# Patient Record
Sex: FEMALE | Race: OTHER | Employment: UNEMPLOYED | URBAN - METROPOLITAN AREA
[De-identification: names, ages, dates, MRNs, and addresses within clinical notes are randomized per-mention and may not be internally consistent; named-entity substitution may affect disease eponyms.]

---

## 2017-01-06 ENCOUNTER — TELEPHONE (OUTPATIENT)
Dept: ENDOCRINOLOGY | Age: 37
End: 2017-01-06

## 2017-01-06 NOTE — TELEPHONE ENCOUNTER
----- Message from Rukhsana Marrero MD sent at 1/6/2017 12:11 PM EST -----  Regarding: RE: sample  You can give them a pen or two.  ----- Message -----     From: Svitlana Mohr     Sent: 1/6/2017  11:23 AM       To: Rukhsana Marrero MD  Subject: sample                                            is calling to say they are having a hard time getting the Victoza. They are wondering if we can give them some samples till they get theirs.     ------------  Per Dr Alondra Mendez, I called Jacob Haas back and told her I had some samples here for her to . I spoke with her  Lou Chairez. He said he would come in on Monday 1-9-17 to pick them up.

## 2017-02-09 ENCOUNTER — HOSPITAL ENCOUNTER (EMERGENCY)
Age: 37
Discharge: HOME | End: 2017-02-09
Attending: EMERGENCY MEDICINE
Payer: MEDICAID

## 2017-02-09 ENCOUNTER — APPOINTMENT (OUTPATIENT)
Dept: GENERAL RADIOLOGY | Age: 37
End: 2017-02-09
Attending: EMERGENCY MEDICINE
Payer: MEDICAID

## 2017-02-09 VITALS
HEIGHT: 61 IN | TEMPERATURE: 97.9 F | OXYGEN SATURATION: 99 % | RESPIRATION RATE: 16 BRPM | DIASTOLIC BLOOD PRESSURE: 67 MMHG | SYSTOLIC BLOOD PRESSURE: 101 MMHG

## 2017-02-09 DIAGNOSIS — M79.672 LEFT FOOT PAIN: Primary | ICD-10-CM

## 2017-02-09 LAB
GLUCOSE BLD STRIP.AUTO-MCNC: 108 MG/DL (ref 65–100)
SERVICE CMNT-IMP: ABNORMAL

## 2017-02-09 PROCEDURE — 74011250637 HC RX REV CODE- 250/637: Performed by: EMERGENCY MEDICINE

## 2017-02-09 PROCEDURE — 73590 X-RAY EXAM OF LOWER LEG: CPT

## 2017-02-09 PROCEDURE — 73630 X-RAY EXAM OF FOOT: CPT

## 2017-02-09 PROCEDURE — 82962 GLUCOSE BLOOD TEST: CPT

## 2017-02-09 PROCEDURE — 99284 EMERGENCY DEPT VISIT MOD MDM: CPT

## 2017-02-09 RX ORDER — ACETAMINOPHEN 325 MG/1
975 TABLET ORAL
Status: COMPLETED | OUTPATIENT
Start: 2017-02-09 | End: 2017-02-09

## 2017-02-09 RX ADMIN — ACETAMINOPHEN 975 MG: 325 TABLET, FILM COATED ORAL at 08:36

## 2017-02-09 NOTE — ED PROVIDER NOTES
HPI Comments: This patient with a history of diabetes and high cholesterol presents with 10 days of left foot pain. It is worse with walking and she can hardly walk with this pain. She denies any fever or chills or recent illnesses. Pain has also progressed to the distal left leg just above the ankle as well. No numbness of the toes. No abdominal or chest pain. No calf pain or tenderness. No leg swelling. No history of DVT. No skin changes. There are no other issues today. Patient is a 31 y.o. female presenting with foot pain. Foot Pain           Past Medical History:   Diagnosis Date    Diabetes mellitus ([facility] [address]      insulin    Hyperlipidemia     Obesity        Past Surgical History:   Procedure Laterality Date    Hx  section  2011     breech    Pr  delivery only                Family History:   Problem Relation Age of Onset    Diabetes Mother     Diabetes Father     Diabetes Sister     Diabetes Brother        Social History     Social History    Marital status:      Spouse name: N/A    Number of children: N/A    Years of education: N/A     Occupational History    Not on file. Social History Main Topics    Smoking status: Never Smoker    Smokeless tobacco: Never Used    Alcohol use No    Drug use: No    Sexual activity: Yes     Partners: Male     Birth control/ protection: None     Other Topics Concern    Not on file     Social History Narrative         ALLERGIES: Review of patient's allergies indicates no known allergies. Review of Systems   All other systems reviewed and are negative. Vitals:    17 0803   BP: 101/67   Resp: 16   Temp: 97.9 °F (36.6 °C)   SpO2: 99%   Height: 5' 1\" (1.549 m)            Physical Exam   Constitutional: She appears well-developed and well-nourished. No distress. HENT:   Head: Normocephalic. Nose: Nose normal.   Mouth/Throat: Oropharynx is clear and moist. No oropharyngeal exudate.    Eyes: Conjunctivae are normal. Pupils are equal, round, and reactive to light. Neck: No tracheal deviation present. Cardiovascular: Normal rate, regular rhythm, normal heart sounds and intact distal pulses. Pulmonary/Chest: Effort normal and breath sounds normal.   Abdominal: Soft. She exhibits no distension. There is no tenderness. There is no rebound. Musculoskeletal:   Tender to distal leg, left ankle, dorsum of left foot. No warmth, skin change, edema noted. Distal nv exam grossly intact. Neurological: She is alert. Skin: Skin is warm and dry. She is not diaphoretic. Psychiatric: She has a normal mood and affect. [facility]  ED Course       Procedures           Reviewed xrays    Labs Reviewed   GLUCOSE, POC - Abnormal; Notable for the following:        Result Value    Glucose (POC) 108 (*)     All other components within normal limits   POC GLUCOSE     Has no PCP  Will refer  Doubt infection  Sent out on crutches.

## 2017-02-09 NOTE — DISCHARGE INSTRUCTIONS
We hope that we have addressed all of your medical concerns. The examination and treatment you received in the Emergency Department were for an emergent problem and were not intended as complete care. It is important that you follow up with your healthcare provider(s) for ongoing care. If your symptoms worsen or do not improve as expected, and you are unable to reach your usual health care provider(s), you should return to the Emergency Department. Today's healthcare is undergoing tremendous change, and patient satisfaction surveys are one of the many tools to assess the quality of medical care. You may receive a survey from the [employer] regarding your experience in the Emergency Department. I hope that your experience has been completely positive, particularly the medical care that I provided. As such, please participate in the survey; anything less than excellent does not meet my expectations or intentions. [facility] and [employer] participate in nationally recognized quality of care measures. If your blood pressure is greater than 120/80, as reported below, we urge that you seek medical care to address the potential of high blood pressure, commonly known as hypertension. Hypertension can be hereditary or can be caused by certain medical conditions, pain, stress, or \"white coat syndrome. \"       Please make an appointment with your health care provider(s) for follow up of your Emergency Department visit. VITALS:   Patient Vitals for the past 8 hrs:   Temp Resp BP SpO2   02/09/17 0803 97.9 °F (36.6 °C) 16 101/67 99 %          Thank you for allowing us to provide you with medical care today. We realize that you have many choices for your emergency care needs. Please choose us in the future for any continued health care needs.       Regards,           Jorge Lester, DO    [employer]   Office: [phone]            Recent Results (from the past 24 hour(s))   GLUCOSE, POC    Collection Time: 02/09/17  8:38 AM   Result Value Ref Range    Glucose (POC) 108 (H) 65 - 100 mg/dL    Performed by Keyanna Trejo        Xr Tib/fib Lt    Result Date: 2/9/2017  EXAM:  XR TIB/FIB LT INDICATION:  pain distal leg for 10 days. COMPARISON: None. FINDINGS: AP and lateral  views of the left tibia and fibula demonstrate no fracture or other acute osseous, articular or soft tissue abnormality. IMPRESSION: No acute abnormality. Xr Foot Lt Min 3 V    Result Date: 2/9/2017  EXAM:  XR FOOT LT MIN 3 V INDICATION:   pain dorsum left foot x 10 days. . COMPARISON:  None. FINDINGS:  Three views of the left foot demonstrate no fracture or other acute osseous or articular abnormality. The soft tissues are within normal limits. IMPRESSION:  No acute abnormality. Foot Pain: Care Instructions  Your Care Instructions  Foot injuries that cause pain and swelling are fairly common. Almost all sports or home repair projects can cause a misstep that ends up as foot pain. Normal wear and tear, especially as you get older, also can cause foot pain. Most minor foot injuries will heal on their own, and home treatment is usually all you need to do. If you have a severe injury, you may need tests and treatment. Follow-up care is a key part of your treatment and safety. Be sure to make and go to all appointments, and call your doctor if you are having problems. Its also a good idea to know your test results and keep a list of the medicines you take. How can you care for yourself at home? · Take pain medicines exactly as directed. ¨ If the doctor gave you a prescription medicine for pain, take it as prescribed. ¨ If you are not taking a prescription pain medicine, ask your doctor if you can take an over-the-counter medicine. · Rest and protect your foot. Take a break from any activity that may cause pain.   · Put ice or a cold pack on your foot for 10 to 20 minutes at a time. Put a thin cloth between the ice and your skin. · Prop up the sore foot on a pillow when you ice it or anytime you sit or lie down during the next 3 days. Try to keep it above the level of your heart. This will help reduce swelling. · Your doctor may recommend that you wrap your foot with an elastic bandage. Keep your foot wrapped for as long as your doctor advises. · If your doctor recommends crutches, use them as directed. · Wear roomy footwear. · As soon as pain and swelling end, begin gentle exercises of your foot. Your doctor can tell you which exercises will help. When should you call for help? Call 911 anytime you think you may need emergency care. For example, call if:  · Your foot turns pale, white, blue, or cold. Call your doctor now or seek immediate medical care if:  · You cannot move or stand on your foot. · Your foot looks twisted or out of its normal position. · Your foot is not stable when you step down. · You have signs of infection, such as:  ¨ Increased pain, swelling, warmth, or redness. ¨ Red streaks leading from the sore area. ¨ Pus draining from a place on your foot. ¨ A fever. · Your foot is numb or tingly. Watch closely for changes in your health, and be sure to contact your doctor if:  · You do not get better as expected. · You have bruises from an injury that last longer than 2 weeks. Where can you learn more? Go to http://tayler-eduardo.info/. Enter I034 in the search box to learn more about \"Foot Pain: Care Instructions. \"  Current as of: May 23, 2016  Content Version: 11.1  © 0640-9975 [employer]. Care instructions adapted under license by [employer] (which disclaims liability or warranty for this information).  If you have questions about a medical condition or this instruction, always ask your healthcare professional. Nancy Ansari any warranty or liability for your use of this information. Learning About RICE (Rest, Ice, Compression, and Elevation)  What is RICE? RICE is a way to care for an injury. RICE helps relieve pain and swelling. It may also help with healing and flexibility. RICE stands for:  · Rest and protect the injured or sore area. · Ice or a cold pack used as soon as possible. · Compression, or wrapping the injured or sore area with an elastic bandage. · Elevation (propping up) the injured or sore area. How do you do RICE? You can use RICE for home treatment when you have general aches and pains or after an injury or surgery. Rest  · Do not put weight on the injury for at least 24 to 48 hours. · Use crutches for a badly sprained knee or ankle. · Support a sprained wrist, elbow, or shoulder with a sling. Ice  · Put ice or a cold pack on the injury right away to reduce pain and swelling. Frozen vegetables will also work as an ice pack. Put a thin cloth between the ice or cold pack and your skin. The cloth protects the injured area from getting too cold. · Use ice for 10 to 15 minutes at a time for the first 48 to 72 hours. Compression  · Use compression for sprains, strains, and surgeries of the arms and legs. · Wrap the injured area with an elastic bandage or compression sleeve to reduce swelling. · Don't wrap it too tightly. If the area below it feels numb, tingles, or feels cool, loosen the wrap. Elevation  · Use elevation for areas of the body that can be propped up, such as arms and legs. · Prop up the injured area on pillows whenever you use ice. Keep it propped up anytime you sit or lie down. · Try to keep the injured area at or above the level of your heart. This will help reduce swelling and bruising. Where can you learn more? Go to http://tayler-eduardo.info/. Enter X210 in the search box to learn more about \"Learning About RICE (Rest, Ice, Compression, and Elevation). \"  Current as of: May 23, 2016  Content Version: 11.1  © 9525-1273 [employer]. Care instructions adapted under license by [employer] (which disclaims liability or warranty for this information). If you have questions about a medical condition or this instruction, always ask your healthcare professional. [address] any warranty or liability for your use of this information.

## 2017-02-13 ENCOUNTER — HOSPITAL ENCOUNTER (OUTPATIENT)
Dept: LAB | Age: 37
Discharge: HOME | End: 2017-02-13

## 2017-02-13 PROCEDURE — 87624 HPV HI-RISK TYP POOLED RSLT: CPT | Performed by: OBSTETRICS & GYNECOLOGY

## 2017-02-13 PROCEDURE — 88175 CYTOPATH C/V AUTO FLUID REDO: CPT | Performed by: OBSTETRICS & GYNECOLOGY

## 2017-02-14 ENCOUNTER — HOSPITAL ENCOUNTER (OUTPATIENT)
Dept: LAB | Age: 37
Discharge: HOME | End: 2017-02-14

## 2017-02-14 LAB — HCG SERPL-ACNC: 281 MIU/ML (ref 0–6)

## 2017-02-14 PROCEDURE — 84702 CHORIONIC GONADOTROPIN TEST: CPT | Performed by: NURSE PRACTITIONER

## 2017-02-16 ENCOUNTER — TELEPHONE (OUTPATIENT)
Dept: ENDOCRINOLOGY | Age: 37
End: 2017-02-16

## 2017-02-16 NOTE — TELEPHONE ENCOUNTER
Angelica stopped her meds because she is pregnant and you had told her to stop when she became pregnant. Is the 28th appointment still ok. Brenda Sharp     ---------------------    2/16/2017 413pm    I called Angelica back and spoke with her  and relayed the message from Dr. Darien Higuera that she should continue with her levemir and we should see her tomorrow at 9:50am. I gave them to the  to make that appointment.   Brenda Sharp

## 2017-02-16 NOTE — TELEPHONE ENCOUNTER
Patient stopped taking all of her medication on 02/12/16, and has an appt for 02/28/17 wants to know if the provider wants to see the patient prior to the already scheduled appt. ?

## 2017-02-17 ENCOUNTER — OFFICE VISIT (OUTPATIENT)
Dept: ENDOCRINOLOGY | Age: 37
End: 2017-02-17

## 2017-02-17 VITALS
WEIGHT: 159.2 LBS | BODY MASS INDEX: 30.06 KG/M2 | HEIGHT: 61 IN | SYSTOLIC BLOOD PRESSURE: 117 MMHG | DIASTOLIC BLOOD PRESSURE: 68 MMHG | HEART RATE: 98 BPM

## 2017-02-17 DIAGNOSIS — O24.911 DIABETES MELLITUS COMPLICATING PREGNANCY, ANTEPARTUM, FIRST TRIMESTER: Primary | ICD-10-CM

## 2017-02-17 DIAGNOSIS — E78.2 MIXED HYPERLIPIDEMIA: ICD-10-CM

## 2017-02-17 DIAGNOSIS — Z79.4 UNCONTROLLED TYPE 2 DIABETES MELLITUS WITH HYPERGLYCEMIA, WITH LONG-TERM CURRENT USE OF INSULIN (HCC): ICD-10-CM

## 2017-02-17 DIAGNOSIS — E11.65 UNCONTROLLED TYPE 2 DIABETES MELLITUS WITH HYPERGLYCEMIA, WITH LONG-TERM CURRENT USE OF INSULIN (HCC): ICD-10-CM

## 2017-02-17 DIAGNOSIS — E78.5 HYPERLIPIDEMIA, UNSPECIFIED HYPERLIPIDEMIA TYPE: ICD-10-CM

## 2017-02-17 DIAGNOSIS — I10 ESSENTIAL HYPERTENSION WITH GOAL BLOOD PRESSURE LESS THAN 130/80: ICD-10-CM

## 2017-02-17 RX ORDER — INSULIN LISPRO 100 [IU]/ML
INJECTION, SOLUTION INTRAVENOUS; SUBCUTANEOUS
Qty: 7 PACKAGE | Refills: 3 | Status: SHIPPED | OUTPATIENT
Start: 2017-02-17 | End: 2017-02-28 | Stop reason: SDUPTHER

## 2017-02-17 NOTE — PATIENT INSTRUCTIONS
Levemir 50 units at bedtime  Humalog 20 units each meal,        Increase humalog by 2 units every 2 days to reach goal blood sugar        Goal blood sugars:  Before meals <120  Fasting AM    <90    Stop all other diabetes, cholesterol, and blood pressure medications  Take only the above,    ----------------------------------------------------------------------------------------------------------------------    Below you will find a glucose log sheet which you can use to record your blood sugars. Without checking and recording what your home glucose levels are, it will be difficult to make any changes to your medication dose, even when significant changes may be needed. Please feel free to use the log below to record your home glucose levels. At the very least, I would like for you to login the entire 2-3 weeks just before your visit so we can make your visit much more productive and beneficial to you. GLUCOSE LOG SHEET:    Date Breakfast Lunch Dinner Bedtime Comments ? GLUCOSE LOG SHEET:    Date Breakfast Lunch Dinner Bedtime Comments ? GLUCOSE LOG SHEET:    Date Breakfast Lunch Dinner Bedtime Comments ?

## 2017-02-17 NOTE — MR AVS SNAPSHOT
Visit Information Date & Time Provider Department Dept. Phone Encounter #  
 2/17/2017  9:50 AM Bobby Zee, [address] [place] [address] Diabetes and Endocrinology [id number]  Follow-up Instructions Return in about 2 weeks (around 3/3/2017). Your Appointments 2/28/2017 12:10 PM  
Follow Up with MD Dougie Pizarro Diabetes and Endocrinology [facility] Appt Note: 2 month f/u    Diabetes [address] [id number] [address] [place] [address] Upcoming Health Maintenance Date Due  
 EYE EXAM RETINAL OR DILATED Q1 12/6/1992 Pneumococcal 19-64 Medium Risk (1 of 1 - PPSV23) 12/6/2001 DTaP/Tdap/Td series (1 - Tdap) 12/6/2003 INFLUENZA AGE 9 TO ADULT 8/1/2016 PAP AKA CERVICAL CYTOLOGY 3/11/2017 HEMOGLOBIN A1C Q6M 5/16/2017 FOOT EXAM Q1 9/13/2017 MICROALBUMIN Q1 11/16/2017 LIPID PANEL Q1 11/16/2017 Allergies as of 2/17/2017  Review Complete On: 2/17/2017 By: Bobby Zee MD  
 No Known Allergies Current Immunizations  Reviewed on 9/5/2014 Name Date Influenza High Dose Vaccine PF 10/1/2013 Not reviewed this visit You Were Diagnosed With   
  
 Codes Comments Diabetes mellitus complicating pregnancy, antepartum, first trimester    -  Primary ICD-10-CM: O24.911 ICD-9-CM: 648.03, 250.00 Essential hypertension with goal blood pressure less than 130/80     ICD-10-CM: I10 
ICD-9-CM: 401.9 Hyperlipidemia, unspecified hyperlipidemia type     ICD-10-CM: E78.5 ICD-9-CM: 272.4 Uncontrolled type 2 diabetes mellitus with hyperglycemia, with long-term current use of insulin (HCC)     ICD-10-CM: E11.65, Z79.4 ICD-9-CM: 250.02, V58.67 Mixed hyperlipidemia     ICD-10-CM: E78.2 ICD-9-CM: 272.2 Vitals BP Pulse Height(growth percentile) Weight(growth percentile) LMP BMI 117/68 (BP 1 Location: Left arm, BP Patient Position: Sitting) 98 5' 1\" (1.549 m) 159 lb 3.2 oz (72.2 kg) 01/10/2017 30.08 kg/m2 OB Status Smoking Status Having regular periods Never Smoker Vitals History BMI and BSA Data Body Mass Index Body Surface Area 30.08 kg/m 2 1.76 m 2 Preferred Pharmacy Pharmacy Name Phone [facility] [address] [place] [address] Your Updated Medication List  
  
   
This list is accurate as of: 2/17/17 10:07 AM.  Always use your most recent med list.  
  
  
  
  
 canagliflozin 300 mg tablet Commonly known as:  Luevenia Nails TAKE ONE TABLET BY MOUTH ONCE DAILY IN THE MORNING  
  
 cyclobenzaprine 5 mg tablet Commonly known as:  FLEXERIL Take 1 Tab by mouth two (2) times a day. diclofenac EC 75 mg EC tablet Commonly known as:  VOLTAREN Take 1 Tab by mouth two (2) times a day. glipiZIDE 5 mg tablet Commonly known as:  GLUCOTROL  
5-10mg with each meal  
  
 glucose blood VI test strips strip Commonly known as:  Wes Romp Use to test blood glucose 5x daily * HYDROcodone-acetaminophen 5-325 mg per tablet Commonly known as:  Mike Songster Take 1 Tab by mouth every four (4) hours as needed for Pain. Max Daily Amount: 6 Tabs. * HYDROcodone-acetaminophen 7.5-325 mg per tablet Commonly known as:  NORCO  
TAKE 1 TABLET BY MOUTH EVERY 4 TO 6 HOURS AS NEEDED FOR PAIN  
  
 ibuprofen 600 mg tablet Commonly known as:  MOTRIN Take 1 Tab by mouth every eight (8) hours as needed for Pain. insulin detemir 100 unit/mL (3 mL) Inpn Commonly known as:  LEVEMIR FLEXTOUCH  
50 units at bedtime  
  
 insulin lispro 100 unit/mL kwikpen Commonly known as:  HUMALOG Up to 40 units with each meal  
  
 Liraglutide 0.6 mg/0.1 mL (18 mg/3 mL) sub-q pen Commonly known as:  VICTOZA 3-DEBRA  
0.3 mL by SubCUTAneous route daily. Titrate up to 1.8mg daily as direcected lisinopril 5 mg tablet Commonly known as:  Una Chester Take 1 Tab by mouth daily. metFORMIN 1,000 mg tablet Commonly known as:  GLUCOPHAGE Take 1 Tab by mouth two (2) times a day. oxyCODONE-acetaminophen 5-325 mg per tablet Commonly known as:  PERCOCET Take 1-2 tablets by mouth every four (4) hours as needed for Pain. penicillin v potassium 500 mg tablet Commonly known as:  VEETID  
TAKE 1 TABLET BY MOUTH 4 TIMES A DAY  
  
 PNV with Ca,No.71-Iron-FA 27-1 mg Tab Take  by mouth. simvastatin 20 mg tablet Commonly known as:  ZOCOR Take 1 Tab by mouth nightly. * Notice: This list has 2 medication(s) that are the same as other medications prescribed for you. Read the directions carefully, and ask your doctor or other care provider to review them with you. Prescriptions Printed Refills  
 insulin lispro (HUMALOG) 100 unit/mL kwikpen 3 Sig: Up to 40 units with each meal  
 Class: Print  
 insulin detemir (LEVEMIR FLEXTOUCH) 100 unit/mL (3 mL) inpn 5 Si units at bedtime Class: Print Follow-up Instructions Return in about 2 weeks (around 3/3/2017). Patient Instructions Levemir 50 units at bedtime Humalog 20 units each meal, Increase humalog by 2 units every 2 days to reach goal blood sugar Goal blood sugars: 
Before meals <120 Fasting AM    <90 Stop all other diabetes, cholesterol, and blood pressure medications Take only the above, 
 
---------------------------------------------------------------------------------------------------------------------- Below you will find a glucose log sheet which you can use to record your blood sugars. Without checking and recording what your home glucose levels are, it will be difficult to make any changes to your medication dose, even when significant changes may be needed.  Please feel free to use the log below to record your home glucose levels. At the very least, I would like for you to login the entire 2-3 weeks just before your visit so we can make your visit much more productive and beneficial to you. GLUCOSE LOG SHEET: 
 
Date Breakfast Lunch Dinner Bedtime Comments ? GLUCOSE LOG SHEET: 
 
Date Breakfast Lunch Dinner Bedtime Comments ? GLUCOSE LOG SHEET: 
 
Date Breakfast Lunch Dinner Bedtime Comments ? Introducing hospitals & HEALTH SERVICES! Tristin Eastman introduces I[employer] patient portal. Now you can access parts of your medical record, email your doctor's office, and request medication refills online. 1. In your internet browser, go to https://[employer]. [employer] 2. Click on the First Time User? Click Here link in the Sign In box. You will see the New Member Sign Up page. 3. Enter your [employer] Access Code exactly as it appears below. You will not need to use this code after youve completed the sign-up process. If you do not sign up before the expiration date, you must request a new code. · [employer] Access Code: [email or web address] Expires: 3/20/2017 12:36 PM 
 
4.  Enter the last four digits of your Social Security Number (xxxx) and Date of Birth (mm/dd/yyyy) as indicated and click Submit. You will be taken to the next sign-up page. 5. Create a [employer] ID. This will be your [employer] login ID and cannot be changed, so think of one that is secure and easy to remember. 6. Create a [employer] password. You can change your password at any time. 7. Enter your Password Reset Question and Answer. This can be used at a later time if you forget your password. 8. Enter your e-mail address. You will receive e-mail notification when new information is available in [address] 9. Click Sign Up. You can now view and download portions of your medical record. 10. Click the Download Summary menu link to download a portable copy of your medical information. If you have questions, please visit the Frequently Asked Questions section of the [employer] website. Remember, [employer] is NOT to be used for urgent needs. For medical emergencies, dial 911. Now available from your iPhone and Android! Please provide this summary of care documentation to your next provider. Your primary care clinician is listed as NONE. If you have any questions after today's visit, please call [phone].

## 2017-02-17 NOTE — PROGRESS NOTES
CHIEF COMPLAINT: f/u uncontrolled type 2 diabetes    HISTORY OF PRESENT ILLNESS:   Daniel Sims is a 31 y.o. female with a PMHx as noted below who presents for f/u of uncontrolled type 2 diabetes. Patient notes that she is now 3-4 weeks pregnant. She had called a few days ago to let us know, and she had stopped all her medications except her levemir as I had instructed her to do in case she ever became pregnant. She increased her levemir to 50 units BID. She is seeing an OB and a high risk OB as well. She is requesting a letter to advise that she needs her mother in law to travel to the [address] [place] [address] [address] to help with her children during this time. She has previously been taking victoza, invokana, metformin, and levemir. Current medications:  Invokana 300mg daily (HELD FOR PREGNANCY)  Metformin 1000mg BID (HELD FOR PREGNANCY)  Levemir 50 U once BID  Victoza (HELD FOR PREGNANCY)  Simvastatin 20mg daily (HELD FOR PREGNANCY)  Lisinopril 5mg daily (HELD FOR PREGNANCY)    Review of home glucose:  No log for review today    Vitamin D deficiency. 22 OH D level of 13.9, prev advised 2000 units of D3 daily        PAST MEDICAL/SURGICAL HISTORY:   Past Medical History   Diagnosis Date    Diabetes mellitus ([address]      insulin    Hyperlipidemia     Obesity      Past Surgical History   Procedure Laterality Date    Hx  section  2011     breech    Pr  delivery only              ALLERGIES:   No Known Allergies    MEDICATIONS ON ADMISSION:     Current Outpatient Prescriptions:     insulin lispro (HUMALOG) 100 unit/mL kwikpen, Up to 40 units with each meal, Disp: 7 Package, Rfl: 3    insulin detemir (LEVEMIR FLEXTOUCH) 100 unit/mL (3 mL) inpn, 50 units at bedtime, Disp: 15 Pen, Rfl: 5    glucose blood VI test strips (ONE TOUCH VERIO) strip, Use to test blood glucose 5x daily, Disp: 100 Strip, Rfl: 11    Liraglutide (VICTOZA 3-DEBRA) 0.6 mg/0.1 mL (18 mg/3 mL) sub-q pen, 0.3 mL by SubCUTAneous route daily.  Titrate up to 1.8mg daily as direcected, Disp: 3 Each, Rfl: 0    simvastatin (ZOCOR) 20 mg tablet, Take 1 Tab by mouth nightly., Disp: 90 Tab, Rfl: 3    metFORMIN (GLUCOPHAGE) 1,000 mg tablet, Take 1 Tab by mouth two (2) times a day., Disp: 180 Tab, Rfl: 3    lisinopril (PRINIVIL, ZESTRIL) 5 mg tablet, Take 1 Tab by mouth daily. , Disp: 90 Tab, Rfl: 3    glipiZIDE (GLUCOTROL) 5 mg tablet, 5-10mg with each meal, Disp: 270 Tab, Rfl: 3    canagliflozin (INVOKANA) 300 mg tablet, TAKE ONE TABLET BY MOUTH ONCE DAILY IN THE MORNING, Disp: 90 Tab, Rfl: 3    HYDROcodone-acetaminophen (NORCO) 7.5-325 mg per tablet, TAKE 1 TABLET BY MOUTH EVERY 4 TO 6 HOURS AS NEEDED FOR PAIN, Disp: , Rfl: 0    penicillin v potassium (VEETID) 500 mg tablet, TAKE 1 TABLET BY MOUTH 4 TIMES A DAY, Disp: , Rfl: 0    HYDROcodone-acetaminophen (NORCO) 5-325 mg per tablet, Take 1 Tab by mouth every four (4) hours as needed for Pain. Max Daily Amount: 6 Tabs., Disp: 20 Tab, Rfl: 0    ibuprofen (MOTRIN) 600 mg tablet, Take 1 Tab by mouth every eight (8) hours as needed for Pain., Disp: 30 Tab, Rfl: 0    diclofenac EC (VOLTAREN) 75 mg EC tablet, Take 1 Tab by mouth two (2) times a day., Disp: 60 Tab, Rfl: 0    cyclobenzaprine (FLEXERIL) 5 mg tablet, Take 1 Tab by mouth two (2) times a day., Disp: 60 Tab, Rfl: 0    oxycodone-acetaminophen (PERCOCET) 5-325 mg per tablet, Take 1-2 tablets by mouth every four (4) hours as needed for Pain., Disp: 28 tablet, Rfl: 0    PNV with Ca,No.71-Iron-FA 27-1 mg tab, Take  by mouth., Disp: , Rfl:     SOCIAL HISTORY:   Social History     Social History    Marital status:      Spouse name: N/A    Number of children: N/A    Years of education: N/A     Occupational History    Not on file.      Social History Main Topics    Smoking status: Never Smoker    Smokeless tobacco: Never Used    Alcohol use No    Drug use: No    Sexual activity: Yes     Partners: Male     Birth control/ protection: None     Other Topics Concern    Not on file     Social History Narrative       FAMILY HISTORY:  Family History   Problem Relation Age of Onset    Diabetes Mother     Diabetes Father     Diabetes Sister     Diabetes Brother        REVIEW OF SYSTEMS: Complete ROS assessed and noted for that which is described above, all else are negative. Eyes: blurry vision  ENT: normal  CVS: normal  Resp: normal  GI: normal  : normal  GYN: normal  Endocrine: normal  Integument: normal  Musculoskeletal: normal  Neuro: parasthesias of feet  Psych: normal      PHYSICAL EXAMINATION:    VITAL SIGNS:  Visit Vitals    /68 (BP 1 Location: Left arm, BP Patient Position: Sitting)    Pulse 98    Ht 5' 1\" (1.549 m)    Wt 159 lb 3.2 oz (72.2 kg)    LMP 01/10/2017    BMI 30.08 kg/m2       GENERAL: NCAT, Sitting comfortably, NAD  EYES: EOMI, non-icteric, no proptosis  Ear/Nose/Throat: NCAT, no inflammation, no masses  LYMPH NODES: No LAD  CARDIOVASCULAR: S1 S2, RRR, No murmur  RESPIRATORY: CTA b/l, no wheeze/rales  GASTROINTESTINAL:  ND  MUSCULOSKELETAL: Normal ROM, no atrophy  SKIN: warm, no edema/rash/ or other skin changes  NEUROLOGIC: 5/5 power all extremities, no tremor, AAOx3  PSYCHIATRIC: Normal affect, Normal insight and judgement      REVIEW OF LABORATORY AND RADIOLOGY DATA:   Labs and documentation have been reviewed as described above. ASSESSMENT AND PLAN:   Fermin Zelaya is a 31 y.o. female with a PMHx as noted above who presents for f/u of uncontrolled type 2 diabetes.      Type 2 diabetes Uncontrolled in Pregnancy * New  Hyperlipidemia  Hypertension  Vitamin D deficiency    We spent time discussing the importance of medication adherence and regular checking of blood sugar for aggressive control of her diabetes during pregnancy, especially during the early stages of organogenesis, etc. We discussed the need for basal and bolus insulin, which she is not excited about but I have informed her that there is little flexibility now that she is pregnant, and we need to make our best effort. Discussed blood sugar goals. Plan: Type 2 Diabetes  Medications:  Decrease Levemir to 50units at bedtime only  Start humalog 20 units with each meal, increase by 2 units every 2 days to reach blood sugar targets  Off invokana, metformin, and victoza while pregnant  Check glucose ACHS  Goal Fasting blood sugar <90  Goal premeal blood sugar <120    HTN: BP stable, HOLD lisinopril 5mg FOR PREGNANCY  HLD:  HOLD Simvastatin 20mg daily FOR PREGNANCY  Vitamin D Deficiency: Continue 2000 units of D3 daily, should be on prenatal vitamins    RTC: 2 week initial f/u, then monthly thereafter until blood sugars are near goal.    [facility] [place].  [address] [facility]

## 2017-02-17 NOTE — LETTER
2017 10:03 AM 
 
Ms. Madrigal [address] [place] [id number] Please note, 
 
Ms. Helms,  84, is a patient of mine who has a very complicated diabetes. We are working together to get her diabetes under better control. She also has several children, one who has autism and requires very special care. Ms. Addie Peters is currently pregnant and she is unable to provide good care for her children, and still take care of her diabetes while she is pregnant. I feel it is very important that she is able to have someone to help her at home during this time, otherwise she is at risk for developing complications with her pregnancy and her health in general. 
 
I strongly recommend that she should have her relatives participate in her care, including her mother in law, even if it requires traveling to the [facility] to help her during this time. Sincerely, Natividad Mckenzie MD

## 2017-02-28 ENCOUNTER — OFFICE VISIT (OUTPATIENT)
Dept: ENDOCRINOLOGY | Age: 37
End: 2017-02-28

## 2017-02-28 VITALS
HEIGHT: 61 IN | BODY MASS INDEX: 31.21 KG/M2 | HEART RATE: 101 BPM | SYSTOLIC BLOOD PRESSURE: 98 MMHG | DIASTOLIC BLOOD PRESSURE: 58 MMHG | WEIGHT: 165.3 LBS

## 2017-02-28 DIAGNOSIS — E78.5 HYPERLIPIDEMIA, UNSPECIFIED HYPERLIPIDEMIA TYPE: ICD-10-CM

## 2017-02-28 DIAGNOSIS — I10 ESSENTIAL HYPERTENSION WITH GOAL BLOOD PRESSURE LESS THAN 130/80: ICD-10-CM

## 2017-02-28 DIAGNOSIS — O24.911 DIABETES MELLITUS COMPLICATING PREGNANCY, ANTEPARTUM, FIRST TRIMESTER: Primary | ICD-10-CM

## 2017-02-28 LAB — HBA1C MFR BLD HPLC: 7.9 %

## 2017-02-28 RX ORDER — INSULIN LISPRO 100 [IU]/ML
INJECTION, SOLUTION INTRAVENOUS; SUBCUTANEOUS
Qty: 8 PACKAGE | Refills: 3 | Status: SHIPPED | OUTPATIENT
Start: 2017-02-28 | End: 2017-06-20 | Stop reason: SDUPTHER

## 2017-02-28 NOTE — PROGRESS NOTES
CHIEF COMPLAINT: f/u uncontrolled type 2 diabetes    HISTORY OF PRESENT ILLNESS:   Carlyn Beckett is a 31 y.o. female with a PMHx as noted below who presents for f/u of uncontrolled type 2 diabetes. Patient notes she is now 6 weeks pregnant, has not yet established care with OBGYN. Discussed the importance of this. She is taking her insulin as directed and logging her blood sugar. Current medications:  Invokana 300mg daily (HELD FOR PREGNANCY)  Metformin 1000mg BID (HELD FOR PREGNANCY)  Levemir 50 U once at bedtime  Humalog, now up to 35 units with each meal  Victoza ([facility])  Simvastatin 20mg daily (HELD FOR PREGNANCY)  Lisinopril 5mg daily (HELD FOR PREGNANCY)    Review of home glucose:  AM: 180-250  Lunch: 200-260  Dinner: 170-300    Still taking the vitamin D3        PAST MEDICAL/SURGICAL HISTORY:   Past Medical History:   Diagnosis Date    Diabetes mellitus ([facility] [address]     insulin    Hyperlipidemia     Obesity      Past Surgical History:   Procedure Laterality Date     DELIVERY ONLY          HX  SECTION      breech       ALLERGIES:   No Known Allergies    MEDICATIONS ON ADMISSION:     Current Outpatient Prescriptions:     insulin lispro (HUMALOG) 100 unit/mL kwikpen, Up to 40 units with each meal, Disp: 7 Package, Rfl: 3    insulin detemir (LEVEMIR FLEXTOUCH) 100 unit/mL (3 mL) inpn, 50 units at bedtime, Disp: 15 Pen, Rfl: 5    ibuprofen (MOTRIN) 600 mg tablet, Take 1 Tab by mouth every eight (8) hours as needed for Pain., Disp: 30 Tab, Rfl: 0    glucose blood VI test strips (ONE TOUCH VERIO) strip, Use to test blood glucose 5x daily, Disp: 100 Strip, Rfl: 11    Liraglutide (VICTOZA 3-DEBRA) 0.6 mg/0.1 mL (18 mg/3 mL) sub-q pen, 0.3 mL by SubCUTAneous route daily.  Titrate up to 1.8mg daily as direcected, Disp: 3 Each, Rfl: 0    simvastatin (ZOCOR) 20 mg tablet, Take 1 Tab by mouth nightly., Disp: 90 Tab, Rfl: 3    metFORMIN (GLUCOPHAGE) 1,000 mg tablet, Take 1 Tab by mouth two (2) times a day., Disp: 180 Tab, Rfl: 3    lisinopril (PRINIVIL, ZESTRIL) 5 mg tablet, Take 1 Tab by mouth daily. , Disp: 90 Tab, Rfl: 3    glipiZIDE (GLUCOTROL) 5 mg tablet, 5-10mg with each meal, Disp: 270 Tab, Rfl: 3    canagliflozin (INVOKANA) 300 mg tablet, TAKE ONE TABLET BY MOUTH ONCE DAILY IN THE MORNING, Disp: 90 Tab, Rfl: 3    HYDROcodone-acetaminophen (NORCO) 7.5-325 mg per tablet, TAKE 1 TABLET BY MOUTH EVERY 4 TO 6 HOURS AS NEEDED FOR PAIN, Disp: , Rfl: 0    penicillin v potassium (VEETID) 500 mg tablet, TAKE 1 TABLET BY MOUTH 4 TIMES A DAY, Disp: , Rfl: 0    HYDROcodone-acetaminophen (NORCO) 5-325 mg per tablet, Take 1 Tab by mouth every four (4) hours as needed for Pain. Max Daily Amount: 6 Tabs., Disp: 20 Tab, Rfl: 0    diclofenac EC (VOLTAREN) 75 mg EC tablet, Take 1 Tab by mouth two (2) times a day., Disp: 60 Tab, Rfl: 0    cyclobenzaprine (FLEXERIL) 5 mg tablet, Take 1 Tab by mouth two (2) times a day., Disp: 60 Tab, Rfl: 0    oxycodone-acetaminophen (PERCOCET) 5-325 mg per tablet, Take 1-2 tablets by mouth every four (4) hours as needed for Pain., Disp: 28 tablet, Rfl: 0    PNV with Ca,No.71-Iron-FA 27-1 mg tab, Take  by mouth., Disp: , Rfl:     SOCIAL HISTORY:   Social History     Social History    Marital status:      Spouse name: N/A    Number of children: N/A    Years of education: N/A     Occupational History    Not on file.      Social History Main Topics    Smoking status: Never Smoker    Smokeless tobacco: Never Used    Alcohol use No    Drug use: No    Sexual activity: Yes     Partners: Male     Birth control/ protection: None     Other Topics Concern    Not on file     Social History Narrative       FAMILY HISTORY:  Family History   Problem Relation Age of Onset    Diabetes Mother     Diabetes Father     Diabetes Sister     Diabetes Brother        REVIEW OF SYSTEMS: Complete ROS assessed and noted for that which is described above, all else are negative. Eyes: blurry vision  ENT: normal  CVS: normal  Resp: normal  GI: normal  : normal  GYN: normal  Endocrine: normal  Integument: normal  Musculoskeletal: normal  Neuro: parasthesias of feet  Psych: normal      PHYSICAL EXAMINATION:    VITAL SIGNS:  Visit Vitals    BP 98/58 (BP 1 Location: Left arm, BP Patient Position: Sitting)    Pulse (!) 101    Ht 5' 1\" (1.549 m)    Wt 165 lb 4.8 oz (75 kg)    LMP 01/10/2017    BMI 31.23 kg/m2       GENERAL: NCAT, Sitting comfortably, NAD  EYES: EOMI, non-icteric, no proptosis  Ear/Nose/Throat: NCAT, no inflammation, no masses  LYMPH NODES: No LAD  CARDIOVASCULAR: S1 S2, RRR, No murmur  RESPIRATORY: CTA b/l, no wheeze/rales  GASTROINTESTINAL:  ND  MUSCULOSKELETAL: Normal ROM, no atrophy  SKIN: warm, no edema/rash/ or other skin changes  NEUROLOGIC: 5/5 power all extremities, no tremor, AAOx3  PSYCHIATRIC: Normal affect, Normal insight and judgement      REVIEW OF LABORATORY AND RADIOLOGY DATA:   Labs and documentation have been reviewed as described above. ASSESSMENT AND PLAN:   Anable Rodriguez is a 31 y.o. female with a PMHx as noted above who presents for f/u of uncontrolled type 2 diabetes. Type 2 diabetes Uncontrolled in Pregnancy  Hyperlipidemia  Hypertension  Vitamin D deficiency    Discussed the importance of establishing care with OBGYN. Having significant post prandial hyperglycemia and increasing doses of humalog. A1c today suggests 7.9% which means the victoza had resulted in improvement ( and having discontinued the soda ). Needs tighter control. Plan: Type 2 Diabetes  Medications:  Levemir 50units at bedtime only  Increase Humalog to 40 units, and again to 45 units in a few days to achieve goals.    Off invokana, metformin, and victoza while pregnant  Check glucose ACHS  Goal Fasting blood sugar <90  Goal premeal blood sugar <120    HTN: BP stable, HOLD lisinopril 5mg FOR PREGNANCY  HLD:  HOLD Simvastatin 20mg daily FOR PREGNANCY  Vitamin D Deficiency: Continue 2000 units of D3 daily, should be on prenatal vitamins    RTC: 2 week f/u visit    Carlos UPTON  [address] [facility]

## 2017-02-28 NOTE — MR AVS SNAPSHOT
Visit Information Date & Time Provider Department Dept. Phone Encounter #  
 2/28/2017 12:10 PM Scott Nelson [facility] Diabetes and Endocrinology [phone] Follow-up Instructions Return in about 2 weeks (around 3/14/2017). Your Appointments 3/7/2017  9:15 AM  
ULTRASOUND with AKROL Painting ([facility]) Appt Note: EOB/US + Dr. Anne Lau 1/5/POS UPT/?  
 [address] [facility] [address]  
[address]  
  
    
 3/7/2017  9:40 AM  
ESTABLISHED PATIENT with MD Mike Bales ([address] [place] [address]) Appt Note: EOB/US + Dr. Anne Lau 1/5/POS UPT/?  
 [address] [facility] [address] [address]  
[address] Upcoming Health Maintenance Date Due  
 EYE EXAM RETINAL OR DILATED Q1 12/6/1992 Pneumococcal 19-64 Medium Risk (1 of 1 - PPSV23) 12/6/2001 DTaP/Tdap/Td series (1 - Tdap) 12/6/2003 INFLUENZA AGE 9 TO ADULT 8/1/2016 HEMOGLOBIN A1C Q6M 5/16/2017 FOOT EXAM Q1 9/13/2017 MICROALBUMIN Q1 11/16/2017 LIPID PANEL Q1 11/16/2017 PAP AKA CERVICAL CYTOLOGY 2/13/2020 Allergies as of 2/28/2017  Review Complete On: 2/28/2017 By: Scott Nelson MD  
 No Known Allergies Current Immunizations  Reviewed on 9/5/2014 Name Date Influenza High Dose Vaccine PF 10/1/2013 Not reviewed this visit You Were Diagnosed With   
  
 Codes Comments Diabetes mellitus complicating pregnancy, antepartum, first trimester    -  Primary ICD-10-CM: O24.911 ICD-9-CM: 648.03, 250.00 Hyperlipidemia, unspecified hyperlipidemia type     ICD-10-CM: E78.5 ICD-9-CM: 272.4 Essential hypertension with goal blood pressure less than 130/80     ICD-10-CM: I10 
ICD-9-CM: 401.9 Vitals  BP  
  
  
  
  
  
 98/58 (BP 1 Location: Left arm, BP Patient Position: Sitting) Vitals History BMI and BSA Data Body Mass Index Body Surface Area  
 31.23 kg/m 2 1.8 m 2 Preferred Pharmacy Pharmacy Name [facility] [address] [place] [address] Your Updated Medication List  
  
   
This list is accurate as of: 2/28/17 12:54 PM.  Always use your most recent med list.  
  
  
  
  
 canagliflozin 300 mg tablet Commonly known as:  Maty Jesus TAKE ONE TABLET BY MOUTH ONCE DAILY IN THE MORNING  
  
 cyclobenzaprine 5 mg tablet Commonly known as:  FLEXERIL Take 1 Tab by mouth two (2) times a day. diclofenac EC 75 mg EC tablet Commonly known as:  VOLTAREN Take 1 Tab by mouth two (2) times a day. glipiZIDE 5 mg tablet Commonly known as:  GLUCOTROL  
5-10mg with each meal  
  
 glucose blood VI test strips strip Commonly known as:  Christine Boydton Use to test blood glucose 5x daily * HYDROcodone-acetaminophen 5-325 mg per tablet Commonly known as:  March Castles Take 1 Tab by mouth every four (4) hours as needed for Pain. Max Daily Amount: 6 Tabs. * HYDROcodone-acetaminophen 7.5-325 mg per tablet Commonly known as:  NORCO  
TAKE 1 TABLET BY MOUTH EVERY 4 TO 6 HOURS AS NEEDED FOR PAIN  
  
 ibuprofen 600 mg tablet Commonly known as:  MOTRIN Take 1 Tab by mouth every eight (8) hours as needed for Pain. insulin detemir 100 unit/mL (3 mL) Inpn Commonly known as:  LEVEMIR FLEXTOUCH  
50 units at bedtime  
  
 insulin lispro 100 unit/mL kwikpen Commonly known as:  HUMALOG Up to 50 units with each meal  
  
 Liraglutide 0.6 mg/0.1 mL (18 mg/3 mL) sub-q pen Commonly known as:  VICTOZA 3-DEBRA  
0.3 mL by SubCUTAneous route daily. Titrate up to 1.8mg daily as direcected  
  
 lisinopril 5 mg tablet Commonly known as:  Adilia Primmer Take 1 Tab by mouth daily. metFORMIN 1,000 mg tablet Commonly known as:  GLUCOPHAGE Take 1 Tab by mouth two (2) times a day. oxyCODONE-acetaminophen 5-325 mg per tablet Commonly known as:  PERCOCET Take 1-2 tablets by mouth every four (4) hours as needed for Pain. penicillin v potassium 500 mg tablet Commonly known as:  VEETID  
TAKE 1 TABLET BY MOUTH 4 TIMES A DAY  
  
 PNV with Ca,No.71-Iron-FA 27-1 mg Tab Take  by mouth. simvastatin 20 mg tablet Commonly known as:  ZOCOR Take 1 Tab by mouth nightly. * Notice: This list has 2 medication(s) that are the same as other medications prescribed for you. Read the directions carefully, and ask your doctor or other care provider to review them with you. Prescriptions Printed Refills  
 insulin lispro (HUMALOG) 100 unit/mL kwikpen 3 Sig: Up to 50 units with each meal  
 Class: Print Follow-up Instructions Return in about 2 weeks (around 3/14/2017). Patient Instructions Increase humalog to 40 units, and then again to 45 units after a few days, Levemir 50units Goal Fasting blood sugar <90 Goal premeal blood sugar <120 
 
 
---------------------------------------------------------------------------------------------------------------------- Below you will find a glucose log sheet which you can use to record your blood sugars. Without checking and recording what your home glucose levels are, it will be difficult to make any changes to your medication dose, even when significant changes may be needed. Please feel free to use the log below to record your home glucose levels. At the very least, I would like for you to login the entire 2-3 weeks just before your visit so we can make your visit much more productive and beneficial to you. GLUCOSE LOG SHEET: 
 
Date Breakfast Lunch Dinner Bedtime Comments ? GLUCOSE LOG SHEET: 
 
Date Breakfast Lunch Dinner Bedtime Comments ? GLUCOSE LOG SHEET: 
 
Date Breakfast Lunch Dinner Bedtime Comments ? Dr. Antonio Olvera Back to basics: 
 
When you have diabetes or pre-diabetes, as you know, your body has difficulty dealing with the sugar it absorbs from your meals. An unrelated but very relevant term you may have heard before, quantitative easing, has a new meaning: By gradually reducing the load of sugars entering the body, you ease the stress on the body and allow it to catch up with the work it must do. This in turn will allow your body to work better. On top of this, remember one point, the more sugar stress your body has, the more you enter a state of glucose toxicity and this is a state where you become even more resistant. Thats right higher sugar = more resistance, not only to your own bodies attempt to fix the problem but also resistance to your medications. This is why medications work best when a proper diet is followed. Tip 1. Dont forget the protein. When you add a portion of meat or other low carb protein food in your meal, it provides healthy calories which contribute to reducing that feeling of hunger that drives you to eat what you dont want. Tip 2. Portions are a real thing.  Before you eat, stop and look at your plate/table. Count how many items have sugars/carbs in them. A sandwich (bread) ? Junior Cap ? Sweet drink ? Potatoe ? Pasta ? Rice ? You would be surprised when you become aware. Aim for a reasonable portion of carbs, and if you feel you absolutely cannot do this, at least start working toward this. 45-60 grams is usually more than enough in one meal. And YES, than includes the desert! Tip 3. Three meals per day, snack-free in between! Your body needs a break. Eating an adequate meal keeps you from getting hungry and reaching for a snack in between meals. Recall that most of the time, diabetic patients are not treating these snacks. Dont eat dinner late at night, but rather allow more overnight fasting time for your body to recover. If you eat dinner at 8 PM, try 6 PM.  Sporadic eating is the opposite of what you need, and adjusting to a regular eating schedule such as this will not only be a great benefit to your body, but your medications will also tend to work better at keeping your diabetes under control. Tip 4. There is no best diet when it comes to weight loss. When comparing diets and outcomes, the main ingredient when searching for weight loss was calories ! Lower calories = better weight loss. Pick a healthy diet thats right for you, i.e. diabetes friendly, and evaluate your daily calorie intake. And of course this would be of no use without exercise. Calories in need calories out. Introducing [facility]! Curt Willson introduces [employer] patient portal. Now you can access parts of your medical record, email your doctor's office, and request medication refills online. 1. In your internet browser, go to [email or web address] 2. Click on the First Time User? Click Here link in the Sign In box. You will see the New Member Sign Up page. 3. Enter your [employer] Access Code exactly as it appears below.  You will not need to use this code after youve completed the sign-up process. If you do not sign up before the expiration date, you must request a new code. · [employer] Access Code: [email or web address] Expires: 3/20/2017 12:36 PM 
 
4. Enter the last four digits of your Social Security Number (xxxx) and Date of Birth (mm/dd/yyyy) as indicated and click Submit. You will be taken to the next sign-up page. 5. Create a [employer] ID. This will be your [employer] login ID and cannot be changed, so think of one that is secure and easy to remember. 6. Create a [employer] password. You can change your password at any time. 7. Enter your Password Reset Question and Answer. This can be used at a later time if you forget your password. 8. Enter your e-mail address. You will receive e-mail notification when new information is available in [address] 9. Click Sign Up. You can now view and download portions of your medical record. 10. Click the Download Summary menu link to download a portable copy of your medical information. If you have questions, please visit the Frequently Asked Questions section of the [employer] website. Remember, [employer] is NOT to be used for urgent needs. For medical emergencies, dial 911. Now available from your iPhone and Android! Please provide this summary of care documentation to your next provider. Your primary care clinician is listed as NONE. If you have any questions after today's visit, please call [phone].

## 2017-02-28 NOTE — PATIENT INSTRUCTIONS
Increase humalog to 40 units, and then again to 45 units after a few days,  Levemir 50units    Goal Fasting blood sugar <90  Goal premeal blood sugar <120      ----------------------------------------------------------------------------------------------------------------------    Below you will find a glucose log sheet which you can use to record your blood sugars. Without checking and recording what your home glucose levels are, it will be difficult to make any changes to your medication dose, even when significant changes may be needed. Please feel free to use the log below to record your home glucose levels. At the very least, I would like for you to login the entire 2-3 weeks just before your visit so we can make your visit much more productive and beneficial to you. GLUCOSE LOG SHEET:    Date Breakfast Lunch Dinner Bedtime Comments ? GLUCOSE LOG SHEET:    Date Breakfast Lunch Dinner Bedtime Comments ? GLUCOSE LOG SHEET:    Date Breakfast Lunch Dinner Bedtime Comments ? Dr. Michael Cerda to basics:    When you have diabetes or pre-diabetes, as you know, your body has difficulty dealing with the sugar it absorbs from your meals.  An unrelated but very relevant term you may have heard before, quantitative easing, has a new meaning: By gradually reducing the load of sugars entering the body, you ease the stress on the body and allow it to catch up with the work it must do. This in turn will allow your body to work better. On top of this, remember one point, the more sugar stress your body has, the more you enter a state of glucose toxicity and this is a state where you become even more resistant. Thats right higher sugar = more resistance, not only to your own bodies attempt to fix the problem but also resistance to your medications. This is why medications work best when a proper diet is followed. Tip 1. Dont forget the protein. When you add a portion of meat or other low carb protein food in your meal, it provides healthy calories which contribute to reducing that feeling of hunger that drives you to eat what you dont want. Tip 2. Portions are a real thing. Before you eat, stop and look at your plate/table. Count how many items have sugars/carbs in them. A sandwich (bread) ? Johana Hoist ? Sweet drink ? Potatoe ? Pasta ? Rice ? You would be surprised when you become aware. Aim for a reasonable portion of carbs, and if you feel you absolutely cannot do this, at least start working toward this. 45-60 grams is usually more than enough in one meal. And YES, than includes the desert! Tip 3. Three meals per day, snack-free in between! Your body needs a break. Eating an adequate meal keeps you from getting hungry and reaching for a snack in between meals. Recall that most of the time, diabetic patients are not treating these snacks. Dont eat dinner late at night, but rather allow more overnight fasting time for your body to recover.  If you eat dinner at 8 PM, try 6 PM.  Sporadic eating is the opposite of what you need, and adjusting to a regular eating schedule such as this will not only be a great benefit to your body, but your medications will also tend to work better at keeping your diabetes under control. Tip 4. There is no best diet when it comes to weight loss. When comparing diets and outcomes, the main ingredient when searching for weight loss was calories ! Lower calories = better weight loss. Pick a healthy diet thats right for you, i.e. diabetes friendly, and evaluate your daily calorie intake. And of course this would be of no use without exercise. Calories in need calories out.

## 2017-03-07 ENCOUNTER — OFFICE VISIT (OUTPATIENT)
Dept: OBGYN CLINIC | Age: 37
End: 2017-03-07

## 2017-03-07 VITALS
SYSTOLIC BLOOD PRESSURE: 98 MMHG | RESPIRATION RATE: 19 BRPM | HEART RATE: 91 BPM | HEIGHT: 61 IN | BODY MASS INDEX: 31.53 KG/M2 | WEIGHT: 167 LBS | DIASTOLIC BLOOD PRESSURE: 64 MMHG

## 2017-03-07 DIAGNOSIS — O24.911 DIABETES MELLITUS AFFECTING PREGNANCY IN FIRST TRIMESTER: ICD-10-CM

## 2017-03-07 DIAGNOSIS — Z34.81 PRENATAL CARE, SUBSEQUENT PREGNANCY, FIRST TRIMESTER: Primary | ICD-10-CM

## 2017-03-07 NOTE — PROGRESS NOTES
Current pregnancy history:    Elder Dominique is a 31 y.o. female who presents for the evaluation of pregnancy. Patient's last menstrual period was 01/10/2017 (exact date). LMP history:  The date of her LMP is fairly certain. Her last menstrual period was normal and lasted for 4 to 5 days. A urine pregnancy test was positive weeks ago. She was not on the pill at conception. Based on her LMP, her EDC is 10/17/2017 and her EGA is 8 weeks,0 days. Her menstrual cycles are regular and occur approximately every 28 days  and range from 3 to 5 days. The last menses lasted the usual number of days. Ultrasound data:  She had an  ultrasound done by the ultrasound tech today which revealed a viable victoria pregnancy with a gestational age of 9 weeks and 5 days giving an [address] of 10/19/2017. Pregnancy symptoms:    Since her LMP she has experienced  urinary frequency, breast tenderness, and nausea. She has not been vomiting over the last few weeks. Associated signs and symptoms which she denies: dysuria, discharge, vaginal bleeding. She states she has gained weight:  Approximately 5 pounds over the last few weeks. Relevant past pregnancy history:   She has the following pregnancy history: She has a history of  x2 , IDDM, uncontrolled, history of abnormal antibody   She has no history of  delivery. Relevant past medical history:(relevant to this pregnancy): noncontributory. Pap/Occupational history:  Last pap smear: 1 month ago Results: Normal      Her occupation is: homemaker. Substance history: negative for alcohol, tobacco and street drugs. Positive for nothing. Exposure history: There is/are no indoor cat/s in the home. The patient was instructed to not change the cat litter. She admits close contact with children on a regular basis. She has not had chicken pox or the vaccine in the past.   Patient denies issues with domestic violence.      Genetic Screening/Teratology Counseling: (Includes patient, baby's father, or anyone in either family with:)  3.  Patient's age >/= 28 at [facility]?-- no  .   2. [facility]semia ([facility] [place] [address], or  background): MCV<80?--no.     3.  Neural tube defect (meningomyelocele, spina bifida, anencephaly)?--no.   4.  Congenital heart defect?--no.  5.  Down syndrome?--no.   6.  Jose Armando-Sachs ([place], [place])?--no.   7.  Canavan's Disease?--no.   8.  Familial Dysautonomia?--no.   9.  Sickle cell disease or trait ()? --no   The patient has not been tested for sickle trait  10. Hemophilia or other blood disorders?--no. 11.  Muscular dystrophy?--no. 12.  Cystic fibrosis?--no. 13.  [place]'s Chorea?--no. 14.  Mental retardation/autism (if yes was person tested for Fragile X)?--no. 15.  Other inherited genetic or chromosomal disorder?--no. 12.  Maternal metabolic disorder (DM, PKU, etc)?--no. 17.  Patient or FOB with a child with a birth defect not listed above?--no.  17a. Patient or FOB with a birth defect themselves?--no. 18.  Recurrent pregnancy loss, or stillbirth?--no. 19.  Any medications since LMP other than prenatal vitamins (include vitamins,  supplements, OTC meds, drugs, alcohol)? --YES insulin  20. Any other genetic/environmental exposure to discuss?--no. Infection History:  1. Lives with someone with TB or TB exposed?--no.   2.  Patient or partner has history of genital herpes?--no.  3.  Rash or viral illness since LMP?--no.    4.  History of STD (GC, CT, HPV, syphilis, HIV)? --no   5. Other: OTHER? Past Medical History:   Diagnosis Date    Diabetes mellitus ([address]     insulin    Hyperlipidemia     Obesity      Past Surgical History:   Procedure Laterality Date     DELIVERY ONLY          HX  SECTION  2011    breech     Social History     Occupational History    Not on file.      Social History Main Topics    Smoking status: Never Smoker    Smokeless tobacco: Never Used    Alcohol use No    Drug use: No    Sexual activity: Yes     Partners: Male     Birth control/ protection: None     Family History   Problem Relation Age of Onset    Diabetes Mother     Diabetes Father     Diabetes Sister     Diabetes Brother        No Known Allergies  Prior to Admission medications    Medication Sig Start Date End Date Taking? Authorizing Provider   insulin lispro (HUMALOG) 100 unit/mL kwikpen Up to 50 units with each meal 2/28/17   Pauly Epstein MD   insulin detemir (LEVEMIR FLEXTOUCH) 100 unit/mL (3 mL) inpn 50 units at bedtime 2/17/17   Pauly Epstein MD   Liraglutide (VICTOZA 3-DEBRA) 0.6 mg/0.1 mL (18 mg/3 mL) sub-q pen 0.3 mL by SubCUTAneous route daily. Titrate up to 1.8mg daily as direcected 1/6/17   Ryder Fu MD   simvastatin (ZOCOR) 20 mg tablet Take 1 Tab by mouth nightly. 12/20/16   Pauly Epstein MD   metFORMIN (GLUCOPHAGE) 1,000 mg tablet Take 1 Tab by mouth two (2) times a day. 12/20/16   Pauly Epstein MD   lisinopril (PRINIVIL, ZESTRIL) 5 mg tablet Take 1 Tab by mouth daily. 12/20/16   Pauly Epstein MD   glipiZIDE (GLUCOTROL) 5 mg tablet 5-10mg with each meal 12/20/16   Pauly Epstein MD   canagliflozin [facility]) 300 mg tablet TAKE ONE TABLET BY MOUTH ONCE DAILY IN THE MORNING 12/20/16   Pauly Epstein MD   HYDROcodone-acetaminophen ([employer]) 7.5-325 mg per tablet TAKE 1 TABLET BY MOUTH EVERY 4 TO 6 HOURS AS NEEDED FOR PAIN 10/11/16   Historical Provider   penicillin v potassium (VEETID) 500 mg tablet TAKE 1 TABLET BY MOUTH 4 TIMES A DAY 10/11/16   Historical Provider   HYDROcodone-acetaminophen (NORCO) 5-325 mg per tablet Take 1 Tab by mouth every four (4) hours as needed for Pain. Max Daily Amount: 6 Tabs. 5/15/16   Piedad Fay MD   ibuprofen (MOTRIN) 600 mg tablet Take 1 Tab by mouth every eight (8) hours as needed for Pain.  5/15/16   Piedad Fay MD   diclofenac EC (VOLTAREN) 75 mg EC tablet Take 1 Tab by mouth two (2) times a day. 11/10/15   Alli Powers MD   cyclobenzaprine (FLEXERIL) 5 mg tablet Take 1 Tab by mouth two (2) times a day. 11/10/15   Alli Powers MD   oxycodone-acetaminophen (PERCOCET) 5-325 mg per tablet Take 1-2 tablets by mouth every four (4) hours as needed for Pain. 9/6/14   Anna Albert MD   glucose blood VI test strips (ONE TOUCH VERIO) strip Use to test blood glucose 5x daily 6/4/14   Pearline Severs, MD   PNV with Ca,No.71-Iron-FA 27-1 mg tab Take  by mouth.     Historical Provider        Review of Systems: History obtained from the patient  Constitutional: negative for weight loss, fever, night sweats  HEENT: negative for hearing loss, earache, congestion, snoring, sorethroat  CV: negative for chest pain, palpitations, edema  Resp: negative for cough, shortness of breath, wheezing  Breast: negative for breast lumps, nipple discharge, galactorrhea  GI: negative for change in bowel habits, abdominal pain, black or bloody stools  : negative for frequency, dysuria, hematuria, vaginal discharge  MSK: negative for back pain, joint pain, muscle pain  Skin: negative for itching, rash, hives  Neuro: negative for dizziness, headache, confusion, weakness  Psych: negative for anxiety, depression, change in mood  Heme/lymph: negative for bleeding, bruising, pallor    Objective:  Visit Vitals    BP 98/64 (BP 1 Location: Left arm, BP Patient Position: Sitting)    Pulse 91    Resp 19    Ht 5' 1\" (1.549 m)    Wt 167 lb (75.8 kg)    LMP 01/10/2017 (Exact Date)    BMI 31.55 kg/m2       Physical Exam:   PHYSICAL EXAMINATION    Constitutional  · Appearance: well-nourished, well developed, alert, in no acute distress    HENT  · Head  · Face: appears normal  · Eyes: appear normal  · Ears: normal  · Mouth: normal  · Lips: no lesions    Neck  · Inspection/Palpation: normal appearance, no masses or tenderness  · Lymph Nodes: no lymphadenopathy present  · Thyroid: gland size normal, nontender, no nodules or masses present on palpation    Chest  · Respiratory Effort: breathing unlabored  · Auscultation: normal breath sounds    Cardiovascular  · Heart:  · Auscultation: regular rate and rhythm without murmur    Breasts  · Inspection of Breasts: breasts symmetrical, no skin changes, no discharge present, nipple appearance normal, no skin retraction present  · Palpation of Breasts and Axillae: no masses present on palpation, no breast tenderness  · Axillary Lymph Nodes: no lymphadenopathy present    Gastrointestinal  · Abdominal Examination: abdomen non-tender to palpation, normal bowel sounds, no masses present  · Liver and spleen: no hepatomegaly present, spleen not palpable  · Hernias: no hernias identified    Genitourinary  · External Genitalia: normal appearance for age, no discharge present, no tenderness present, no inflammatory lesions present, no masses present, no atrophy present  · Vagina: normal vaginal vault without central or paravaginal defects, no discharge present, no inflammatory lesions present, no masses present  · Bladder: non-tender to palpation  · Urethra: appears normal  · Cervix: normal   · Uterus: enlarged, normal shape, soft  · Adnexa: no adnexal tenderness present, no adnexal masses present  · Perineum: perineum within normal limits, no evidence of trauma, no rashes or skin lesions present  · Anus: anus within normal limits, no hemorrhoids present  · Inguinal Lymph Nodes: no lymphadenopathy present    Skin  · General Inspection: no rash, no lesions identified    Neurologic/Psychiatric  · Mental Status:  · Orientation: grossly oriented to person, place and time  · Mood and Affect: mood normal, affect appropriate    Assessment:   Intrauterine pregnancy with the following problems identified: IDDM, h/o C/S x2, h/o abnormal antibody. Plan:     Offered CF testing, CVS, Nuchal Translucency, MSAFP, amnio, and discussed NIPT. She will see M to decide on testing.   Course of pregnancy discussed including visit schedule, routine U/S, glucola testing, etc.  Avoid alcoholic beverages and illicit/recreational drugs use  Take prenatal vitamins or folic acid daily. Hospital and practice style discussed with coverage system. Discussed nutrition, toxoplasmosis precautions, sexual activity, exercise, need for influenza vaccine, environmental and work hazards, travel advice, screen for domestic violence, need for seat belts. Discussed seafood, unpasteurized dairy products, deli meat, artificial sweeteners, and caffeine. Information on prenatal classes/breastfeeding given. Information on circumcision given  Patient encouraged not to smoke. Discussed current prescription drug use. Given medication list.  Discussed the use of over the counter medications and chemicals. Route of delivery discussed, including risks, benefits, and alternatives of  versus repeat LTCS. She will have repeat C/S. Pt understands risk of hemorrhage during pregnancy and post delivery and would accept blood products if necessary in life-threatening emergencies  See MFM for IDDM. Followed by endocrine. Handouts given to pt.

## 2017-03-09 LAB
ABO GROUP BLD: NORMAL
BACTERIA UR CULT: NORMAL
BASOPHILS # BLD AUTO: 0 X10E3/UL (ref 0–0.2)
BASOPHILS NFR BLD AUTO: 0 %
BLD GP AB SCN SERPL QL: NEGATIVE
C TRACH RRNA SPEC QL NAA+PROBE: NEGATIVE
EOSINOPHIL # BLD AUTO: 0.1 X10E3/UL (ref 0–0.4)
EOSINOPHIL NFR BLD AUTO: 1 %
ERYTHROCYTE [DISTWIDTH] IN BLOOD BY AUTOMATED COUNT: 15.1 % (ref 12.3–15.4)
EST. AVERAGE GLUCOSE BLD GHB EST-MCNC: 180 MG/DL
HBA1C MFR BLD: 7.9 % (ref 4.8–5.6)
HBV SURFACE AG SERPL QL IA: NEGATIVE
HCT VFR BLD AUTO: 40.9 % (ref 34–46.6)
HGB A MFR BLD: 98 % (ref 94–98)
HGB A2 MFR BLD COLUMN CHROM: 2 % (ref 0.7–3.1)
HGB BLD-MCNC: 13.4 G/DL (ref 11.1–15.9)
HGB C MFR BLD: 0 %
HGB F MFR BLD: 0 % (ref 0–2)
HGB FRACT BLD-IMP: NORMAL
HGB S BLD QL SOLY: NEGATIVE
HGB S MFR BLD: 0 %
HIV 1+2 AB+HIV1 P24 AG SERPL QL IA: NON REACTIVE
IMM GRANULOCYTES # BLD: 0 X10E3/UL (ref 0–0.1)
IMM GRANULOCYTES NFR BLD: 0 %
LYMPHOCYTES # BLD AUTO: 2.8 X10E3/UL (ref 0.7–3.1)
LYMPHOCYTES NFR BLD AUTO: 27 %
MCH RBC QN AUTO: 25.8 PG (ref 26.6–33)
MCHC RBC AUTO-ENTMCNC: 32.8 G/DL (ref 31.5–35.7)
MCV RBC AUTO: 79 FL (ref 79–97)
MONOCYTES # BLD AUTO: 0.5 X10E3/UL (ref 0.1–0.9)
MONOCYTES NFR BLD AUTO: 5 %
N GONORRHOEA RRNA SPEC QL NAA+PROBE: NEGATIVE
NEUTROPHILS # BLD AUTO: 6.8 X10E3/UL (ref 1.4–7)
NEUTROPHILS NFR BLD AUTO: 67 %
PLATELET # BLD AUTO: 243 X10E3/UL (ref 150–379)
RBC # BLD AUTO: 5.19 X10E6/UL (ref 3.77–5.28)
RH BLD: POSITIVE
RUBV IGG SERPL IA-ACNC: 25.2 INDEX
T PALLIDUM AB SER QL IA: NEGATIVE
WBC # BLD AUTO: 10.2 X10E3/UL (ref 3.4–10.8)

## 2017-03-16 ENCOUNTER — TELEPHONE (OUTPATIENT)
Dept: OBGYN CLINIC | Age: 37
End: 2017-03-16

## 2017-03-16 ENCOUNTER — OFFICE VISIT (OUTPATIENT)
Dept: ENDOCRINOLOGY | Age: 37
End: 2017-03-16

## 2017-03-16 VITALS
DIASTOLIC BLOOD PRESSURE: 57 MMHG | WEIGHT: 170.7 LBS | SYSTOLIC BLOOD PRESSURE: 105 MMHG | HEIGHT: 61 IN | BODY MASS INDEX: 32.23 KG/M2 | HEART RATE: 84 BPM

## 2017-03-16 DIAGNOSIS — O24.911 DIABETES MELLITUS COMPLICATING PREGNANCY, ANTEPARTUM, FIRST TRIMESTER: Primary | ICD-10-CM

## 2017-03-16 DIAGNOSIS — E78.5 HYPERLIPIDEMIA, UNSPECIFIED HYPERLIPIDEMIA TYPE: ICD-10-CM

## 2017-03-16 DIAGNOSIS — I10 ESSENTIAL HYPERTENSION WITH GOAL BLOOD PRESSURE LESS THAN 130/80: ICD-10-CM

## 2017-03-16 NOTE — PATIENT INSTRUCTIONS
Levemir 50units at bedtime only  Adjust Humalog to 50 / 45 / 60  Check glucose ACHS (Need to check bedtime blood sugars also)  Goal Fasting blood sugar <90  Goal premeal blood sugar <120      2 week follow up visit.  ----------------------------------------------------------------------------------------------------------------------    Below you will find a glucose log sheet which you can use to record your blood sugars. Without checking and recording what your home glucose levels are, it will be difficult to make any changes to your medication dose, even when significant changes may be needed. Please feel free to use the log below to record your home glucose levels. At the very least, I would like for you to login the entire 2-3 weeks just before your visit so we can make your visit much more productive and beneficial to you. GLUCOSE LOG SHEET:    Date Breakfast Lunch Dinner Bedtime Comments ? GLUCOSE LOG SHEET:    Date Breakfast Lunch Dinner Bedtime Comments ? GLUCOSE LOG SHEET:    Date Breakfast Lunch Dinner Bedtime Comments ?

## 2017-03-16 NOTE — PROGRESS NOTES
CHIEF COMPLAINT: f/u uncontrolled type 2 diabetes    HISTORY OF PRESENT ILLNESS:   Danelle Roper is a 31 y.o. female with a PMHx as noted below who presents for f/u of uncontrolled type 2 diabetes. Patient notes she is now 10 weeks pregnant. Blood sugars are starting to improve though still not at goal. Still not seen an OB. Current medications:  Invokana 300mg daily (HELD FOR PREGNANCY)  Metformin 1000mg BID (HELD FOR PREGNANCY)  Levemir 50 U once at bedtime  Humalog, now up to 50 units with each meal  Victoza (HELD FOR PREGNANCY)  Simvastatin 20mg daily (HELD FOR PREGNANCY)  Lisinopril 5mg daily (HELD FOR PREGNANCY)    Review of home glucose: (Over past week)  AM:  average  Lunch: 95 - 160  Dinner: 80 - 160  Bedtime: Not checked    Eats yogurt and banana at bedtime which raises blood sugar. Feels symptoms of hypoglycemia when blood sugar is in the  range. Still taking the vitamin D3        PAST MEDICAL/SURGICAL HISTORY:   Past Medical History:   Diagnosis Date    Diabetes mellitus ([facility] [address]     insulin    Hyperlipidemia     Obesity      Past Surgical History:   Procedure Laterality Date     DELIVERY ONLY          HX  SECTION  2011    breech       ALLERGIES:   No Known Allergies    MEDICATIONS ON ADMISSION:     Current Outpatient Prescriptions:     insulin lispro (HUMALOG) 100 unit/mL kwikpen, Up to 50 units with each meal, Disp: 8 Package, Rfl: 3    insulin detemir (LEVEMIR FLEXTOUCH) 100 unit/mL (3 mL) inpn, 50 units at bedtime, Disp: 15 Pen, Rfl: 5    glucose blood VI test strips (ONE TOUCH VERIO) strip, Use to test blood glucose 5x daily, Disp: 100 Strip, Rfl: 11    PNV with Ca,No.71-Iron-FA 27-1 mg tab, Take  by mouth., Disp: , Rfl:     SOCIAL HISTORY:   Social History     Social History    Marital status:      Spouse name: N/A    Number of children: N/A    Years of education: N/A     Occupational History    Not on file.      Social History Main Topics    Smoking status: Never Smoker    Smokeless tobacco: Never Used    Alcohol use No    Drug use: No    Sexual activity: Yes     Partners: Male     Birth control/ protection: None     Other Topics Concern    Not on file     Social History Narrative       FAMILY HISTORY:  Family History   Problem Relation Age of Onset    Diabetes Mother     Diabetes Father     Diabetes Sister     Diabetes Brother        REVIEW OF SYSTEMS: Complete ROS assessed and noted for that which is described above, all else are negative. Eyes: blurry vision  ENT: normal  CVS: normal  Resp: normal  GI: normal  : normal  GYN: normal  Endocrine: normal  Integument: normal  Musculoskeletal: normal  Neuro: parasthesias of feet  Psych: normal      PHYSICAL EXAMINATION:    VITAL SIGNS:  Visit Vitals    /57 (BP 1 Location: Left arm, BP Patient Position: Sitting)    Pulse 84    Ht 5' 1\" (1.549 m)    Wt 170 lb 11.2 oz (77.4 kg)    LMP 01/10/2017 (Exact Date)    BMI 32.25 kg/m2       GENERAL: NCAT, Sitting comfortably, NAD  EYES: EOMI, non-icteric, no proptosis  Ear/Nose/Throat: NCAT, no inflammation, no masses  LYMPH NODES: No LAD  CARDIOVASCULAR: S1 S2, RRR, No murmur  RESPIRATORY: CTA b/l, no wheeze/rales  GASTROINTESTINAL:  ND  MUSCULOSKELETAL: Normal ROM, no atrophy  SKIN: warm, no edema/rash/ or other skin changes  NEUROLOGIC: 5/5 power all extremities, no tremor, AAOx3  PSYCHIATRIC: Normal affect, Normal insight and judgement      REVIEW OF LABORATORY AND RADIOLOGY DATA:   Labs and documentation have been reviewed as described above. ASSESSMENT AND PLAN:   Eugenio Arechiga is a 31 y.o. female with a PMHx as noted above who presents for f/u of uncontrolled type 2 diabetes.      Type 2 diabetes Uncontrolled in Pregnancy  Hyperlipidemia  Hypertension  Vitamin D deficiency    Has not heard back yet from OBGYN, reports they were to call her but didn't, I asked her to be aggressive and call them again, she needs to be evaluated. Fasting hyperglycemia due to stacking at bedtime, and no bedtime blood sugars to review. Advised 4 oz, not the 8 oz of juice she was using when feeling down with levels in the 100's, to avoid rebound hyperglycemia. Plan: Type 2 Diabetes  Medications:  Levemir 50units at bedtime only  Adjust Humalog to 50 / 39 / 60  Off invokana, metformin, and victoza while pregnant  Check glucose ACHS (Need to check bedtime blood sugars also)  Goal Fasting blood sugar <90  Goal premeal blood sugar <120    HTN: BP stable, HOLD lisinopril 5mg FOR PREGNANCY  HLD:  HOLD Simvastatin 20mg daily FOR PREGNANCY  Vitamin D Deficiency: Continue 2000 units of D3 daily, should be on prenatal vitamins    RTC: 2 week f/u visit    Christine UPTON  [address] [facility]

## 2017-03-16 NOTE — MR AVS SNAPSHOT
Visit Information Date & Time Provider Department Dept. Phone Encounter #  
 3/16/2017 12:10 PM Frances Alatorre, [address] [place] [address] Diabetes and Endocrinology [phone] [id number] Upcoming Health Maintenance Date Due  
 EYE EXAM RETINAL OR DILATED Q1 12/6/1992 Pneumococcal 19-64 Medium Risk (1 of 1 - PPSV23) 12/6/2001 DTaP/Tdap/Td series (1 - Tdap) 12/6/2003 INFLUENZA AGE 9 TO ADULT 8/1/2016 HEMOGLOBIN A1C Q6M 9/7/2017 FOOT EXAM Q1 9/13/2017 MICROALBUMIN Q1 11/16/2017 LIPID PANEL Q1 11/16/2017 PAP AKA CERVICAL CYTOLOGY 2/13/2020 Allergies as of 3/16/2017  Review Complete On: 3/16/2017 By: Frances Alatorre MD  
 No Known Allergies Current Immunizations  Reviewed on 9/5/2014 Name Date Influenza High Dose Vaccine PF 10/1/2013 Not reviewed this visit You Were Diagnosed With   
  
 Codes Comments Diabetes mellitus complicating pregnancy, antepartum, first trimester    -  Primary ICD-10-CM: O24.911 ICD-9-CM: 648.03, 250.00 Essential hypertension with goal blood pressure less than 130/80     ICD-10-CM: I10 
ICD-9-CM: 401.9 Hyperlipidemia, unspecified hyperlipidemia type     ICD-10-CM: E78.5 ICD-9-CM: 272.4 Vitals BP Pulse Height(growth percentile) Weight(growth percentile) LMP BMI  
 105/57 (BP 1 Location: Left arm, BP Patient Position: Sitting) 84 5' 1\" (1.549 m) 170 lb 11.2 oz (77.4 kg) 01/10/2017 (Exact Date) 32.25 kg/m2 OB Status Smoking Status Pregnant Never Smoker Vitals History BMI and BSA Data Body Mass Index Body Surface Area  
 32.25 kg/m 2 1.83 m 2 Preferred Pharmacy Pharmacy Name Phone [facility] [address] [place] [address] Your Updated Medication List  
  
   
This list is accurate as of: 3/16/17 12:53 PM.  Always use your most recent med list.  
  
  
  
  
 glucose blood VI test strips strip Commonly known as:  Margarita Moyer  
 Use to test blood glucose 5x daily  
  
 insulin detemir 100 unit/mL (3 mL) Inpn Commonly known as:  LEVEMIR FLEXTOUCH  
50 units at bedtime  
  
 insulin lispro 100 unit/mL kwikpen Commonly known as:  HUMALOG Up to 50 units with each meal  
  
 PNV with Ca,No.71-Iron-FA 27-1 mg Tab Take  by mouth. Patient Instructions Levemir 50units at bedtime only Adjust Humalog to 50 / 45 / 60 Check glucose ACHS (Need to check bedtime blood sugars also) Goal Fasting blood sugar <90 Goal premeal blood sugar <120 
 
 
2 week follow up visit. 
---------------------------------------------------------------------------------------------------------------------- Below you will find a glucose log sheet which you can use to record your blood sugars. Without checking and recording what your home glucose levels are, it will be difficult to make any changes to your medication dose, even when significant changes may be needed. Please feel free to use the log below to record your home glucose levels. At the very least, I would like for you to login the entire 2-3 weeks just before your visit so we can make your visit much more productive and beneficial to you. GLUCOSE LOG SHEET: 
 
Date Breakfast Lunch Dinner Bedtime Comments ? GLUCOSE LOG SHEET: 
 
Date Breakfast Lunch Dinner Bedtime Comments ? GLUCOSE LOG SHEET: 
 
Date Breakfast Lunch Dinner Bedtime Comments ? Introducing [facility]! Pinky Cody introduces [employer] patient portal. Now you can access parts of your medical record, email your doctor's office, and request medication refills online. 1. In your internet browser, go to [email or web address] 2. Click on the First Time User? Click Here link in the Sign In box. You will see the New Member Sign Up page. 3. Enter your [employer] Access Code exactly as it appears below. You will not need to use this code after youve completed the sign-up process. If you do not sign up before the expiration date, you must request a new code. · [employer] Access Code: [email or web address] Expires: 3/20/2017  1:36 PM 
 
4. Enter the last four digits of your Social Security Number (xxxx) and Date of Birth (mm/dd/yyyy) as indicated and click Submit. You will be taken to the next sign-up page. 5. Create a [employer] ID. This will be your [employer] login ID and cannot be changed, so think of one that is secure and easy to remember. 6. Create a [employer] password. You can change your password at any time. 7. Enter your Password Reset Question and Answer. This can be used at a later time if you forget your password. 8. Enter your e-mail address. You will receive e-mail notification when new information is available in [address] 9. Click Sign Up. You can now view and download portions of your medical record. 10. Click the Download Summary menu link to download a portable copy of your medical information. If you have questions, please visit the Frequently Asked Questions section of the [employer] website. Remember, [employer] is NOT to be used for urgent needs. For medical emergencies, dial 911. Now available from your iPhone and Android! Please provide this summary of care documentation to your next provider. Your primary care clinician is listed as NONE. If you have any questions after today's visit, please call [phone].

## 2017-03-16 NOTE — TELEPHONE ENCOUNTER
Called wanting to know was the appt scheduled at [facility]. Was given the # to follow-up.

## 2017-04-07 ENCOUNTER — ROUTINE PRENATAL (OUTPATIENT)
Dept: OBGYN CLINIC | Age: 37
End: 2017-04-07

## 2017-04-07 ENCOUNTER — HOSPITAL ENCOUNTER (OUTPATIENT)
Dept: PERINATAL CARE | Age: 37
Discharge: HOME | End: 2017-04-07
Attending: OBSTETRICS & GYNECOLOGY
Payer: MEDICAID

## 2017-04-07 VITALS
HEART RATE: 92 BPM | BODY MASS INDEX: 33.04 KG/M2 | DIASTOLIC BLOOD PRESSURE: 66 MMHG | RESPIRATION RATE: 19 BRPM | HEIGHT: 61 IN | SYSTOLIC BLOOD PRESSURE: 102 MMHG | WEIGHT: 175 LBS

## 2017-04-07 DIAGNOSIS — O24.911 DIABETES MELLITUS AFFECTING PREGNANCY IN FIRST TRIMESTER: Primary | ICD-10-CM

## 2017-04-07 DIAGNOSIS — Z3A.12 12 WEEKS GESTATION OF PREGNANCY: ICD-10-CM

## 2017-04-07 PROCEDURE — 76813 OB US NUCHAL MEAS 1 GEST: CPT | Performed by: OBSTETRICS & GYNECOLOGY

## 2017-04-07 PROCEDURE — 76801 OB US < 14 WKS SINGLE FETUS: CPT | Performed by: OBSTETRICS & GYNECOLOGY

## 2017-04-07 PROCEDURE — 36416 COLLJ CAPILLARY BLOOD SPEC: CPT | Performed by: OBSTETRICS & GYNECOLOGY

## 2017-04-07 NOTE — PROGRESS NOTES
Seen at  center this morning, report pending; to see MFM in 4 weeks  Now seeing a different endocrinologist ? [place]

## 2017-04-11 ENCOUNTER — TELEPHONE (OUTPATIENT)
Dept: PERINATAL CARE | Age: 37
End: 2017-04-11

## 2017-04-13 ENCOUNTER — OFFICE VISIT (OUTPATIENT)
Dept: ENDOCRINOLOGY | Age: 37
End: 2017-04-13

## 2017-04-13 VITALS
DIASTOLIC BLOOD PRESSURE: 59 MMHG | HEART RATE: 98 BPM | BODY MASS INDEX: 33.14 KG/M2 | WEIGHT: 175.5 LBS | HEIGHT: 61 IN | SYSTOLIC BLOOD PRESSURE: 107 MMHG

## 2017-04-13 DIAGNOSIS — E78.5 HYPERLIPIDEMIA, UNSPECIFIED HYPERLIPIDEMIA TYPE: ICD-10-CM

## 2017-04-13 DIAGNOSIS — O24.911 DIABETES MELLITUS COMPLICATING PREGNANCY, ANTEPARTUM, FIRST TRIMESTER: Primary | ICD-10-CM

## 2017-04-13 DIAGNOSIS — I10 ESSENTIAL HYPERTENSION WITH GOAL BLOOD PRESSURE LESS THAN 130/80: ICD-10-CM

## 2017-04-13 LAB — HBA1C MFR BLD HPLC: 7.2 %

## 2017-04-13 NOTE — MR AVS SNAPSHOT
Visit Information Date & Time Provider Department Dept. Phone Encounter #  
 4/13/2017  9:50 AM Yesenia Cordova, [address] [facility] Diabetes and Endocrinology [phone] Follow-up Instructions Return in about 2 weeks (around 4/27/2017). Your Appointments 5/5/2017 10:00 AM  
OB VISIT with Fransisco Jane MD  
[place] ([address]) Appt Note: 4wk fob  - [facility] @ 8:45  
 [facility] [address] [facility] [phone]  
[facility] 31 [address] [place] Upcoming Health Maintenance Date Due  
 EYE EXAM RETINAL OR DILATED Q1 12/6/1992 Pneumococcal 19-64 Medium Risk (1 of 1 - PPSV23) 12/6/2001 DTaP/Tdap/Td series (1 - Tdap) 12/6/2003 INFLUENZA AGE 9 TO ADULT 8/1/2016 HEMOGLOBIN A1C Q6M 9/7/2017 FOOT EXAM Q1 9/13/2017 MICROALBUMIN Q1 11/16/2017 LIPID PANEL Q1 11/16/2017 PAP AKA CERVICAL CYTOLOGY 2/13/2020 Allergies as of 4/13/2017  Review Complete On: 4/13/2017 By: Yesenia Cordova MD  
 No Known Allergies Current Immunizations  Reviewed on 9/5/2014 Name Date Influenza High Dose Vaccine PF 10/1/2013 Not reviewed this visit You Were Diagnosed With   
  
 Codes Comments Diabetes mellitus complicating pregnancy, antepartum, first trimester    -  Primary ICD-10-CM: O24.911 ICD-9-CM: 648.03, 250.00 Essential hypertension with goal blood pressure less than 130/80     ICD-10-CM: I10 
ICD-9-CM: 401.9 Hyperlipidemia, unspecified hyperlipidemia type     ICD-10-CM: E78.5 ICD-9-CM: 272.4 Vitals BP Pulse Height(growth percentile) Weight(growth percentile) LMP BMI  
 107/59 (BP 1 Location: Left arm, BP Patient Position: Sitting) 98 5' 1\" (1.549 m) 175 lb 8 oz (79.6 kg) 01/10/2017 (Exact Date) 33.16 kg/m2 OB Status Smoking Status Pregnant Never Smoker Vitals History BMI and BSA Data Body Mass Index Body Surface Area  
 33.16 kg/m 2 1.85 m 2 Preferred Pharmacy Pharmacy Name Phone [facility] [address] [place] [address] Your Updated Medication List  
  
   
This list is accurate as of: 4/13/17 10:24 AM.  Always use your most recent med list.  
  
  
  
  
 glucose blood VI test strips strip Commonly known as:  Lisha Chain Use to test blood glucose 5x daily  
  
 insulin detemir 100 unit/mL (3 mL) Inpn Commonly known as:  LEVEMIR FLEXTOUCH  
50 units at bedtime  
  
 insulin lispro 100 unit/mL kwikpen Commonly known as:  HUMALOG Up to 50 units with each meal  
  
 PNV with Ca,No.71-Iron-FA 27-1 mg Tab Take  by mouth. We Performed the Following AMB POC HEMOGLOBIN A1C [08942 CPT(R)] Follow-up Instructions Return in about 2 weeks (around 4/27/2017). Patient Instructions No changes Bring glucose log on next visit, 
 
---------------------------------------------------------------------------------------------------------------------- Below you will find a glucose log sheet which you can use to record your blood sugars. Without checking and recording what your home glucose levels are, it will be difficult to make any changes to your medication dose, even when significant changes may be needed. Please feel free to use the log below to record your home glucose levels. At the very least, I would like for you to login the entire 2-3 weeks just before your visit so we can make your visit much more productive and beneficial to you. GLUCOSE LOG SHEET: 
 
Date Breakfast Lunch Dinner Bedtime Comments ? GLUCOSE LOG SHEET: 
 
 Date Breakfast Lunch Dinner Bedtime Comments ? GLUCOSE LOG SHEET: 
 
Date Breakfast Lunch Dinner Bedtime Comments ? Introducing [facility]! Anoop Lira introduces [employer] patient portal. Now you can access parts of your medical record, email your doctor's office, and request medication refills online. 1. In your internet browser, go to [email or web address] 2. Click on the First Time User? Click Here link in the Sign In box. You will see the New Member Sign Up page. 3. Enter your [employer] Access Code exactly as it appears below. You will not need to use this code after youve completed the sign-up process. If you do not sign up before the expiration date, you must request a new code. · [employer] Access Code: [email or web address] Expires: 6/27/2017  4:09 PM 
 
4. Enter the last four digits of your Social Security Number (xxxx) and Date of Birth (mm/dd/yyyy) as indicated and click Submit. You will be taken to the next sign-up page. 5. Create a [employer] ID. This will be your [employer] login ID and cannot be changed, so think of one that is secure and easy to remember. 6. Create a [employer] password. You can change your password at any time. 7. Enter your Password Reset Question and Answer. This can be used at a later time if you forget your password. 8. Enter your e-mail address. You will receive e-mail notification when new information is available in [address] 9. Click Sign Up. You can now view and download portions of your medical record. 10. Click the Download Summary menu link to download a portable copy of your medical information. If you have questions, please visit the Frequently Asked Questions section of the [employer] website. Remember, [employer] is NOT to be used for urgent needs. For medical emergencies, dial 911. Now available from your iPhone and Android! Please provide this summary of care documentation to your next provider. Your primary care clinician is listed as NONE. If you have any questions after today's visit, please call [phone].

## 2017-04-13 NOTE — PATIENT INSTRUCTIONS
No changes  Bring glucose log on next visit,    ----------------------------------------------------------------------------------------------------------------------    Below you will find a glucose log sheet which you can use to record your blood sugars. Without checking and recording what your home glucose levels are, it will be difficult to make any changes to your medication dose, even when significant changes may be needed. Please feel free to use the log below to record your home glucose levels. At the very least, I would like for you to login the entire 2-3 weeks just before your visit so we can make your visit much more productive and beneficial to you. GLUCOSE LOG SHEET:    Date Breakfast Lunch Dinner Bedtime Comments ? GLUCOSE LOG SHEET:    Date Breakfast Lunch Dinner Bedtime Comments ? GLUCOSE LOG SHEET:    Date Breakfast Lunch Dinner Bedtime Comments ?

## 2017-04-13 NOTE — PROGRESS NOTES
CHIEF COMPLAINT: f/u uncontrolled type 2 diabetes    HISTORY OF PRESENT ILLNESS:   Willie Santamaria is a 31 y.o. female with a PMHx as noted below who presents for f/u of uncontrolled type 2 diabetes. Patient notes she is now 14 weeks pregnant,   She has been cutting back on insulin on her own reporting feeling discomfort with lows and it creating anxiety for her. Her lowest blood sugar is 75, and notes that this makes her feel terrible. She has seen an OB recently and reports things are going ok. Current medications:  Invokana 300mg daily (HELD FOR PREGNANCY)  Metformin 1000mg BID (HELD FOR PREGNANCY)  Levemir 50 U once at bedtime  Humalog, now up to 50 units with each meal, (SHE IS ONLY TAKING 30-40)  Victoza (HELD FOR PREGNANCY)  Simvastatin 20mg daily (HELD FOR PREGNANCY)  Lisinopril 5mg daily (HELD FOR PREGNANCY)    Review of home glucose: (Over past week) NO LOG, FROM MEMORY  AM: 120-140  Lunch: 140-190  Dinner: 80 - 160  Bedtime: Not checked, but occasionally wakes 2-3 with a glucose of 70's    Eats apples at bedtime which raises blood sugar.      Still taking the vitamin D3        PAST MEDICAL/SURGICAL HISTORY:   Past Medical History:   Diagnosis Date    Diabetes mellitus ([facility] [address]     insulin    Hyperlipidemia     Obesity      Past Surgical History:   Procedure Laterality Date     DELIVERY ONLY          HX  SECTION  2011    breech       ALLERGIES:   No Known Allergies    MEDICATIONS ON ADMISSION:     Current Outpatient Prescriptions:     insulin lispro (HUMALOG) 100 unit/mL kwikpen, Up to 50 units with each meal, Disp: 8 Package, Rfl: 3    insulin detemir (LEVEMIR FLEXTOUCH) 100 unit/mL (3 mL) inpn, 50 units at bedtime, Disp: 15 Pen, Rfl: 5    glucose blood VI test strips (ONE TOUCH VERIO) strip, Use to test blood glucose 5x daily, Disp: 100 Strip, Rfl: 11    PNV with Ca,No.71-Iron-FA 27-1 mg tab, Take  by mouth., Disp: , Rfl:     SOCIAL HISTORY:   Social History     Social History    Marital status:      Spouse name: N/A    Number of children: N/A    Years of education: N/A     Occupational History    Not on file. Social History Main Topics    Smoking status: Never Smoker    Smokeless tobacco: Never Used    Alcohol use No    Drug use: No    Sexual activity: Yes     Partners: Male     Birth control/ protection: None     Other Topics Concern    Not on file     Social History Narrative       FAMILY HISTORY:  Family History   Problem Relation Age of Onset    Diabetes Mother     Diabetes Father     Diabetes Sister     Diabetes Brother        REVIEW OF SYSTEMS: Complete ROS assessed and noted for that which is described above, all else are negative. Eyes: blurry vision  ENT: normal  CVS: normal  Resp: normal  GI: normal  : normal  GYN: normal  Endocrine: normal  Integument: normal  Musculoskeletal: normal  Neuro: parasthesias of feet  Psych: normal      PHYSICAL EXAMINATION:    VITAL SIGNS:  Visit Vitals    /59 (BP 1 Location: Left arm, BP Patient Position: Sitting)    Pulse 98    Ht 5' 1\" (1.549 m)    Wt 175 lb 8 oz (79.6 kg)    LMP 01/10/2017 (Exact Date)    BMI 33.16 kg/m2       GENERAL: NCAT, Sitting comfortably, NAD  EYES: EOMI, non-icteric, no proptosis  Ear/Nose/Throat: NCAT, no inflammation, no masses  LYMPH NODES: No LAD  CARDIOVASCULAR: S1 S2, RRR, No murmur  RESPIRATORY: CTA b/l, no wheeze/rales  GASTROINTESTINAL:  ND  MUSCULOSKELETAL: Normal ROM, no atrophy  SKIN: warm, no edema/rash/ or other skin changes  NEUROLOGIC: 5/5 power all extremities, no tremor, AAOx3  PSYCHIATRIC: Normal affect, Normal insight and judgement      REVIEW OF LABORATORY AND RADIOLOGY DATA:   Labs and documentation have been reviewed as described above. ASSESSMENT AND PLAN:   Chris Toscano is a 31 y.o. female with a PMHx as noted above who presents for f/u of uncontrolled type 2 diabetes.      Type 2 diabetes Uncontrolled in Pregnancy  Hyperlipidemia  Hypertension  Vitamin D deficiency    POC A1c today is 7.2%  Difficult to know the best next step in management without her glucose log for record. Her lows are primarily at night and only go as low as 70's, and do not occur regularly. Need glucose log    Plan: Type 2 Diabetes  Medications:  Levemir 50units at bedtime only  Adjust Humalog to 40/40/30 which she is currently doing,  Off invokana, metformin, and victoza while pregnant  Check glucose ACHS (Need to check bedtime blood sugars also)  Goal Fasting blood sugar <90  Goal premeal blood sugar <120    HTN: BP stable, HOLD lisinopril 5mg FOR PREGNANCY  HLD:  HOLD Simvastatin 20mg daily FOR PREGNANCY  Vitamin D Deficiency: Continue 2000 units of D3 daily, should be on prenatal vitamins    RTC: 2 week f/u visit    Marisol UPTON  [address]  Diabetes & Endocrinology

## 2017-04-27 ENCOUNTER — OFFICE VISIT (OUTPATIENT)
Dept: ENDOCRINOLOGY | Age: 37
End: 2017-04-27

## 2017-04-27 VITALS
HEIGHT: 61 IN | HEART RATE: 91 BPM | WEIGHT: 179.4 LBS | SYSTOLIC BLOOD PRESSURE: 107 MMHG | BODY MASS INDEX: 33.87 KG/M2 | DIASTOLIC BLOOD PRESSURE: 70 MMHG

## 2017-04-27 DIAGNOSIS — I10 ESSENTIAL HYPERTENSION WITH GOAL BLOOD PRESSURE LESS THAN 130/80: ICD-10-CM

## 2017-04-27 DIAGNOSIS — E78.5 HYPERLIPIDEMIA, UNSPECIFIED HYPERLIPIDEMIA TYPE: ICD-10-CM

## 2017-04-27 DIAGNOSIS — O24.912 DIABETES MELLITUS COMPLICATING PREGNANCY, SECOND TRIMESTER: Primary | ICD-10-CM

## 2017-04-27 NOTE — PATIENT INSTRUCTIONS
Levemir 50units at bedtime only  Increase Humalog to 45/45/40    ----------------------------------------------------------------------------------------------------------------------    Below you will find a glucose log sheet which you can use to record your blood sugars. Without checking and recording what your home glucose levels are, it will be difficult to make any changes to your medication dose, even when significant changes may be needed. Please feel free to use the log below to record your home glucose levels. At the very least, I would like for you to login the entire 2-3 weeks just before your visit so we can make your visit much more productive and beneficial to you. GLUCOSE LOG SHEET:    Date Breakfast Lunch Dinner Bedtime Comments ? GLUCOSE LOG SHEET:    Date Breakfast Lunch Dinner Bedtime Comments ? GLUCOSE LOG SHEET:    Date Breakfast Lunch Dinner Bedtime Comments ?

## 2017-04-27 NOTE — PROGRESS NOTES
CHIEF COMPLAINT: f/u uncontrolled type 2 diabetes    HISTORY OF PRESENT ILLNESS:   Chad Wilhelm is a 31 y.o. female with a PMHx as noted below who presents for f/u of uncontrolled type 2 diabetes. Patient notes she is now 16 weeks pregnant,   She increased her insulin just a bit on her own,  Busy with the kids at home. Current medications:  Invokana 300mg daily (HELD FOR PREGNANCY)  Metformin 1000mg BID (HELD FOR PREGNANCY)  Levemir 50 U once at bedtime  Humalog, 40/40/40  Victoza (HELD FOR PREGNANCY)  Simvastatin 20mg daily (HELD FOR PREGNANCY)  Lisinopril 5mg daily (HELD FOR PREGNANCY)    Review of home glucose: (Over past week) NO LOG, FROM MEMORY  AM: 101-150  Lunch: 130-170 mostly  Dinner: 120-170 mostly  Bedtime: 108-130    Eats fetear (mediteranean bread) at bedtime sometimes. Still taking the vitamin D3        PAST MEDICAL/SURGICAL HISTORY:   Past Medical History:   Diagnosis Date    Diabetes mellitus ([facility] [address]     insulin    Hyperlipidemia     Obesity      Past Surgical History:   Procedure Laterality Date     DELIVERY ONLY          HX  SECTION  2011    breech       ALLERGIES:   No Known Allergies    MEDICATIONS ON ADMISSION:     Current Outpatient Prescriptions:     insulin lispro (HUMALOG) 100 unit/mL kwikpen, Up to 50 units with each meal, Disp: 8 Package, Rfl: 3    insulin detemir (LEVEMIR FLEXTOUCH) 100 unit/mL (3 mL) inpn, 50 units at bedtime, Disp: 15 Pen, Rfl: 5    glucose blood VI test strips (ONE TOUCH VERIO) strip, Use to test blood glucose 5x daily, Disp: 100 Strip, Rfl: 11    PNV with Ca,No.71-Iron-FA 27-1 mg tab, Take  by mouth., Disp: , Rfl:     SOCIAL HISTORY:   Social History     Social History    Marital status:      Spouse name: N/A    Number of children: N/A    Years of education: N/A     Occupational History    Not on file.      Social History Main Topics    Smoking status: Never Smoker    Smokeless tobacco: Never Used    Alcohol use No  Drug use: No    Sexual activity: Yes     Partners: Male     Birth control/ protection: None     Other Topics Concern    Not on file     Social History Narrative       FAMILY HISTORY:  Family History   Problem Relation Age of Onset    Diabetes Mother     Diabetes Father     Diabetes Sister     Diabetes Brother        REVIEW OF SYSTEMS: Complete ROS assessed and noted for that which is described above, all else are negative. Eyes: normal  ENT: normal  CVS: normal  Resp: normal  GI: normal  : normal  GYN: normal  Endocrine: normal  Integument: normal  Musculoskeletal: normal  Neuro: parasthesias of feet  Psych: normal      PHYSICAL EXAMINATION:    VITAL SIGNS:  Visit Vitals    /70 (BP 1 Location: Left arm, BP Patient Position: Sitting)    Pulse 91    Ht 5' 1\" (1.549 m)    Wt 179 lb 6.4 oz (81.4 kg)    LMP 01/10/2017 (Exact Date)    BMI 33.9 kg/m2       GENERAL: NCAT, Sitting comfortably, NAD  EYES: EOMI, non-icteric, no proptosis  Ear/Nose/Throat: NCAT, no inflammation, no masses  LYMPH NODES: No LAD  CARDIOVASCULAR: S1 S2, RRR, No murmur  RESPIRATORY: CTA b/l, no wheeze/rales  GASTROINTESTINAL:  ND  MUSCULOSKELETAL: Normal ROM, no atrophy  SKIN: warm, no edema/rash/ or other skin changes  NEUROLOGIC: 5/5 power all extremities, no tremor, AAOx3  PSYCHIATRIC: Normal affect, Normal insight and judgement      REVIEW OF LABORATORY AND RADIOLOGY DATA:   Labs and documentation have been reviewed as described above. ASSESSMENT AND PLAN:   Clemente Conley is a 31 y.o. female with a PMHx as noted above who presents for f/u of uncontrolled type 2 diabetes. Type 2 diabetes Uncontrolled in Pregnancy  Hyperlipidemia  Hypertension  Vitamin D deficiency    Post prandial hyperglycemia following breakfast and following lunch,   Need to adjust humalog,  Again we discussed the goals during pregnancy and why they are different compared with non-pregnant patients.    Discussed choice of snacks at bedtime to avoid excessive caloric intake. She is continuing off soda which is good. Plan: Type 2 Diabetes  Medications:  Levemir 50units at bedtime only  Increase Humalog to 45/45/40   Off invokana, metformin, and victoza while pregnant  Check glucose ACHS (Need to check bedtime blood sugars also)  Goal Fasting blood sugar <90  Goal premeal blood sugar <120    HTN: BP stable, HOLD lisinopril 5mg FOR PREGNANCY  HLD:  HOLD Simvastatin 20mg daily FOR PREGNANCY  Vitamin D Deficiency: Continue 2000 units of D3 daily, should be on prenatal vitamins    RTC: 2 week f/u visit    Merlin Savant T.  [address] [facility] Diabetes & Endocrinology

## 2017-04-27 NOTE — MR AVS SNAPSHOT
Visit Information Date & Time Provider Department Dept. Phone Encounter #  
 4/27/2017 10:30 AM Vasiliy South, [address] [place] [address] Diabetes and Endocrinology [phone] [id number] Your Appointments 5/11/2017 10:10 AM  
Follow Up with MD Dougie Reid [facility] End[facility]inology [address] [place] [address]) Appt Note: f/u dm  ok per dr. Marcie Berry [employer] [address] [phone] [address] [place] [address]  
  
    
 5/25/2017 10:10 AM  
Follow Up with MD Dougie Reid Diabetes and Endocrinology [address] [place] [address]) Appt Note: f/u  dm     ok per Dr. Marcie Berry [employer] [address] [phone] Upcoming Health Maintenance Date Due  
 EYE EXAM RETINAL OR DILATED Q1 12/6/1992 Pneumococcal 19-64 Medium Risk (1 of 1 - PPSV23) 12/6/2001 DTaP/Tdap/Td series (1 - Tdap) 12/6/2003 INFLUENZA AGE 9 TO ADULT 8/1/2016 FOOT EXAM Q1 9/13/2017 HEMOGLOBIN A1C Q6M 10/13/2017 MICROALBUMIN Q1 11/16/2017 LIPID PANEL Q1 11/16/2017 PAP AKA CERVICAL CYTOLOGY 2/13/2020 Allergies as of 4/27/2017  Review Complete On: 4/27/2017 By: Vasiliy South MD  
 No Known Allergies Current Immunizations  Reviewed on 9/5/2014 Name Date Influenza High Dose Vaccine PF 10/1/2013 Not reviewed this visit You Were Diagnosed With   
  
 Codes Comments Diabetes mellitus complicating pregnancy, second trimester    -  Primary ICD-10-CM: O24.912 ICD-9-CM: 648.03, 250.00 Essential hypertension with goal blood pressure less than 130/80     ICD-10-CM: I10 
ICD-9-CM: 401.9 Hyperlipidemia, unspecified hyperlipidemia type     ICD-10-CM: E78.5 ICD-9-CM: 272.4 Vitals  BP Pulse Height(growth percentile) Weight(growth percentile) LMP BMI  
 107/70 (BP 1 Location: Left arm, BP Patient Position: Sitting) 91 5' 1\" (1.549 m) 179 lb 6.4 oz (81.4 kg) 01/10/2017 (Exact Date) 33.9 kg/m2 OB Status Smoking Status Pregnant Never Smoker BMI and BSA Data Body Mass Index Body Surface Area 33.9 kg/m 2 1.87 m 2 Preferred Pharmacy Pharmacy Name Phone [facility] [address] [address] Your Updated Medication List  
  
   
This list is accurate as of: 4/27/17 11:04 AM.  Always use your most recent med list.  
  
  
  
  
 glucose blood VI test strips strip Commonly known as:  Ariane Conroy Use to test blood glucose 5x daily  
  
 insulin detemir 100 unit/mL (3 mL) Inpn Commonly known as:  LEVEMIR FLEXTOUCH  
50 units at bedtime  
  
 insulin lispro 100 unit/mL kwikpen Commonly known as:  HUMALOG Up to 50 units with each meal  
  
 PNV with Ca,No.71-Iron-FA 27-1 mg Tab Take  by mouth. Patient Instructions Levemir 50units at bedtime only Increase Humalog to 45/45/40 
 
---------------------------------------------------------------------------------------------------------------------- Below you will find a glucose log sheet which you can use to record your blood sugars. Without checking and recording what your home glucose levels are, it will be difficult to make any changes to your medication dose, even when significant changes may be needed. Please feel free to use the log below to record your home glucose levels. At the very least, I would like for you to login the entire 2-3 weeks just before your visit so we can make your visit much more productive and beneficial to you. GLUCOSE LOG SHEET: 
 
Date Breakfast Lunch Dinner Bedtime Comments ? GLUCOSE LOG SHEET: 
 
 Date Breakfast Lunch Dinner Bedtime Comments ? GLUCOSE LOG SHEET: 
 
Date Breakfast Lunch Dinner Bedtime Comments ? Introducing [facility]! Janet Lester introduces [employer] patient portal. Now you can access parts of your medical record, email your doctor's office, and request medication refills online. 1. In your internet browser, go to [email or web address] 2. Click on the First Time User? Click Here link in the Sign In box. You will see the New Member Sign Up page. 3. Enter your [employer] Access Code exactly as it appears below. You will not need to use this code after youve completed the sign-up process. If you do not sign up before the expiration date, you must request a new code. · [employer] Access Code: [email or web address] Expires: 6/27/2017  4:09 PM 
 
4. Enter the last four digits of your Social Security Number (xxxx) and Date of Birth (mm/dd/yyyy) as indicated and click Submit. You will be taken to the next sign-up page. 5. Create a Inspirato ID. This will be your [employer] login ID and cannot be changed, so think of one that is secure and easy to remember. 6. Create a In[employer] password. You can change your password at any time. 7. Enter your Password Reset Question and Answer. This can be used at a later time if you forget your password. 8. Enter your e-mail address. You will receive e-mail notification when new information is available in [address] 9. Click Sign Up. You can now view and download portions of your medical record. 10. Click the Download Summary menu link to download a portable copy of your medical information. If you have questions, please visit the Frequently Asked Questions section of the [employer] website. Remember, [employer] is NOT to be used for urgent needs. For medical emergencies, dial 911. Now available from your iPhone and Android! Please provide this summary of care documentation to your next provider. Your primary care clinician is listed as NONE. If you have any questions after today's visit, please call [phone].

## 2017-04-28 ENCOUNTER — HOSPITAL ENCOUNTER (EMERGENCY)
Age: 37
Discharge: HOME | End: 2017-04-28
Attending: EMERGENCY MEDICINE | Admitting: EMERGENCY MEDICINE
Payer: MEDICAID

## 2017-04-28 ENCOUNTER — APPOINTMENT (OUTPATIENT)
Dept: ULTRASOUND IMAGING | Age: 37
End: 2017-04-28
Attending: PHYSICIAN ASSISTANT
Payer: MEDICAID

## 2017-04-28 VITALS
SYSTOLIC BLOOD PRESSURE: 114 MMHG | HEART RATE: 87 BPM | BODY MASS INDEX: 36.33 KG/M2 | WEIGHT: 180.2 LBS | OXYGEN SATURATION: 99 % | HEIGHT: 59 IN | RESPIRATION RATE: 16 BRPM | TEMPERATURE: 98.7 F | DIASTOLIC BLOOD PRESSURE: 72 MMHG

## 2017-04-28 DIAGNOSIS — S60.212A CONTUSION OF LEFT WRIST, INITIAL ENCOUNTER: ICD-10-CM

## 2017-04-28 DIAGNOSIS — S39.92XA BACK INJURY, INITIAL ENCOUNTER: ICD-10-CM
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
## 2017-04-28 LAB
AMORPH CRY URNS QL MICRO: ABNORMAL
APPEARANCE UR: CLEAR
BACTERIA URNS QL MICRO: ABNORMAL /HPF
BILIRUB UR QL: NEGATIVE
COLOR UR: ABNORMAL
EPITH CASTS URNS QL MICRO: ABNORMAL /LPF
GLUCOSE UR STRIP.AUTO-MCNC: >1000 MG/DL
HGB UR QL STRIP: NEGATIVE
KETONES UR QL STRIP.AUTO: NEGATIVE MG/DL
LEUKOCYTE ESTERASE UR QL STRIP.AUTO: NEGATIVE
MUCOUS THREADS URNS QL MICRO: ABNORMAL /LPF
NITRITE UR QL STRIP.AUTO: NEGATIVE
PH UR STRIP: 7 [PH] (ref 5–8)
PROT UR STRIP-MCNC: NEGATIVE MG/DL
RBC #/AREA URNS HPF: ABNORMAL /HPF (ref 0–5)
SP GR UR REFRACTOMETRY: 1.03 (ref 1–1.03)
UROBILINOGEN UR QL STRIP.AUTO: 1 EU/DL (ref 0.2–1)
WBC URNS QL MICRO: ABNORMAL /HPF (ref 0–4)

## 2017-04-28 PROCEDURE — 99282 EMERGENCY DEPT VISIT SF MDM: CPT

## 2017-04-28 PROCEDURE — 76815 OB US LIMITED FETUS(S): CPT

## 2017-04-28 PROCEDURE — 81003 URINALYSIS AUTO W/O SCOPE: CPT | Performed by: PHYSICIAN ASSISTANT

## 2017-04-28 PROCEDURE — 74011250637 HC RX REV CODE- 250/637: Performed by: PHYSICIAN ASSISTANT

## 2017-04-28 RX ORDER — ACETAMINOPHEN 325 MG/1
650 TABLET ORAL
Status: COMPLETED | OUTPATIENT
Start: 2017-04-28 | End: 2017-04-28

## 2017-04-28 RX ADMIN — ACETAMINOPHEN 650 MG: 325 TABLET, FILM COATED ORAL at 20:01

## 2017-04-28 NOTE — ED TRIAGE NOTES
Pt. slipped and fell while chasing her child landing on her back. Pt. complains of dizziness currently and back pain. Pt. is about 5 months pregnant and is worried about her pregnancy since she has had 1 miscarriage.

## 2017-04-28 NOTE — ED PROVIDER NOTES
HPI Comments: 32 yo female , female, 15 weeks, followed by Dr. Marcia Mata presenting ambulatory to the ED with complaint of 4/10, lower back pain with associated left wrist pain and dizziness following a mechanical fall just PTA. Pt reports concern for pregnancy. Hx of previous miscarriage. She was running behind son and slipped and fell landing on back without LOC. Pain to the left wrist with movement. No associated numbness, tingling, neck pain, nausea, vomiting, vaginal bleeding, or vaginal discharge. Patient is a 31 y.o. female presenting with fall and back pain. The history is provided by the patient. Fall   The accident occurred less than 1 hour ago. The fall occurred while running. She fell from a height of ground level. She landed on grass. Point of impact: back and left wrist. Pain location: lower back and left wrist. The pain is at a severity of 4/10. The pain is mild. She was ambulatory at the scene. There was no entrapment after the fall. There was no drug use involved in the accident. There was no alcohol use involved in the accident. Pertinent negatives include no visual change, no fever, no numbness, no abdominal pain, no nausea, no vomiting, no headaches, no hearing loss, no loss of consciousness, no tingling and no laceration. The symptoms are aggravated by pressure on injury. She has tried nothing for the symptoms. The treatment provided no relief. Back Pain    Pertinent negatives include no chest pain, no fever, no numbness, no headaches, no abdominal pain and no tingling.         Past Medical History:   Diagnosis Date    Diabetes mellitus ([address]     insulin    Hyperlipidemia     Obesity        Past Surgical History:   Procedure Laterality Date     DELIVERY ONLY          HX  SECTION  2011    breech         Family History:   Problem Relation Age of Onset    Diabetes Mother     Diabetes Father     Diabetes Sister     Diabetes Brother        Social History Social History    Marital status:      Spouse name: N/A    Number of children: N/A    Years of education: N/A     Occupational History    Not on file. Social History Main Topics    Smoking status: Never Smoker    Smokeless tobacco: Never Used    Alcohol use No    Drug use: No    Sexual activity: Yes     Partners: Male     Birth control/ protection: None     Other Topics Concern    Not on file     Social History Narrative         ALLERGIES: Review of patient's allergies indicates no known allergies. Review of Systems   Constitutional: Negative. Negative for chills, fatigue and fever. HENT: Negative. Negative for congestion, ear pain, facial swelling, rhinorrhea, sneezing and sore throat. Eyes: Negative for pain, discharge and itching. Respiratory: Negative for cough, chest tightness and shortness of breath. Cardiovascular: Negative for chest pain. Gastrointestinal: Negative. Negative for abdominal distention, abdominal pain, constipation, diarrhea, nausea and vomiting. Genitourinary: Negative for difficulty urinating, frequency and urgency. Musculoskeletal: Positive for arthralgias (left wrist pain) and back pain. Negative for joint swelling, neck pain and neck stiffness. Skin: Negative for color change and rash. Neurological: Negative for dizziness, tingling, loss of consciousness, numbness and headaches. Psychiatric/Behavioral: Negative for confusion and decreased concentration. All other systems reviewed and are negative. Vitals:    04/28/17 1854   BP: 116/76   Pulse: 98   Resp: 20   Temp: 98.6 °F (37 °C)   SpO2: 98%   Weight: 81.7 kg (180 lb 3.2 oz)   Height: 4' 11\" (1.499 m)            Physical Exam   Constitutional: She is oriented to person, place, and time. She appears well-developed and well-nourished. No distress. Well appearing female in NAD   HENT:   Head: Normocephalic and atraumatic.    Right Ear: External ear normal.   Left Ear: External ear normal.   Nose: Nose normal.   Mouth/Throat: Oropharynx is clear and moist. No oropharyngeal exudate. Eyes: Conjunctivae and EOM are normal. Pupils are equal, round, and reactive to light. Right eye exhibits no discharge. Left eye exhibits no discharge. No scleral icterus. Neck: Normal range of motion. Neck supple. No spinous process tenderness and no muscular tenderness present. No rigidity. No edema, no erythema and normal range of motion present. Cardiovascular: Normal rate and regular rhythm. Exam reveals no gallop and no friction rub. No murmur heard. Pulmonary/Chest: Effort normal and breath sounds normal. She has no wheezes. She has no rales. Abdominal: Soft. Bowel sounds are normal. She exhibits no distension. There is no tenderness. There is no rebound and no guarding. Gravid     Musculoskeletal:        Left wrist: She exhibits tenderness (generalized). She exhibits normal range of motion, no bony tenderness, no swelling, no effusion and no deformity. Lumbar back: She exhibits tenderness (diffuse lower) and pain. She exhibits normal range of motion, no bony tenderness, no swelling, no edema, no spasm and normal pulse. Neurological: She is alert and oriented to person, place, and time. No cranial nerve deficit. Coordination normal.   Skin: Skin is warm and dry. No laceration noted. She is not diaphoretic. Psychiatric: She has a normal mood and affect. Her behavior is normal.   Nursing note and vitals reviewed. MDM  Number of Diagnoses or Management Options  Diagnosis management comments: 32 yo female , 15 weeks pregnant with back pain following mechanical fall. Suspect contusions. Unable to find [employer]. Will check US and reassess.  Leticia DE DIOS [place], [place]         Amount and/or Complexity of Data Reviewed  Tests in the radiology section of CPT®: ordered and reviewed  Independent visualization of images, tracings, or specimens: yes      ED Course       Procedures     Progress note    Imaging reviewed. Greg Cantu    Patient's results have been reviewed with them. Patient and/or family have verbally conveyed their understanding and agreement of the patient's signs, symptoms, diagnosis, treatment and prognosis and additionally agree to follow up as recommended or return to the Emergency Room should their condition change prior to follow-up. Discharge instructions have also been provided to the patient with some educational information regarding their diagnosis as well a list of reasons why they would want to return to the ER prior to their follow-up appointment should their condition change. Greg Cantu    A/P  Fall/back injury: Apply ice or moist heat in 20 minute intervals. Tylenol 1 gram every 8 hrs as needed for pain. Return for any new or worsening.  Greg Kmi

## 2017-04-29 NOTE — DISCHARGE INSTRUCTIONS
We hope that we have addressed all of your medical concerns. The examination and treatment you received in the Emergency Department were for an emergent problem and were not intended as complete care. It is important that you follow up with your healthcare provider(s) for ongoing care. If your symptoms worsen or do not improve as expected, and you are unable to reach your usual health care provider(s), you should return to the Emergency Department. Today's healthcare is undergoing tremendous change, and patient satisfaction surveys are one of the many tools to assess the quality of medical care. You may receive a survey from the [employer] regarding your experience in the Emergency Department. I hope that your experience has been completely positive, particularly the medical care that I provided. As such, please participate in the survey; anything less than excellent does not meet my expectations or intentions. [facility] and [address] [place] [address] participate in nationally recognized quality of care measures. If your blood pressure is greater than 120/80, as reported below, we urge that you seek medical care to address the potential of high blood pressure, commonly known as hypertension. Hypertension can be hereditary or can be caused by certain medical conditions, pain, stress, or \"white coat syndrome. \"       Please make an appointment with your health care provider(s) for follow up of your Emergency Department visit. VITALS:   Patient Vitals for the past 8 hrs:   Temp Pulse Resp BP SpO2   04/28/17 1854 98.6 °F (37 °C) 98 20 116/76 98 %          Thank you for allowing us to provide you with medical care today. We realize that you have many choices for your emergency care needs. Please choose us in the future for any continued health care needs. Regards,           April LANRE.  Christina, [address] Office: [phone]            Recent Results (from the past 24 hour(s))   URINALYSIS W/ RFLX MICROSCOPIC    Collection Time: 04/28/17  8:02 PM   Result Value Ref Range    Color YELLOW/STRAW      Appearance CLEAR CLEAR      Specific gravity 1.029 1.003 - 1.030      pH (UA) 7.0 5.0 - 8.0      Protein NEGATIVE  NEG mg/dL    Glucose >1000 (A) NEG mg/dL    Ketone NEGATIVE  NEG mg/dL    Bilirubin NEGATIVE  NEG      Blood NEGATIVE  NEG      Urobilinogen 1.0 0.2 - 1.0 EU/dL    Nitrites NEGATIVE  NEG      Leukocyte Esterase NEGATIVE  NEG         [employer]    Result Date: 4/28/2017  INDICATION:  Pregnancy with vaginal bleeding pain, ground-level fall COMPARISON:  None FINDINGS:  Realtime sonographic imaging of the pelvis was performed transabdominally. Within the uterus, there is a single intrauterine gestation. Constellation of measurements is consistent with 15 weeks gestation. The placenta and amniotic fluid volume cannot be well assessed by this technique due to early dates and small size. The gestational sac is normal in morphology. The ovaries are not visualized due to the gravid uterus. Fetal cardiac activity is present, 165 bpm. Cervix measures 3.2 cm. Placenta is posterior fundal in location. IMPRESSION: Single viable intrauterine pregnancy with estimated gestational age of 14 weeks. Preventing Falls: Care Instructions  Your Care Instructions  Getting around your home safely can be a challenge if you have injuries or health problems that make it easy for you to fall. Loose rugs and furniture in walkways are among the dangers for many older people who have problems walking or who have poor eyesight. People who have conditions such as arthritis, osteoporosis, or dementia also have to be careful not to fall. You can make your home safer with a few simple measures. Follow-up care is a key part of your treatment and safety.  Be sure to make and go to all appointments, and call your doctor if you are having problems. It's also a good idea to know your test results and keep a list of the medicines you take. How can you care for yourself at home? Taking care of yourself  · You may get dizzy if you do not drink enough water. To prevent dehydration, drink plenty of fluids, enough so that your urine is light yellow or clear like water. Choose water and other caffeine-free clear liquids. If you have kidney, heart, or liver disease and have to limit fluids, talk with your doctor before you increase the amount of fluids you drink. · Exercise regularly to improve your strength, muscle tone, and balance. Walk if you can. Swimming may be a good choice if you cannot walk easily. · Have your vision and hearing checked each year or any time you notice a change. If you have trouble seeing and hearing, you might not be able to avoid objects and could lose your balance. · Know the side effects of the medicines you take. Ask your doctor or pharmacist whether the medicines you take can affect your balance. Sleeping pills or sedatives can affect your balance. · Limit the amount of alcohol you drink. Alcohol can impair your balance and other senses. · Ask your doctor whether calluses or corns on your feet need to be removed. If you wear loose-fitting shoes because of calluses or corns, you can lose your balance and fall. · Talk to your doctor if you have numbness in your feet. Preventing falls at home  · Remove raised doorway thresholds, throw rugs, and clutter. Repair loose carpet or raised areas in the floor. · Move furniture and electrical cords to keep them out of walking paths. · Use nonskid floor wax, and wipe up spills right away, especially on ceramic tile floors. · If you use a walker or cane, put rubber tips on it. If you use crutches, clean the bottoms of them regularly with an abrasive pad, such as steel wool. · Keep your house well lit, especially [place], and outside walkways.  Use night-lights in areas such as hallways and bathrooms. Add extra light switches or use remote switches (such as switches that go on or off when you clap your hands) to make it easier to turn lights on if you have to get up during the night. · Install sturdy handrails on stairways. · Move items in your cabinets so that the things you use a lot are on the lower shelves (about waist level). · Keep a cordless phone and a flashlight with new batteries by your bed. If possible, put a phone in each of the main rooms of your house, or carry a cell phone in case you fall and cannot reach a phone. Or, you can wear a device around your neck or wrist. You push a button that sends a signal for help. · Wear low-heeled shoes that fit well and give your feet good support. Use footwear with nonskid soles. Check the heels and soles of your shoes for wear. Repair or replace worn heels or soles. · Do not wear socks without shoes on wood floors. · Walk on the grass when the sidewalks are slippery. If you live in an area that gets snow and ice in the winter, sprinkle salt on slippery steps and sidewalks. Preventing falls in the bath  · Install grab bars and nonskid mats inside and outside your shower or tub and near the toilet and sinks. · Use shower chairs and bath benches. · Use a hand-held shower head that will allow you to sit while showering. · Get into a tub or shower by putting the weaker leg in first. Get out of a tub or shower with your strong side first.  · Repair loose toilet seats and consider installing a raised toilet seat to make getting on and off the toilet easier. · Keep your bathroom door unlocked while you are in the shower. Where can you learn more? Go to http://tayler-eduardo.info/. Enter [phone] in the search box to learn more about \"Preventing Falls: Care Instructions. \"  Current as of: August 4, 2016  Content Version: 11.2  © 8014-3452 [employer].  Care instructions adapted under license by [address] (which disclaims liability or warranty for this information). If you have questions about a medical condition or this instruction, always ask your healthcare professional. [address] any warranty or liability for your use of this information.

## 2017-05-05 ENCOUNTER — HOSPITAL ENCOUNTER (OUTPATIENT)
Dept: PERINATAL CARE | Age: 37
Discharge: HOME | End: 2017-05-05
Attending: OBSTETRICS & GYNECOLOGY
Payer: MEDICAID

## 2017-05-05 PROCEDURE — 76815 OB US LIMITED FETUS(S): CPT | Performed by: OBSTETRICS & GYNECOLOGY

## 2017-05-25 ENCOUNTER — OFFICE VISIT (OUTPATIENT)
Dept: ENDOCRINOLOGY | Age: 37
End: 2017-05-25

## 2017-05-25 VITALS
HEART RATE: 98 BPM | HEIGHT: 59 IN | BODY MASS INDEX: 36.61 KG/M2 | SYSTOLIC BLOOD PRESSURE: 107 MMHG | WEIGHT: 181.6 LBS | DIASTOLIC BLOOD PRESSURE: 63 MMHG

## 2017-05-25 DIAGNOSIS — E78.5 HYPERLIPIDEMIA, UNSPECIFIED HYPERLIPIDEMIA TYPE: ICD-10-CM

## 2017-05-25 DIAGNOSIS — I10 ESSENTIAL HYPERTENSION WITH GOAL BLOOD PRESSURE LESS THAN 130/80: ICD-10-CM

## 2017-05-25 DIAGNOSIS — O24.912 DIABETES MELLITUS COMPLICATING PREGNANCY, SECOND TRIMESTER: Primary | ICD-10-CM

## 2017-05-25 LAB — HBA1C MFR BLD HPLC: 7.4 %

## 2017-05-25 NOTE — PROGRESS NOTES
CHIEF COMPLAINT: f/u uncontrolled type 2 diabetes    HISTORY OF PRESENT ILLNESS:   Unknown Ishmael is a 31 y.o. female with a PMHx as noted below who presents for f/u of uncontrolled type 2 diabetes. Patient is now 20 weeks pregnant, she requested an A1c today, POC is 7.4%. Forgetting to take her levemir 2-3 times / week. Current medications:  Invokana 300mg daily (HELD FOR PREGNANCY)  Metformin 1000mg BID (HELD FOR PREGNANCY)  Levemir 50 U once at bedtime  Humalog 50/50/50   Victoza (HELD FOR PREGNANCY)  Simvastatin 20mg daily (HELD FOR PREGNANCY)  Lisinopril 5mg daily (HELD FOR PREGNANCY)    Review of home glucose: (Over past week) NO LOG, FROM [facility]  AM: 107-160  Lunch: 160-170   Dinner: 120-160  Bedtime: was not checked    Not hungry, only eating beans and fries. Still taking the vitamin D3        PAST MEDICAL/SURGICAL HISTORY:   Past Medical History:   Diagnosis Date    Diabetes mellitus ([facility] [address]     insulin    Hyperlipidemia     Obesity      Past Surgical History:   Procedure Laterality Date     DELIVERY ONLY          HX  SECTION  2011    breech       ALLERGIES:   No Known Allergies    MEDICATIONS ON ADMISSION:     Current Outpatient Prescriptions:     insulin lispro (HUMALOG) 100 unit/mL kwikpen, Up to 50 units with each meal, Disp: 8 Package, Rfl: 3    insulin detemir (LEVEMIR FLEXTOUCH) 100 unit/mL (3 mL) inpn, 50 units at bedtime, Disp: 15 Pen, Rfl: 5    glucose blood VI test strips (ONE TOUCH VERIO) strip, Use to test blood glucose 5x daily, Disp: 100 Strip, Rfl: 11    PNV with Ca,No.71-Iron-FA 27-1 mg tab, Take  by mouth., Disp: , Rfl:     SOCIAL HISTORY:   Social History     Social History    Marital status:      Spouse name: N/A    Number of children: N/A    Years of education: N/A     Occupational History    Not on file. Social History Main Topics    Smoking status: Never Smoker    Smokeless tobacco: Never Used    Alcohol use No    Drug use:  No  Sexual activity: Yes     Partners: Male     Birth control/ protection: None     Other Topics Concern    Not on file     Social History Narrative       FAMILY HISTORY:  Family History   Problem Relation Age of Onset    Diabetes Mother     Diabetes Father     Diabetes Sister     Diabetes Brother        REVIEW OF SYSTEMS: Complete ROS assessed and noted for that which is described above, all else are negative. Eyes: normal  ENT: normal  CVS: normal  Resp: normal  GI: normal  : normal  GYN: normal  Endocrine: normal  Integument: normal  Musculoskeletal: normal  Neuro: parasthesias of feet  Psych: normal      PHYSICAL EXAMINATION:    VITAL SIGNS:  Visit Vitals    /63 (BP 1 Location: Left arm, BP Patient Position: Sitting)    Pulse 98    Ht 4' 11\" (1.499 m)    Wt 181 lb 9.6 oz (82.4 kg)    LMP 01/10/2017 (Exact Date)    BMI 36.68 kg/m2       GENERAL: NCAT, Sitting comfortably, NAD  EYES: EOMI, non-icteric, no proptosis  Ear/Nose/Throat: NCAT, no inflammation, no masses  LYMPH NODES: No LAD  CARDIOVASCULAR: S1 S2, RRR, No murmur  RESPIRATORY: CTA b/l, no wheeze/rales  GASTROINTESTINAL:  ND  MUSCULOSKELETAL: Normal ROM, no atrophy  SKIN: warm, no edema/rash/ or other skin changes  NEUROLOGIC: 5/5 power all extremities, no tremor, AAOx3  PSYCHIATRIC: Normal affect, Normal insight and judgement      REVIEW OF LABORATORY AND RADIOLOGY DATA:   Labs and documentation have been reviewed as described above. ASSESSMENT AND PLAN:   Nurys Osorio is a 31 y.o. female with a PMHx as noted above who presents for f/u of uncontrolled type 2 diabetes. Type 2 diabetes Uncontrolled in Pregnancy  Hyperlipidemia  Hypertension  Vitamin D deficiency    Post prandial hyperglycemia after breakfast resulting in lunchtime highs, I believe adjusting this dose will help smooth out the rest of her day since going from lunch to dinner blood sugars are stable.  This will also help numbers \"down stream\" in terms of \"following morning\" fasting levels. It is true that missing her basal insulin occaisonally is a big contributor but we have clear evidence of post prandial hyperlgycemia as a big issue for her. Missing long acting insulin several times per week  Faye Pippins as a main component of meals is also a contributor    Plan: Type 2 Diabetes  Medications:  Levemir 50units at bedtime only  Increase Humalog to 57/50/50  Off invokana, metformin, and victoza while pregnant  Check glucose ACHS (Need to check bedtime blood sugars also)  Goal Fasting blood sugar <90  Goal premeal blood sugar <120    HTN: BP stable, HOLD lisinopril 5mg FOR PREGNANCY  HLD:  HOLD Simvastatin 20mg daily FOR PREGNANCY  Vitamin D Deficiency: Continue 2000 units of D3 daily, should be on prenatal vitamins    RTC: 2 week f/u visit    Kj UPTON  [address] [facility] End[facility]

## 2017-05-25 NOTE — MR AVS SNAPSHOT
Visit Information Date & Time Provider Department Dept. Phone Encounter #  
 5/25/2017 10:10 AM Tianna Tran, [place] [facility] Diabetes and Endocrinology [phone] Your Appointments 6/8/2017  9:50 AM  
Follow Up with MD Dougie Armenta Diabetes and Endocrinology [facility] [place]) [facility] [address] [phone] [address] [place] [address] Road  
  
    
 6/22/2017  9:50 AM  
Follow Up with MD Hayley Armentaton Diabetes and Endocrinology [facility] [place]) [address] [phone] [phone] Upcoming Health Maintenance Date Due  
 EYE EXAM RETINAL OR DILATED Q1 12/6/1992 Pneumococcal 19-64 Medium Risk (1 of 1 - PPSV23) 12/6/2001 DTaP/Tdap/Td series (1 - Tdap) 12/6/2003 INFLUENZA AGE 9 TO ADULT 8/1/2017 FOOT EXAM Q1 9/13/2017 HEMOGLOBIN A1C Q6M 10/13/2017 MICROALBUMIN Q1 11/16/2017 LIPID PANEL Q1 11/16/2017 PAP AKA CERVICAL CYTOLOGY 2/13/2020 Allergies as of 5/25/2017  Review Complete On: 5/25/2017 By: Tianna Tran MD  
 No Known Allergies Current Immunizations  Reviewed on 9/5/2014 Name Date Influenza High Dose Vaccine PF 10/1/2013 Not reviewed this visit You Were Diagnosed With   
  
 Codes Comments Diabetes mellitus complicating pregnancy, second trimester    -  Primary ICD-10-CM: O24.912 ICD-9-CM: 648.03, 250.00 Hyperlipidemia, unspecified hyperlipidemia type     ICD-10-CM: E78.5 ICD-9-CM: 272.4 Essential hypertension with goal blood pressure less than 130/80     ICD-10-CM: I10 
ICD-9-CM: 401.9 Vitals BP Pulse Height(growth percentile) Weight(growth percentile) LMP BMI  
 107/63 (BP 1 Location: Left arm, BP Patient Position: Sitting) 98 4' 11\" (1.499 m) 181 lb 9.6 oz (82.4 kg) 01/10/2017 (Exact Date) 36.68 kg/m2 OB Status Smoking Status Pregnant Never Smoker BMI and BSA Data Body Mass Index Body Surface Area  
 36.68 kg/m 2 1.85 m 2 Preferred Pharmacy Pharmacy Name Phone [facility] [address] [address] Your Updated Medication List  
  
   
This list is accurate as of: 5/25/17 10:31 AM.  Always use your most recent med list.  
  
  
  
  
 glucose blood VI test strips strip Commonly known as:  Shan Reeve Use to test blood glucose 5x daily  
  
 insulin detemir 100 unit/mL (3 mL) Inpn Commonly known as:  LEVEMIR FLEXTOUCH  
50 units at bedtime  
  
 insulin lispro 100 unit/mL kwikpen Commonly known as:  HUMALOG Up to 50 units with each meal  
  
 PNV with Ca,No.71-Iron-FA 27-1 mg Tab Take  by mouth. Patient Instructions Levemir 50units at bedtime only Increase Humalog to 57/50/50 
 
---------------------------------------------------------------------------------------------------------------------- Below you will find a glucose log sheet which you can use to record your blood sugars. Without checking and recording what your home glucose levels are, it will be difficult to make any changes to your medication dose, even when significant changes may be needed. Please feel free to use the log below to record your home glucose levels. At the very least, I would like for you to login the entire 2-3 weeks just before your visit so we can make your visit much more productive and beneficial to you. GLUCOSE LOG SHEET: 
 
Date Breakfast Lunch Dinner Bedtime Comments ? GLUCOSE LOG SHEET: 
 
Date Breakfast Lunch Dinner Bedtime Comments ? GLUCOSE LOG SHEET: 
 
Date Breakfast Lunch Dinner Bedtime Comments ? Introducing [facility]! Iker Hardybob introduces [employer] patient portal. Now you can access parts of your medical record, email your doctor's office, and request medication refills online. 1. In your internet browser, go to [email or web address] 2. Click on the First Time User? Click Here link in the Sign In box. You will see the New Member Sign Up page. 3. Enter your [employer] Access Code exactly as it appears below. You will not need to use this code after youve completed the sign-up process. If you do not sign up before the expiration date, you must request a new code. · [employer] Access Code: [email or web address] Expires: 6/27/2017  4:09 PM 
 
4. Enter the last four digits of your Social Security Number (xxxx) and Date of Birth (mm/dd/yyyy) as indicated and click Submit. You will be taken to the next sign-up page. 5. Create a [employer] ID. This will be your [employer] login ID and cannot be changed, so think of one that is secure and easy to remember. 6. Create a [employer] password. You can change your password at any time. 7. Enter your Password Reset Question and Answer. This can be used at a later time if you forget your password. 8. Enter your e-mail address. You will receive e-mail notification when new information is available in [address] 9. Click Sign Up. You can now view and download portions of your medical record. 10. Click the Download Summary menu link to download a portable copy of your medical information. If you have questions, please visit the Frequently Asked Questions section of the [employer] website. Remember, [employer] is NOT to be used for urgent needs. For medical emergencies, dial 911. Now available from your iPhone and Android! Please provide this summary of care documentation to your next provider. Your primary care clinician is listed as Mis Lindquist. If you have any questions after today's visit, please call [phone].

## 2017-05-25 NOTE — PATIENT INSTRUCTIONS
Levemir 50units at bedtime only  Increase Humalog to 57/50/50    ----------------------------------------------------------------------------------------------------------------------    Below you will find a glucose log sheet which you can use to record your blood sugars. Without checking and recording what your home glucose levels are, it will be difficult to make any changes to your medication dose, even when significant changes may be needed. Please feel free to use the log below to record your home glucose levels. At the very least, I would like for you to login the entire 2-3 weeks just before your visit so we can make your visit much more productive and beneficial to you. GLUCOSE LOG SHEET:    Date Breakfast Lunch Dinner Bedtime Comments ? GLUCOSE LOG SHEET:    Date Breakfast Lunch Dinner Bedtime Comments ? GLUCOSE LOG SHEET:    Date Breakfast Lunch Dinner Bedtime Comments ?

## 2017-06-01 ENCOUNTER — HOSPITAL ENCOUNTER (OUTPATIENT)
Dept: PERINATAL CARE | Age: 37
Discharge: HOME | End: 2017-06-01
Attending: OBSTETRICS & GYNECOLOGY
Payer: MEDICAID

## 2017-06-01 PROCEDURE — 76811 OB US DETAILED SNGL FETUS: CPT | Performed by: OBSTETRICS & GYNECOLOGY

## 2017-06-20 ENCOUNTER — OFFICE VISIT (OUTPATIENT)
Dept: ENDOCRINOLOGY | Age: 37
End: 2017-06-20

## 2017-06-20 VITALS
BODY MASS INDEX: 37.58 KG/M2 | DIASTOLIC BLOOD PRESSURE: 68 MMHG | SYSTOLIC BLOOD PRESSURE: 116 MMHG | WEIGHT: 186.4 LBS | HEART RATE: 102 BPM | HEIGHT: 59 IN

## 2017-06-20 DIAGNOSIS — E78.2 MIXED HYPERLIPIDEMIA: ICD-10-CM

## 2017-06-20 DIAGNOSIS — E78.5 HYPERLIPIDEMIA, UNSPECIFIED HYPERLIPIDEMIA TYPE: ICD-10-CM

## 2017-06-20 DIAGNOSIS — O24.912 DIABETES MELLITUS COMPLICATING PREGNANCY, SECOND TRIMESTER: Primary | ICD-10-CM

## 2017-06-20 LAB — HBA1C MFR BLD HPLC: 7.5 %

## 2017-06-20 RX ORDER — INSULIN LISPRO 100 [IU]/ML
INJECTION, SOLUTION INTRAVENOUS; SUBCUTANEOUS
Qty: 10 PACKAGE | Refills: 3 | Status: SHIPPED | OUTPATIENT
Start: 2017-06-20 | End: 2017-07-04 | Stop reason: SDUPTHER

## 2017-06-20 NOTE — PROGRESS NOTES
CHIEF COMPLAINT: f/u uncontrolled type 2 diabetes    HISTORY OF PRESENT ILLNESS:   Adeline Fernando is a 31 y.o. female with a PMHx as noted below who presents for f/u of uncontrolled type 2 diabetes. Patient is now 23 weeks pregnant, she requested an A1c today, POC is 7.5%. Now taking the levemir and the humalog more regularly    Current medications:  Invokana 300mg daily (HELD FOR PREGNANCY)  Metformin 1000mg BID (HELD FOR PREGNANCY)  Levemir 50units at bedtime only  Humalog 56-60 units each meal.   Victoza (HELD FOR PREGNANCY)  Simvastatin 20mg daily (HELD FOR PREGNANCY)  Lisinopril 5mg daily (HELD FOR PREGNANCY)    Review of home glucose: (Over past week)  AM: 170-190  Lunch: 250-280  Dinner: 200-260  Bedtime: was not checked        PAST MEDICAL/SURGICAL HISTORY:   Past Medical History:   Diagnosis Date    Diabetes mellitus (HCC)     insulin    Hyperlipidemia     Obesity      Past Surgical History:   Procedure Laterality Date     DELIVERY ONLY          HX  SECTION  2011    breech       ALLERGIES:   No Known Allergies    MEDICATIONS ON ADMISSION:     Current Outpatient Prescriptions:     insulin lispro (HUMALOG) 100 unit/mL kwikpen, Up to 50 units with each meal, Disp: 8 Package, Rfl: 3    insulin detemir (LEVEMIR FLEXTOUCH) 100 unit/mL (3 mL) inpn, 50 units at bedtime, Disp: 15 Pen, Rfl: 5    glucose blood VI test strips (ONE TOUCH VERIO) strip, Use to test blood glucose 5x daily, Disp: 100 Strip, Rfl: 11    PNV with Ca,No.71-Iron-FA 27-1 mg tab, Take  by mouth., Disp: , Rfl:     SOCIAL HISTORY:   Social History     Social History    Marital status:      Spouse name: N/A    Number of children: N/A    Years of education: N/A     Occupational History    Not on file.      Social History Main Topics    Smoking status: Never Smoker    Smokeless tobacco: Never Used    Alcohol use No    Drug use: No    Sexual activity: Yes     Partners: Male     Birth control/ protection: None     Other Topics Concern    Not on file     Social History Narrative       FAMILY HISTORY:  Family History   Problem Relation Age of Onset    Diabetes Mother     Diabetes Father     Diabetes Sister     Diabetes Brother        REVIEW OF SYSTEMS: Complete ROS assessed and noted for that which is described above, all else are negative. Eyes: normal  ENT: normal  CVS: normal  Resp: normal  GI: normal  : normal  GYN: normal  Endocrine: normal  Integument: normal  Musculoskeletal: normal  Neuro: parasthesias of feet  Psych: normal      PHYSICAL EXAMINATION:    VITAL SIGNS:  Visit Vitals    /68 (BP 1 Location: Left arm, BP Patient Position: Sitting)    Pulse (!) 102    Ht 4' 11\" (1.499 m)    Wt 186 lb 6.4 oz (84.6 kg)    LMP 01/10/2017 (Exact Date)    BMI 37.65 kg/m2       GENERAL: NCAT, Sitting comfortably, NAD  EYES: EOMI, non-icteric, no proptosis  Ear/Nose/Throat: NCAT, no inflammation, no masses  LYMPH NODES: No LAD  CARDIOVASCULAR: S1 S2, RRR, No murmur  RESPIRATORY: CTA b/l, no wheeze/rales  GASTROINTESTINAL:  ND  MUSCULOSKELETAL: Normal ROM, no atrophy  SKIN: warm, no edema/rash/ or other skin changes  NEUROLOGIC: 5/5 power all extremities, no tremor, AAOx3  PSYCHIATRIC: Normal affect, Normal insight and judgement      REVIEW OF LABORATORY AND RADIOLOGY DATA:   Labs and documentation have been reviewed as described above. ASSESSMENT AND PLAN:   Loki Maxwell is a 31 y.o. female with a PMHx as noted above who presents for f/u of uncontrolled type 2 diabetes. Type 2 diabetes Uncontrolled in Pregnancy  Hyperlipidemia  Hypertension  Vitamin D deficiency    Patient with significant hyperglycemia. Reporting she is not comfortable with the increasing doses of insulin. I advised her that she should expect that we can even make it up to 100 units with each meal by the 3rd trimester. We discussed the importance to avoid hyperglycemia in pregnancy.      Plan: Type 2 Diabetes  Medications:  Levemir 50units at bedtime only  Increase Humalog to 70 units each meal (35U x 2 injections, each meal)  Start a 1:15 correction scale  Off invokana, metformin, and victoza while pregnant  Check glucose ACHS (Need to check bedtime blood sugars also)  Goal Fasting blood sugar <90  Goal premeal blood sugar <120    HTN: BP stable, HOLD lisinopril 5mg FOR PREGNANCY  HLD:  HOLD Simvastatin 20mg daily FOR PREGNANCY  Vitamin D Deficiency: Continue 2000 units of D3 daily, should be on prenatal vitamins    RTC: 2 week f/u visit    aSmira UPTON  [address] [facility] Diabetes & Endocrinology

## 2017-06-20 NOTE — PATIENT INSTRUCTIONS
Continue levemir at 50 units at bedtime    Increase Humalog to 70 units with each meal : You can take it as 2 injections of 35 units with each meal.     Start a correction scale of 1 unit for every 15 points of glucose above 150:  Correction Scale  1:15>150    Currently you are taking insulin with your meals. As we have discussed it is important to always check your glucose level just before taking your mealtime insulin dose. Sometimes before eating your meal, your glucose level is already much higher than the goal and so you may require a few units of extra insulin. This is in addition to the scheduled dose that you plan to take for the meal. Using the scale below, add the amount of extra insulin you need to the dose you already plan to take and inject together as one injection. As always continue to monitor your glucose afterward to assure recovery of your glucose levels. IF GLUCOSE IS:                 THEN TAKE:      0   Extra Unit  150-165   1   Extra Unit  165-180   2   Extra Units  180-195   3   Extra Units  195-210   4   Extra Units  210-225   5   Extra Units  225-240   6   Extra Units  240-255   7   Extra Units  255-270   8   Extra Units  270-285   9   Extra Units  285-300   10 Extra Units    Example: My planned insulin dose:    ____ Units    +    ____ Extra Correction Units  =  ____ Units  The result is the total dose to take for the meal.      ----------------------------------------------------------------------------------------------------------------------    Below you will find a glucose log sheet which you can use to record your blood sugars. Without checking and recording what your home glucose levels are, it will be difficult to make any changes to your medication dose, even when significant changes may be needed. Please feel free to use the log below to record your home glucose levels.  At the very least, I would like for you to login the entire 2-3 weeks just before your visit so we can make your visit much more productive and beneficial to you. GLUCOSE LOG SHEET:    Date Breakfast Lunch Dinner Bedtime Comments ? GLUCOSE LOG SHEET:    Date Breakfast Lunch Dinner Bedtime Comments ? GLUCOSE LOG SHEET:    Date Breakfast Lunch Dinner Bedtime Comments ? Dr. Perico Dietz to basics:    When you have diabetes or pre-diabetes, as you know, your body has difficulty dealing with the sugar it absorbs from your meals. An unrelated but very relevant term you may have heard before, quantitative easing, has a new meaning: By gradually reducing the load of sugars entering the body, you ease the stress on the body and allow it to catch up with the work it must do. This in turn will allow your body to work better. On top of this, remember one point, the more sugar stress your body has, the more you enter a state of glucose toxicity and this is a state where you become even more resistant. Thats right higher sugar = more resistance, not only to your own bodies attempt to fix the problem but also resistance to your medications. This is why medications work best when a proper diet is followed. Tip 1. Dont forget the protein.  When you add a portion of meat or other low carb protein food in your meal, it provides healthy calories which contribute to reducing that feeling of hunger that drives you to eat what you dont want. Tip 2. Portions are a real thing. Before you eat, stop and look at your plate/table. Count how many items have sugars/carbs in them. A sandwich (bread) ? Stephanie Otter ? Sweet drink ? Potatoe ? Pasta ? Rice ? You would be surprised when you become aware. Aim for a reasonable portion of carbs, and if you feel you absolutely cannot do this, at least start working toward this. 45-60 grams is usually more than enough in one meal. And YES, than includes the desert! Tip 3. Three meals per day, snack-free in between! Your body needs a break. Eating an adequate meal keeps you from getting hungry and reaching for a snack in between meals. Recall that most of the time, diabetic patients are not treating these snacks. Dont eat dinner late at night, but rather allow more overnight fasting time for your body to recover. If you eat dinner at 8 PM, try 6 PM.  Sporadic eating is the opposite of what you need, and adjusting to a regular eating schedule such as this will not only be a great benefit to your body, but your medications will also tend to work better at keeping your diabetes under control. Tip 4. There is no best diet when it comes to weight loss. When comparing diets and outcomes, the main ingredient when searching for weight loss was calories ! Lower calories = better weight loss. Pick a healthy diet thats right for you, i.e. diabetes friendly, and evaluate your daily calorie intake. And of course this would be of no use without exercise. Calories in need calories out.

## 2017-06-20 NOTE — MR AVS SNAPSHOT
Visit Information Date & Time Provider Department Dept. Phone Encounter #  
 6/20/2017  9:50 AM [place]an [place] [address] [facility] [id number] Your Appointments 7/6/2017  9:50 AM  
Follow Up with Ani Tran MD  
[place] [facility] [address] [facility]) Appt Note: 2 week f/u Diabetes (OK per Dr. Valerie Bowles) [phone] [address] [place] [address] Upcoming Health Maintenance Date Due  
 EYE EXAM RETINAL OR DILATED Q1 12/6/1992 Pneumococcal 19-64 Medium Risk (1 of 1 - PPSV23) 12/6/2001 DTaP/Tdap/Td series (1 - Tdap) 12/6/2003 INFLUENZA AGE 9 TO ADULT 8/1/2017 FOOT EXAM Q1 9/13/2017 MICROALBUMIN Q1 11/16/2017 LIPID PANEL Q1 11/16/2017 HEMOGLOBIN A1C Q6M 11/25/2017 PAP AKA CERVICAL CYTOLOGY 2/13/2020 Allergies as of 6/20/2017  Review Complete On: 6/20/2017 By: Ani Tran MD  
 No Known Allergies Current Immunizations  Reviewed on 9/5/2014 Name Date Influenza High Dose Vaccine PF 10/1/2013 Not reviewed this visit You Were Diagnosed With   
  
 Codes Comments Diabetes mellitus complicating pregnancy, second trimester    -  Primary ICD-10-CM: O24.912 ICD-9-CM: 648.03, 250.00 Hyperlipidemia, unspecified hyperlipidemia type     ICD-10-CM: E78.5 ICD-9-CM: 272.4 Mixed hyperlipidemia     ICD-10-CM: E78.2 ICD-9-CM: 272.2 Vitals BP Pulse Height(growth percentile) Weight(growth percentile) LMP BMI  
 116/68 (BP 1 Location: Left arm, BP Patient Position: Sitting) (!) 102 4' 11\" (1.499 m) 186 lb 6.4 oz (84.6 kg) 01/10/2017 (Exact Date) 37.65 kg/m2 OB Status Smoking Status Pregnant Never Smoker BMI and BSA Data Body Mass Index Body Surface Area  
 37.65 kg/m 2 1.88 m 2 Preferred Pharmacy Pharmacy Name Phone [facility] [address] [facility] 78 Your Updated Medication List  
  
   
This list is accurate as of: 6/20/17 10:04 AM.  Always use your most recent med list.  
  
  
  
  
 glucose blood VI test strips strip Commonly known as:  Zulay Beech Use to test blood glucose 5x daily  
  
 insulin detemir 100 unit/mL (3 mL) Inpn Commonly known as:  LEVEMIR FLEXTOUCH  
50 units at bedtime  
  
 insulin lispro 100 unit/mL kwikpen Commonly known as:  HUMALOG Up to 50 units with each meal  
  
 PNV with Ca,No.71-Iron-FA 27-1 mg Tab Take  by mouth. Patient Instructions Continue levemir at 50 units at bedtime Increase Humalog to 70 units with each meal : You can take it as 2 injections of 35 units with each meal.  
 
Start a correction scale of 1 unit for every 15 points of glucose above 150: 
Correction Scale  1:15>150 Currently you are taking insulin with your meals. As we have discussed it is important to always check your glucose level just before taking your mealtime insulin dose. Sometimes before eating your meal, your glucose level is already much higher than the goal and so you may require a few units of extra insulin. This is in addition to the scheduled dose that you plan to take for the meal. Using the scale below, add the amount of extra insulin you need to the dose you already plan to take and inject together as one injection. As always continue to monitor your glucose afterward to assure recovery of your glucose levels. IF GLUCOSE IS:                 THEN TAKE: 
    0   Extra Unit 
150-165   1   Extra Unit 
165-180   2   Extra Units 180-195   3   Extra Units 195-210   4   Extra Units 210-225   5   Extra Units 225-240   6   Extra Units 240-255   7   Extra Units 255-270   8   Extra Units 164-540   9   Extra Units 285-300   10 Extra Units Example: My planned insulin dose:    ____ Units    +    ____ Extra Correction Units  =  ____ Units The result is the total dose to take for the meal. 
 
 
---------------------------------------------------------------------------------------------------------------------- Below you will find a glucose log sheet which you can use to record your blood sugars. Without checking and recording what your home glucose levels are, it will be difficult to make any changes to your medication dose, even when significant changes may be needed. Please feel free to use the log below to record your home glucose levels. At the very least, I would like for you to login the entire 2-3 weeks just before your visit so we can make your visit much more productive and beneficial to you. GLUCOSE LOG SHEET: 
 
Date Breakfast Lunch Dinner Bedtime Comments ? GLUCOSE LOG SHEET: 
 
Date Breakfast Lunch Dinner Bedtime Comments ? GLUCOSE LOG SHEET: 
 
Date Breakfast Lunch Dinner Bedtime Comments ? Dr. Brando Arrieta Back to basics: 
 
When you have diabetes or pre-diabetes, as you know, your body has difficulty dealing with the sugar it absorbs from your meals.  An unrelated but very relevant term you may have heard before, quantitative easing, has a new meaning: By gradually reducing the load of sugars entering the body, you ease the stress on the body and allow it to catch up with the work it must do. This in turn will allow your body to work better. On top of this, remember one point, the more sugar stress your body has, the more you enter a state of glucose toxicity and this is a state where you become even more resistant. Thats right higher sugar = more resistance, not only to your own bodies attempt to fix the problem but also resistance to your medications. This is why medications work best when a proper diet is followed. Tip 1. Dont forget the protein. When you add a portion of meat or other low carb protein food in your meal, it provides healthy calories which contribute to reducing that feeling of hunger that drives you to eat what you dont want. Tip 2. Portions are a real thing. Before you eat, stop and look at your plate/table. Count how many items have sugars/carbs in them. A sandwich (bread) ? Elmon Reining ? Sweet drink ? Potatoe ? Pasta ? Rice ? You would be surprised when you become aware. Aim for a reasonable portion of carbs, and if you feel you absolutely cannot do this, at least start working toward this. 45-60 grams is usually more than enough in one meal. And YES, than includes the desert! Tip 3. Three meals per day, snack-free in between! Your body needs a break. Eating an adequate meal keeps you from getting hungry and reaching for a snack in between meals. Recall that most of the time, diabetic patients are not treating these snacks. Dont eat dinner late at night, but rather allow more overnight fasting time for your body to recover.  If you eat dinner at 8 PM, try 6 PM.  Sporadic eating is the opposite of what you need, and adjusting to a regular eating schedule such as this will not only be a great benefit to your body, but your medications will also tend to work better at keeping your diabetes under control. Tip 4. There is no best diet when it comes to weight loss. When comparing diets and outcomes, the main ingredient when searching for weight loss was calories ! Lower calories = better weight loss. Pick a healthy diet thats right for you, i.e. diabetes friendly, and evaluate your daily calorie intake. And of course this would be of no use without exercise. Calories in need calories out. Introducing [facility]! [employer] introduces Blog[employer] patient portal. Now you can access parts of your medical record, email your doctor's office, and request medication refills online. 1. In your internet browser, go to [email or web address] 2. Click on the First Time User? Click Here link in the Sign In box. You will see the New Member Sign Up page. 3. Enter your BlogGlue Access Code exactly as it appears below. You will not need to use this code after youve completed the sign-up process. If you do not sign up before the expiration date, you must request a new code. · BlogGlue Access Code: [email or web address] Expires: 6/27/2017  4:09 PM 
 
4. Enter the last four digits of your Social Security Number (xxxx) and Date of Birth (mm/dd/yyyy) as indicated and click Submit. You will be taken to the next sign-up page. 5. Create a BlogGlue ID. This will be your BlogGlue login ID and cannot be changed, so think of one that is secure and easy to remember. 6. Create a BlogGlue password. You can change your password at any time. 7. Enter your Password Reset Question and Answer. This can be used at a later time if you forget your password. 8. Enter your e-mail address. You will receive e-mail notification when new information is available in [address] 9. Click Sign Up. You can now view and download portions of your medical record. 10. Click the Download Summary menu link to download a portable copy of your medical information. If you have questions, please visit the Frequently Asked Questions section of the [employer] website. Remember, [employer] is NOT to be used for urgent needs. For medical emergencies, dial 911. Now available from your iPhone and Android! Please provide this summary of care documentation to your next provider. Your primary care clinician is listed as Gilberto Shipley. If you have any questions after today's visit, please call [phone].

## 2017-06-22 ENCOUNTER — TELEPHONE (OUTPATIENT)
Dept: ENDOCRINOLOGY | Age: 37
End: 2017-06-22

## 2017-06-22 DIAGNOSIS — E11.65 UNCONTROLLED TYPE 2 DIABETES MELLITUS WITH HYPERGLYCEMIA, WITH LONG-TERM CURRENT USE OF INSULIN (HCC): ICD-10-CM

## 2017-06-22 DIAGNOSIS — Z79.4 UNCONTROLLED TYPE 2 DIABETES MELLITUS WITH HYPERGLYCEMIA, WITH LONG-TERM CURRENT USE OF INSULIN (HCC): ICD-10-CM

## 2017-06-22 DIAGNOSIS — E78.2 MIXED HYPERLIPIDEMIA: ICD-10-CM

## 2017-06-27 ENCOUNTER — HOSPITAL ENCOUNTER (OUTPATIENT)
Dept: PERINATAL CARE | Age: 37
Discharge: HOME | End: 2017-06-27
Attending: OBSTETRICS & GYNECOLOGY
Payer: MEDICAID

## 2017-06-27 PROCEDURE — 76816 OB US FOLLOW-UP PER FETUS: CPT | Performed by: OBSTETRICS & GYNECOLOGY

## 2017-07-04 RX ORDER — INSULIN LISPRO 100 [IU]/ML
INJECTION, SOLUTION INTRAVENOUS; SUBCUTANEOUS
Qty: 6 PACKAGE | Refills: 3 | Status: ON HOLD | OUTPATIENT
Start: 2017-07-04 | End: 2017-07-24

## 2017-07-07 ENCOUNTER — TELEPHONE (OUTPATIENT)
Dept: ENDOCRINOLOGY | Age: 37
End: 2017-07-07

## 2017-07-07 RX ORDER — INSULIN GLARGINE 100 [IU]/ML
INJECTION, SOLUTION SUBCUTANEOUS
Qty: 15 PEN | Refills: 3 | Status: SHIPPED | OUTPATIENT
Start: 2017-07-07 | End: 2017-07-11 | Stop reason: SDUPTHER

## 2017-07-07 NOTE — TELEPHONE ENCOUNTER
----- Message from Anjelica Ram sent at 7/6/2017  5:43 PM EDT -----  Regarding: Dr. José Liao / Telephone  Pt is requesting a new prescription other than Levomir as the insurance will not cover it.  Please send a new Rx to [address] [place] [address] [address] and pt best contact number is [phone]

## 2017-07-07 NOTE — TELEPHONE ENCOUNTER
I have ordered lantus in place of levemir, no change in dose, 3 month supply,    Thanks,    Jeramy UPTON  [address] [facility]  [address] [place] [address]

## 2017-07-07 NOTE — TELEPHONE ENCOUNTER
I called Angelica back and she said that her insurance will not cover the Levemir. She can not get it from the free clinic anymore because she has insurance now. I call the insurance company and they will cover Lantus.   Marek Constantino

## 2017-07-11 ENCOUNTER — OFFICE VISIT (OUTPATIENT)
Dept: ENDOCRINOLOGY | Age: 37
End: 2017-07-11

## 2017-07-11 VITALS
SYSTOLIC BLOOD PRESSURE: 137 MMHG | HEART RATE: 120 BPM | BODY MASS INDEX: 37.9 KG/M2 | HEIGHT: 59 IN | WEIGHT: 188 LBS | DIASTOLIC BLOOD PRESSURE: 84 MMHG

## 2017-07-11 DIAGNOSIS — O24.912 DIABETES MELLITUS COMPLICATING PREGNANCY, SECOND TRIMESTER: Primary | ICD-10-CM

## 2017-07-11 DIAGNOSIS — I10 ESSENTIAL HYPERTENSION WITH GOAL BLOOD PRESSURE LESS THAN 130/80: ICD-10-CM

## 2017-07-11 DIAGNOSIS — E78.5 HYPERLIPIDEMIA, UNSPECIFIED HYPERLIPIDEMIA TYPE: ICD-10-CM

## 2017-07-11 RX ORDER — INSULIN GLARGINE 100 [IU]/ML
INJECTION, SOLUTION SUBCUTANEOUS
Qty: 15 PEN | Refills: 3 | Status: ON HOLD | OUTPATIENT
Start: 2017-07-11 | End: 2017-07-24

## 2017-07-11 NOTE — PATIENT INSTRUCTIONS
Levemir 50units at bedtime only  Increase Humalog to 90 units each meal     No More Soda, No alcohol at all. Correction Scale  1:15>150    Currently you are taking insulin with your meals. As we have discussed it is important to always check your glucose level just before taking your mealtime insulin dose. Sometimes before eating your meal, your glucose level is already much higher than the goal and so you may require a few units of extra insulin. This is in addition to the scheduled dose that you plan to take for the meal. Using the scale below, add the amount of extra insulin you need to the dose you already plan to take and inject together as one injection. As always continue to monitor your glucose afterward to assure recovery of your glucose levels. IF GLUCOSE IS:                 THEN TAKE:      0   Extra Unit  150-165   1   Extra Unit  165-180   2   Extra Units  180-195   3   Extra Units  195-210   4   Extra Units  210-225   5   Extra Units  225-240   6   Extra Units  240-255   7   Extra Units  255-270   8   Extra Units  270-285   9   Extra Units  285-300   10 Extra Units    ----------------------------------------------------------------------------------------------------------------------    Below you will find a glucose log sheet which you can use to record your blood sugars. Without checking and recording what your home glucose levels are, it will be difficult to make any changes to your medication dose, even when significant changes may be needed. Please feel free to use the log below to record your home glucose levels. At the very least, I would like for you to login the entire 2-3 weeks just before your visit so we can make your visit much more productive and beneficial to you. GLUCOSE LOG SHEET:    Date Breakfast Lunch Dinner Bedtime Comments ? GLUCOSE LOG SHEET:    Date Breakfast Lunch Dinner Bedtime Comments ? GLUCOSE LOG SHEET:    Date Breakfast Lunch Dinner Bedtime Comments ? Dr. Anam Hdz to basics:    When you have diabetes or pre-diabetes, as you know, your body has difficulty dealing with the sugar it absorbs from your meals. An unrelated but very relevant term you may have heard before, quantitative easing, has a new meaning: By gradually reducing the load of sugars entering the body, you ease the stress on the body and allow it to catch up with the work it must do. This in turn will allow your body to work better. On top of this, remember one point, the more sugar stress your body has, the more you enter a state of glucose toxicity and this is a state where you become even more resistant. Thats right higher sugar = more resistance, not only to your own bodies attempt to fix the problem but also resistance to your medications. This is why medications work best when a proper diet is followed. Tip 1. Dont forget the protein. When you add a portion of meat or other low carb protein food in your meal, it provides healthy calories which contribute to reducing that feeling of hunger that drives you to eat what you dont want. Tip 2. Portions are a real thing. Before you eat, stop and look at your plate/table. Count how many items have sugars/carbs in them.   A sandwich (bread) ? Harrie Seton ? Sweet drink ? Potatoe ? Pasta ? Rice ? You would be surprised when you become aware. Aim for a reasonable portion of carbs, and if you feel you absolutely cannot do this, at least start working toward this. 45-60 grams is usually more than enough in one meal. And YES, than includes the desert! Tip 3. Three meals per day, snack-free in between! Your body needs a break. Eating an adequate meal keeps you from getting hungry and reaching for a snack in between meals. Recall that most of the time, diabetic patients are not treating these snacks. Dont eat dinner late at night, but rather allow more overnight fasting time for your body to recover. If you eat dinner at 8 PM, try 6 PM.  Sporadic eating is the opposite of what you need, and adjusting to a regular eating schedule such as this will not only be a great benefit to your body, but your medications will also tend to work better at keeping your diabetes under control. Tip 4. There is no best diet when it comes to weight loss. When comparing diets and outcomes, the main ingredient when searching for weight loss was calories ! Lower calories = better weight loss. Pick a healthy diet thats right for you, i.e. diabetes friendly, and evaluate your daily calorie intake. And of course this would be of no use without exercise. Calories in need calories out.

## 2017-07-11 NOTE — PROGRESS NOTES
CHIEF COMPLAINT: f/u uncontrolled type 2 diabetes    HISTORY OF PRESENT ILLNESS:   Juan M Ang is a 31 y.o. female with a PMHx as noted below who presents for f/u of uncontrolled type 2 diabetes. Patient is now 26 weeks pregnant, feeling the fatigue of pregnancy. Current medications:  Invokana 300mg daily (HELD FOR PREGNANCY)  Metformin 1000mg BID (HELD FOR PREGNANCY)  Lantus 50 units at bedtime only  Humalog 75-80 units each meal.   Victoza (HELD FOR PREGNANCY)  Simvastatin 20mg daily (HELD FOR PREGNANCY)  Lisinopril 5mg daily (HELD FOR PREGNANCY)    Review of home glucose: (Over past week)  AM: 512-616  Lunch: 202-250  Dinner: 113-200  Bedtime: was not checked    Prefers chick yu a, fried chicken sandwhich, dinking coca cola 2-3 times per day. Notes that she has been drinking what she believes may be a fruity alcohol drink up to 8 times per day. PAST MEDICAL/SURGICAL HISTORY:   Past Medical History:   Diagnosis Date    Diabetes mellitus ([address]     insulin    Hyperlipidemia     Obesity      Past Surgical History:   Procedure Laterality Date     DELIVERY ONLY          HX  SECTION  2011    breech       ALLERGIES:   No Known Allergies    MEDICATIONS ON ADMISSION:     Current Outpatient Prescriptions:     insulin glargine (LANTUS,BASAGLAR) 100 unit/mL (3 mL) inpn, 50 units subcutaneously at each bedtime, Disp: 15 Pen, Rfl: 3    insulin lispro (HUMALOG) 100 unit/mL kwikpen, Up to 90 units with each meal  Indications: Diabetes in pregnancy, uncontrolled, Disp: 6 Package, Rfl: 3    glucose blood VI test strips (ONE TOUCH VERIO) strip, Use to test blood glucose 5x daily, Disp: 100 Strip, Rfl: 11    PNV with Ca,No.71-Iron-FA 27-1 mg tab, Take  by mouth., Disp: , Rfl:     SOCIAL HISTORY:   Social History     Social History    Marital status:      Spouse name: N/A    Number of children: N/A    Years of education: N/A     Occupational History    Not on file.      Social History Main Topics    Smoking status: Never Smoker    Smokeless tobacco: Never Used    Alcohol use No    Drug use: No    Sexual activity: Yes     Partners: Male     Birth control/ protection: None     Other Topics Concern    Not on file     Social History Narrative       FAMILY HISTORY:  Family History   Problem Relation Age of Onset    Diabetes Mother     Diabetes Father     Diabetes Sister     Diabetes Brother        REVIEW OF SYSTEMS: Complete ROS assessed and noted for that which is described above, all else are negative. Eyes: normal  ENT: normal  CVS: normal  Resp: normal  GI: normal  : normal  GYN: normal  Endocrine: normal  Integument: normal  Musculoskeletal: normal  Neuro: parasthesias of feet  Psych: normal      PHYSICAL EXAMINATION:    VITAL SIGNS:  Visit Vitals    /84 (BP 1 Location: Left arm, BP Patient Position: Sitting)    Pulse (!) 120    Ht 4' 11\" (1.499 m)    Wt 188 lb (85.3 kg)    LMP 01/10/2017 (Exact Date)    BMI 37.97 kg/m2       GENERAL: NCAT, Sitting comfortably, NAD  EYES: EOMI, non-icteric, no proptosis  Ear/Nose/Throat: NCAT, no inflammation, no masses  LYMPH NODES: No LAD  CARDIOVASCULAR: S1 S2, RRR, No murmur  RESPIRATORY: CTA b/l, no wheeze/rales  GASTROINTESTINAL:  ND  MUSCULOSKELETAL: Normal ROM, no atrophy  SKIN: warm, no edema/rash/ or other skin changes  NEUROLOGIC: 5/5 power all extremities, no tremor, AAOx3  PSYCHIATRIC: Normal affect, Normal insight and judgement      REVIEW OF LABORATORY AND RADIOLOGY DATA:   Labs and documentation have been reviewed as described above. ASSESSMENT AND PLAN:   Chris Sethi is a 31 y.o. female with a PMHx as noted above who presents for f/u of uncontrolled type 2 diabetes. Type 2 diabetes Uncontrolled in Pregnancy  Hyperlipidemia  Hypertension  Vitamin D deficiency    Patient has returned to drinking regular soda about 3 times per day.  Also she reported drinking what her  advised her was alcoholic beverage (peach flavor) almost around 8 times per day. I spent time counseling her on this poor decision and how it affects the child. She reports that it may not be alcoholic, but I am suspicious considering that her  had advised her it was alcohol, and that she was going to purchase it from the store herself as well. She notes that she is going to double check when she gets home. She describes that she could not help her craving. I again advised her that the lifestyle she is living and beverages she is consuming is adversely affecting her baby and is not fair for the baby, and that she absolutely must discontinue it without and not drink it again under any circumstances. I have advised my nurse Janet Enriquez to call and check up on her tomorrow to make sure she has avoided it. Also to find out if her beverages were actually alcohol. Plan: Type 2 Diabetes  Medications:  Levemir 50units at bedtime only  Increase Humalog to 90 units each meal   Start a 1:15 correction scale  Off invokana, metformin, and victoza while pregnant  Check glucose ACHS (Need to check bedtime blood sugars also)  Goal Fasting blood sugar <90  Goal premeal blood sugar <120    HTN: BP stable, HOLD lisinopril 5mg FOR PREGNANCY  HLD:  HOLD Simvastatin 20mg daily FOR PREGNANCY  Vitamin D Deficiency: Continue 2000 units of D3 daily, should be on prenatal vitamins    RTC: 2 week f/u visit    Delfina UPTON  [address] [facility]

## 2017-07-11 NOTE — MR AVS SNAPSHOT
Visit Information Date & Time Provider Department Dept. Phone Encounter #  
 7/11/2017  9:50 AM Chai Hyman, [facility] Diabetes [facility] End[facility]ology [phone] [id number] Follow-up Instructions Return in about 6 weeks (around 8/22/2017). Your Appointments 8/1/2017  9:50 AM  
Follow Up with MD Dougie Bess Diabetes and Endocrinology [facility] [phone] [address] [place] [address] Upcoming Health Maintenance Date Due  
 EYE EXAM RETINAL OR DILATED Q1 12/6/1992 Pneumococcal 19-64 Medium Risk (1 of 1 - PPSV23) 12/6/2001 DTaP/Tdap/Td series (1 - Tdap) 12/6/2003 INFLUENZA AGE 9 TO ADULT 8/1/2017 FOOT EXAM Q1 9/13/2017 MICROALBUMIN Q1 11/16/2017 LIPID PANEL Q1 11/16/2017 HEMOGLOBIN A1C Q6M 12/20/2017 PAP AKA CERVICAL CYTOLOGY 2/13/2020 Allergies as of 7/11/2017  Review Complete On: 7/11/2017 By: Chai Hyman MD  
 No Known Allergies Current Immunizations  Reviewed on 9/5/2014 Name Date Influenza High Dose Vaccine PF 10/1/2013 Not reviewed this visit You Were Diagnosed With   
  
 Codes Comments Diabetes mellitus complicating pregnancy, second trimester    -  Primary ICD-10-CM: O24.912 ICD-9-CM: 648.03, 250.00 Hyperlipidemia, unspecified hyperlipidemia type     ICD-10-CM: E78.5 ICD-9-CM: 272.4 Essential hypertension with goal blood pressure less than 130/80     ICD-10-CM: I10 
ICD-9-CM: 401.9 Vitals BP Pulse Height(growth percentile) Weight(growth percentile) LMP BMI  
 137/84 (BP 1 Location: Left arm, BP Patient Position: Sitting) (!) 120 4' 11\" (1.499 m) 188 lb (85.3 kg) 01/10/2017 (Exact Date) 37.97 kg/m2 OB Status Smoking Status Pregnant Never Smoker BMI and BSA Data  Body Mass Index Body Surface Area  
 37.97 kg/m 2 1.88 m 2  
 Preferred Pharmacy Pharmacy Name Phone [facility] [address] [facility] [place] [address] Your Updated Medication List  
  
   
This list is accurate as of: 7/11/17 10:18 AM.  Always use your most recent med list.  
  
  
  
  
 glucose blood VI test strips strip Commonly known as:  Gilmar Trotter Use to test blood glucose 5x daily  
  
 insulin glargine 100 unit/mL (3 mL) Inpn Commonly known as:  LANTUS,BASAGLAR  
50 units subcutaneously at each bedtime  
  
 insulin lispro 100 unit/mL kwikpen Commonly known as:  HUMALOG Up to 90 units with each meal  Indications: Diabetes in pregnancy, uncontrolled PNV with Ca,No.71-Iron-FA 27-1 mg Tab Take  by mouth. Follow-up Instructions Return in about 6 weeks (around 8/22/2017). Patient Instructions Levemir 50units at bedtime only Increase Humalog to 90 units each meal  
 
No More Soda, No alcohol at all. Correction Scale  1:15>150 Currently you are taking insulin with your meals. As we have discussed it is important to always check your glucose level just before taking your mealtime insulin dose. Sometimes before eating your meal, your glucose level is already much higher than the goal and so you may require a few units of extra insulin. This is in addition to the scheduled dose that you plan to take for the meal. Using the scale below, add the amount of extra insulin you need to the dose you already plan to take and inject together as one injection. As always continue to monitor your glucose afterward to assure recovery of your glucose levels. IF GLUCOSE IS:                 THEN TAKE: 
    0   Extra Unit 
150-165   1   Extra Unit 
165-180   2   Extra Units 180-195   3   Extra Units 195-210   4   Extra Units 210-225   5   Extra Units 225-240   6   Extra Units 240-255   7   Extra Units 255-270   8   Extra Units 185-134   9   Extra Units 285-300   10 Extra Units ---------------------------------------------------------------------------------------------------------------------- Below you will find a glucose log sheet which you can use to record your blood sugars. Without checking and recording what your home glucose levels are, it will be difficult to make any changes to your medication dose, even when significant changes may be needed. Please feel free to use the log below to record your home glucose levels. At the very least, I would like for you to login the entire 2-3 weeks just before your visit so we can make your visit much more productive and beneficial to you. GLUCOSE LOG SHEET: 
 
Date Breakfast Lunch Dinner Bedtime Comments ? GLUCOSE LOG SHEET: 
 
Date Breakfast Lunch Dinner Bedtime Comments ? GLUCOSE LOG SHEET: 
 
Date Breakfast Lunch Dinner Bedtime Comments ? Dr. Isaak Murillo Back to basics: 
 
When you have diabetes or pre-diabetes, as you know, your body has difficulty dealing with the sugar it absorbs from your meals.  An unrelated but very relevant term you may have heard before, quantitative musaing, has a new meaning: By gradually reducing the load of sugars entering the body, you ease the stress on the body and allow it to catch up with the work it must do. This in turn will allow your body to work better. On top of this, remember one point, the more sugar stress your body has, the more you enter a state of glucose toxicity and this is a state where you become even more resistant. Thats right higher sugar = more resistance, not only to your own bodies attempt to fix the problem but also resistance to your medications. This is why medications work best when a proper diet is followed. Tip 1. Dont forget the protein. When you add a portion of meat or other low carb protein food in your meal, it provides healthy calories which contribute to reducing that feeling of hunger that drives you to eat what you dont want. Tip 2. Portions are a real thing. Before you eat, stop and look at your plate/table. Count how many items have sugars/carbs in them. A sandwich (bread) ? Sharlynn Landaverde ? Sweet drink ? Potatoe ? Pasta ? Rice ? You would be surprised when you become aware. Aim for a reasonable portion of carbs, and if you feel you absolutely cannot do this, at least start working toward this. 45-60 grams is usually more than enough in one meal. And YES, than includes the desert! Tip 3. Three meals per day, snack-free in between! Your body needs a break. Eating an adequate meal keeps you from getting hungry and reaching for a snack in between meals. Recall that most of the time, diabetic patients are not treating these snacks. Dont eat dinner late at night, but rather allow more overnight fasting time for your body to recover. If you eat dinner at 8 PM, try 6 PM.  Sporadic eating is the opposite of what you need, and adjusting to a regular eating schedule such as this will not only be a great benefit to your body, but your medications will also tend to work better at keeping your diabetes under control. Tip 4. There is no best diet when it comes to weight loss.  When comparing diets and outcomes, the main ingredient when searching for weight loss was calories ! Lower calories = better weight loss. Pick a healthy diet thats right for you, i.e. diabetes friendly, and evaluate your daily calorie intake. And of course this would be of no use without exercise. Calories in need calories out. Introducing [facility]! Mary Cody introduces [employer] patient portal. Now you can access parts of your medical record, email your doctor's office, and request medication refills online. 1. In your internet browser, go to [email or web address] 2. Click on the First Time User? Click Here link in the Sign In box. You will see the New Member Sign Up page. 3. Enter your [employer] Access Code exactly as it appears below. You will not need to use this code after youve completed the sign-up process. If you do not sign up before the expiration date, you must request a new code. · [employer] Access Code: [email or web address] Expires: 10/9/2017 10:18 AM 
 
4. Enter the last four digits of your Social Security Number (xxxx) and Date of Birth (mm/dd/yyyy) as indicated and click Submit. You will be taken to the next sign-up page. 5. Create a [employer] ID. This will be your [employer] login ID and cannot be changed, so think of one that is secure and easy to remember. 6. Create a [employer] password. You can change your password at any time. 7. Enter your Password Reset Question and Answer. This can be used at a later time if you forget your password. 8. Enter your e-mail address. You will receive e-mail notification when new information is available in [address] 9. Click Sign Up. You can now view and download portions of your medical record. 10. Click the Download Summary menu link to download a portable copy of your medical information.  
 
If you have questions, please visit the Frequently Asked Questions section of the [employer]. Remember, [employer] is NOT to be used for urgent needs. For medical emergencies, dial 911. Now available from your iPhone and Android! Please provide this summary of care documentation to your next provider. Your primary care clinician is listed as Alvin Earl. If you have any questions after today's visit, please call [phone].

## 2017-07-12 ENCOUNTER — TELEPHONE (OUTPATIENT)
Dept: ENDOCRINOLOGY | Age: 37
End: 2017-07-12

## 2017-07-12 NOTE — TELEPHONE ENCOUNTER
I called patient to check in and make sure she was not drinking any more alcoholic beverages and avoiding sodas. She reports she is trying to avoid them. Also she notes that her blood sugars were 300's at her OBGYN clinic. She is to increase her insulin as we discussed, avoid soda and alcohol beverages, and she has increased her lantus to 60 units which is acceptable. Juan F Arita.  [address] [facility] Endocrinology  [address] [place] [address]

## 2017-07-18 ENCOUNTER — HOSPITAL ENCOUNTER (INPATIENT)
Age: 37
LOS: 6 days | Discharge: HOME | DRG: 566 | End: 2017-07-24
Attending: OBSTETRICS & GYNECOLOGY | Admitting: OBSTETRICS & GYNECOLOGY
Payer: MEDICAID

## 2017-07-18 ENCOUNTER — HOSPITAL ENCOUNTER (OUTPATIENT)
Dept: PERINATAL CARE | Age: 37
Discharge: HOME | End: 2017-07-18
Attending: OBSTETRICS & GYNECOLOGY
Payer: MEDICAID

## 2017-07-18 LAB
ALBUMIN SERPL BCP-MCNC: 2.5 G/DL (ref 3.5–5)
ALBUMIN/GLOB SERPL: 0.6 {RATIO} (ref 1.1–2.2)
ALP SERPL-CCNC: 52 U/L (ref 45–117)
ALT SERPL-CCNC: 8 U/L (ref 12–78)
ANION GAP BLD CALC-SCNC: 5 MMOL/L (ref 5–15)
AST SERPL W P-5'-P-CCNC: 12 U/L (ref 15–37)
BILIRUB SERPL-MCNC: 0.3 MG/DL (ref 0.2–1)
BUN SERPL-MCNC: 6 MG/DL (ref 6–20)
BUN/CREAT SERPL: 16 (ref 12–20)
CALCIUM SERPL-MCNC: 8.4 MG/DL (ref 8.5–10.1)
CHLORIDE SERPL-SCNC: 102 MMOL/L (ref 97–108)
CO2 SERPL-SCNC: 28 MMOL/L (ref 21–32)
CREAT SERPL-MCNC: 0.37 MG/DL (ref 0.55–1.02)
ERYTHROCYTE [DISTWIDTH] IN BLOOD BY AUTOMATED COUNT: 14.9 % (ref 11.5–14.5)
GLOBULIN SER CALC-MCNC: 4.4 G/DL (ref 2–4)
GLUCOSE BLD STRIP.AUTO-MCNC: 133 MG/DL (ref 65–100)
GLUCOSE BLD STRIP.AUTO-MCNC: 193 MG/DL (ref 65–100)
GLUCOSE BLD STRIP.AUTO-MCNC: 215 MG/DL (ref 65–100)
GLUCOSE BLD STRIP.AUTO-MCNC: 90 MG/DL (ref 65–100)
GLUCOSE SERPL-MCNC: 124 MG/DL (ref 65–100)
HCT VFR BLD AUTO: 37.2 % (ref 35–47)
HGB BLD-MCNC: 12 G/DL (ref 11.5–16)
MCH RBC QN AUTO: 26.4 PG (ref 26–34)
MCHC RBC AUTO-ENTMCNC: 32.3 G/DL (ref 30–36.5)
MCV RBC AUTO: 81.9 FL (ref 80–99)
PLATELET # BLD AUTO: 159 K/UL (ref 150–400)
POTASSIUM SERPL-SCNC: 4 MMOL/L (ref 3.5–5.1)
PROT SERPL-MCNC: 6.9 G/DL (ref 6.4–8.2)
RBC # BLD AUTO: 4.54 M/UL (ref 3.8–5.2)
SERVICE CMNT-IMP: ABNORMAL
SERVICE CMNT-IMP: NORMAL
SODIUM SERPL-SCNC: 135 MMOL/L (ref 136–145)
WBC # BLD AUTO: 10.3 K/UL (ref 3.6–11)

## 2017-07-18 PROCEDURE — 85027 COMPLETE CBC AUTOMATED: CPT | Performed by: OBSTETRICS & GYNECOLOGY

## 2017-07-18 PROCEDURE — 76816 OB US FOLLOW-UP PER FETUS: CPT | Performed by: OBSTETRICS & GYNECOLOGY

## 2017-07-18 PROCEDURE — 74011636637 HC RX REV CODE- 636/637: Performed by: HOSPITALIST

## 2017-07-18 PROCEDURE — 65410000002 HC RM PRIVATE OB

## 2017-07-18 PROCEDURE — 80053 COMPREHEN METABOLIC PANEL: CPT | Performed by: OBSTETRICS & GYNECOLOGY

## 2017-07-18 PROCEDURE — 82962 GLUCOSE BLOOD TEST: CPT

## 2017-07-18 PROCEDURE — 74011250637 HC RX REV CODE- 250/637: Performed by: OBSTETRICS & GYNECOLOGY

## 2017-07-18 PROCEDURE — 36415 COLL VENOUS BLD VENIPUNCTURE: CPT | Performed by: OBSTETRICS & GYNECOLOGY

## 2017-07-18 RX ORDER — DIPHENHYDRAMINE HCL 25 MG
25 CAPSULE ORAL
Status: DISCONTINUED | OUTPATIENT
Start: 2017-07-18 | End: 2017-07-24 | Stop reason: HOSPADM

## 2017-07-18 RX ORDER — FAMOTIDINE 20 MG/1
20 TABLET, FILM COATED ORAL
Status: DISCONTINUED | OUTPATIENT
Start: 2017-07-18 | End: 2017-07-24 | Stop reason: HOSPADM

## 2017-07-18 RX ORDER — INSULIN LISPRO 100 [IU]/ML
90 INJECTION, SOLUTION INTRAVENOUS; SUBCUTANEOUS ONCE
Status: DISCONTINUED | OUTPATIENT
Start: 2017-07-18 | End: 2017-07-18

## 2017-07-18 RX ORDER — SWAB
1 SWAB, NON-MEDICATED MISCELLANEOUS DAILY
Status: DISCONTINUED | OUTPATIENT
Start: 2017-07-19 | End: 2017-07-24 | Stop reason: HOSPADM

## 2017-07-18 RX ORDER — GUAIFENESIN 100 MG/5ML
81 LIQUID (ML) ORAL DAILY
COMMUNITY
End: 2017-07-24

## 2017-07-18 RX ORDER — ACETAMINOPHEN 325 MG/1
650 TABLET ORAL
COMMUNITY
End: 2018-12-30

## 2017-07-18 RX ORDER — INSULIN GLARGINE 100 [IU]/ML
60 INJECTION, SOLUTION SUBCUTANEOUS
Status: DISCONTINUED | OUTPATIENT
Start: 2017-07-18 | End: 2017-07-18 | Stop reason: SDUPTHER

## 2017-07-18 RX ORDER — INSULIN LISPRO 100 [IU]/ML
INJECTION, SOLUTION INTRAVENOUS; SUBCUTANEOUS
Status: DISCONTINUED | OUTPATIENT
Start: 2017-07-18 | End: 2017-07-20

## 2017-07-18 RX ORDER — SODIUM CHLORIDE 0.9 % (FLUSH) 0.9 %
5-10 SYRINGE (ML) INJECTION AS NEEDED
Status: DISCONTINUED | OUTPATIENT
Start: 2017-07-18 | End: 2017-07-24 | Stop reason: HOSPADM

## 2017-07-18 RX ORDER — MAGNESIUM SULFATE 100 %
4 CRYSTALS MISCELLANEOUS AS NEEDED
Status: DISCONTINUED | OUTPATIENT
Start: 2017-07-18 | End: 2017-07-24 | Stop reason: HOSPADM

## 2017-07-18 RX ORDER — INSULIN GLARGINE 100 [IU]/ML
30 INJECTION, SOLUTION SUBCUTANEOUS
Status: DISCONTINUED | OUTPATIENT
Start: 2017-07-18 | End: 2017-07-21

## 2017-07-18 RX ORDER — ACETAMINOPHEN 325 MG/1
650 TABLET ORAL
Status: DISCONTINUED | OUTPATIENT
Start: 2017-07-18 | End: 2017-07-24 | Stop reason: HOSPADM

## 2017-07-18 RX ORDER — DEXTROSE 50 % IN WATER (D50W) INTRAVENOUS SYRINGE
12.5-25 AS NEEDED
Status: DISCONTINUED | OUTPATIENT
Start: 2017-07-18 | End: 2017-07-18 | Stop reason: SDUPTHER

## 2017-07-18 RX ORDER — INSULIN LISPRO 100 [IU]/ML
50 INJECTION, SOLUTION INTRAVENOUS; SUBCUTANEOUS
Status: DISCONTINUED | OUTPATIENT
Start: 2017-07-19 | End: 2017-07-19

## 2017-07-18 RX ORDER — ZOLPIDEM TARTRATE 5 MG/1
5 TABLET ORAL
Status: DISCONTINUED | OUTPATIENT
Start: 2017-07-18 | End: 2017-07-24 | Stop reason: HOSPADM

## 2017-07-18 RX ORDER — SODIUM CHLORIDE 0.9 % (FLUSH) 0.9 %
5-10 SYRINGE (ML) INJECTION EVERY 8 HOURS
Status: DISCONTINUED | OUTPATIENT
Start: 2017-07-18 | End: 2017-07-23

## 2017-07-18 RX ADMIN — Medication 10 ML: at 22:26

## 2017-07-18 RX ADMIN — ACETAMINOPHEN 650 MG: 325 TABLET, FILM COATED ORAL at 15:49

## 2017-07-18 RX ADMIN — INSULIN GLARGINE 30 UNITS: 100 INJECTION, SOLUTION SUBCUTANEOUS at 22:23

## 2017-07-18 RX ADMIN — ACETAMINOPHEN 650 MG: 325 TABLET, FILM COATED ORAL at 20:48

## 2017-07-18 RX ADMIN — INSULIN LISPRO 2 UNITS: 100 INJECTION, SOLUTION INTRAVENOUS; SUBCUTANEOUS at 22:23

## 2017-07-18 NOTE — CONSULTS
BERT - Please see the Ultrasound report / consult note to be entered into this patient's record as a scanned document from my office. A text copy of this note is also provided below for convenience. Ella Martinez M.D., Ph.D.  [facility]    ---------------------------------------------------    Indication: Class B diabetes, poorly controlled. ____________________________________________________________________________  History: Age: 31 years. : 4 Para: 2. Previous pregnancies: Children born at term: 1. Children born : 1. Abortions: 1. Living children: 2. Current Pregnancy: Blood group: A Rhesus D-positive. Pre- pregnancy data: Weight 167 lbs. Height 5 ft 1 ins. BMI 31.6. Obstetric History: Mode of last delivery:  Section x 2. Details on previous pregnancies: Living children <37W: 1. Living children >=37W: 1. Intrauterine deaths < 14W: 1.  ____________________________________________________________________________  Dating:  LMP: 01/10/17 EDC: 10/17/17 GA by LMP: 27w0d  Best Overall Assessment: 17 EDC: 10/17/17 Assessed GA: 27w0d  The Best Overall Assessment is based on the LMP.  ____________________________________________________________________________  General Evaluation:  Fetal heart activity: present. Fetal heart rate: 142 bpm.   Presentation: transverse, Extremities directed toward cervix. Fetal movement: visible. Amniotic Fluid: normal. ZAYRA  19.5 cm. Maximal vertical pocket 5.9 cm. Placenta: posterior. ____________________________________________________________________________  Anatomy Scan:  Sandoval gestation.   Biometry:  BPD 65.3 mm 19th% 26w3d (25w4d to 27w1d)  .3 mm 35th% 27w3d (25w2d to 29w3d)  .1 mm 80th% 28w2d (27w4d to 29w0d)  FL 51.8 mm 55th% 27w5d (25w4d to 29w5d)  OFD 88.3 mm 41st% 26w5d  EFW (lbs/oz) 2 lbs 8 ozs  EFW (g) 1138 g  65th%       Fetal Anatomy:  Visualized with normal appearance: head, gastrointestinal tract, kidneys. Summary of Ultrasound Findings:  Transabdominal US. U/S machine: iAmplify E8 Expert. U/S view: limited by adiposity. Impression: The evaluated fetal anatomy appears normal.    ____________________________________________________________________________  Fetal Wellbeing Assessment:  Amniotic fluid: normal. ZAYRA: 19.5 cm. MVP: 5.9 cm. Q1: 5.0 cm. Q2: 3.4 cm. Q3: 5.2 cm. Q4: 5.9 cm.     ____________________________________________________________________________  Consultation:  Type of Consultation: Time-based patient evaluation:  45 minutes  Consultant: Dr. Loretta Escudero spent more than half of this 45-minute visit at your request providing direct face-to-face counseling for this patient regarding her poorly controlled diabetes. Ms. Rueben Ormond is familiar to our practice. Her has poorly controlled diabetes and is followed by Dr. Veronica Wilson. After Ms. Mccann leaves my office I discussed with Dr. Veronica Wilson. Dr. Veronica Wilson indicates that the patient shared that she drinks sugary beverages and some fruit-based alcoholic beverages, also. I did not know this when I met with Ms. Rueben Ormond and her family today, so my discussion did not include her beverage discussion. She provides a hand-written set of glucose values (glucose log) from the previous 6 days:   -- Fastin, 180, 183, 212, 201, 190 mg/dL   -- Pre-lunch:  220, 109, 162, 190, 197, 189 mg/dL   -- Post-lunch(2-hr):  150, 130, 201, 215, 134, 178 mg/dL   -- Before sleep  176, 121, 200 mg/dL (n=3)     She is currently taking Lantus (Levemir?) 60 units qhs plus Humalog 80 units with meals; if premeal > 200 mg/dL then taking 100 units. She indictes that she feels sweaty and with headache after taking her insulin. She sometimes rechecks her glycemia and it is always elevated. Target glycemic ranges to her:  Fasting < 90-95 mg/dL; 1-hr post-prandial < 130 mg/dL; 2-hr post-prandial < 120 mg/dL.     I reviewed that the severity of her hyperglycemia places her and the fetus at risk. Fetal risks include stillbirth; there is no evidence of fetal overgrowth or polyhydramnios currently. Maternal risks of severe hyperglycemia include ketoacidosis, seizure, heart attack, and even risk of death and/or neurologic injury. She is aware that there are other risks that we did not discuss today. I dicussed possibilities of a broken meter, fouled test strips, and denatured insulin. She assures that all are working properly and that her insulin is alway room temperature or colder. Assessment:  1. IUP 27w0d  2. Poorly controlled Class B diabetes  3. Hx  delivery x 2    Recommendations/Plan:  1. Admit to [facility] (antepartum) for glucose stabilizer (Gluocommander, etc.) protocol to establish insulin needs. Medicine hospitalist service may helpful for this. When insulin needs are understood, convert to reasonable outpatient dosing (combination of long and short-acting insulin) and provide an brief inpatient trial of home insulin course. 2.  Diabetes education information while inpatient. We will need to address Dr. Rosa Beal' report of her consuming sugary beverages at home. 3.  Ms. Wang Nelson will bring her in her meter and strips. 4.  Ms. Wang Nelson agrees to be hospitalized at least through 17.  5.  MFM fetal follow-up evaluation in 4 weeks. Start  surveillance at 32 weeks until delivery. Thank you, very much, for this opportunity to provide consultation for your patient. If you have any questions or concerns, please do not hesitate to contact our office at your convenience.  ____________________________________________________________________________  Maternal Structures:  Cervix: normal. Cervical length: 50 mm.  ____________________________________________________________________________  Report Summary:  Impression: A follow-up study was performed for fetal growth. Ms. Wang Nelson has poorly controlled Class B diabetes.   She is followed by Dr. José Liao [facility], [facility] endocrinology). Please see the note above. She reports good fetal activity and has no complaints at this time. Single viable IUP with composite biometry consistent with dates is observed. EFW is appropriate for gestational age. Fetal anatomy was not reviewed in detail, but appears normal as indicated above. Normal fetal movements and amniotic fluid measurements are noted. Recommendations: 1. Recommend fetal growth assessment in 4 weeks. 2.  Recommend weekly BPPs starting at 32 weeks until delivery. 3.  Please see the above consultation note for additional comments and recommendations. ____________________________________________________________________________  Fetal Growth Overview:  Date GA BPD [mm] HC [mm] AC [mm] FL [mm] HUM [mm] EFW GP  04/07/17 12 + 3 ... ... ... ... ... ... . ..  05/05/17 16 + 3 30.8 15th 119.9 15th 97.9 31st 18.2 11th . .. 131g , 0 lbs 5 ozs n/a%  06/01/17 20 + 2 44.7 19th 168.3 11th 154.7 56th 33.3 47th 27.3 12th 352g , 0 lbs 12 ozs n/a%  06/27/17 24 + 0 54.0 <5th 211.7 10th 198.5 58th 43.1 41st ... 663g , 1 lbs 7 ozs 48th%%  07/18/17 27 + 0 65.3 19th 252.3 35th 240.1 80th 51.8 55th . .. 1138g , 2 lbs 8 ozs 65th%%    Rizwan Sam M.D., Ph.D.  Drea Nunez

## 2017-07-18 NOTE — IP AVS SNAPSHOT
2700 [facility] [address] 
[phone] Patient: Vance Palomino MRN: [id number] :1980 You are allergic to the following No active allergies Immunizations Administered for This Admission Name Date Tdap 2017 Recent Documentation Height Weight Breastfeeding? BMI OB Status Smoking Status 1.549 m 86.3 kg No 35.95 kg/m2 Pregnant Never Smoker Unresulted Labs Order Current Status SAMPLE TO BLOOD BANK In process Emergency Contacts Name Discharge Info Relation Home Work Mobile Edil Jacobson DISCHARGE CAREGIVER [3] Spouse [3] [phone] About your hospitalization You were admitted on:  2017 You last received care in the:  [address] [place] [address] Road ANTEPARTUM/MI You were discharged on:  2017 Unit phone number:  [phone] Why you were hospitalized Your primary diagnosis was:  Uncontrolled Diabetes Mellitus (Hcc) Your diagnoses also included:  Diabetes In Pregnancy Providers Seen During Your Hospitalizations Provider Role Specialty Primary office phone Anne White DO Attending Provider Obstetrics & Gynecology [phone] Your Primary Care Physician (PCP) Primary Care Physician Office Phone Office Fax Clara Stafford [phone] Follow-up Information Follow up With Details Comments Contact Info Adelaida Ortiz7 [facility] [address] 1400 [address] [place] [address] 
[phone] [facility] [employer] in 1 day to provide prenatal support post discharge. Please call if you have not heard from [facility] by .  [phone] Department of   Call Please call to inquire about additional assistance in the home for your child. [phone]  [address] Call Please call to inquire about additional support in home for your child. [address] [facility] [place] [address] 
[phone] Parminder Mathew,  Call Please call your child's pediatrician to identify additional support/resources for home needs. [address] [facility] [address] [employer] [address] [facility] [address] 
[phone] Your Appointments 2017  9:50 AM EDT Follow Up with MD Merari Smith Diabetes and Endocrinology [facility]) [facility] [address] [phone] 2017 10:15 AM EDT FOLLOW-UP OB ULTRASOUND with ULTRASOUND 1 [facility] ([address]) [address] [place] [address]  
[phone] Tuesday August 15, 2017  9:50 AM EDT Follow Up with MD Merari Smith Diabetes and Endocrinology [facility]) [facility] [address] [address] [phone] 2017 10:10 AM EDT Follow Up with MD Merari Smith Diabetes and Endocrinology [facility]) [employer] [facility] [employer] [address] [address] [phone] Current Discharge Medication List  
  
START taking these medications Dose & Instructions Dispensing Information Comments Morning Noon Evening Bedtime  
 famotidine 20 mg tablet Commonly known as:  PEPCID Your last dose was: Your next dose is:    
   
   
 Dose:  20 mg Take 1 Tab by mouth every twelve (12) hours as needed (Indigestion). Quantity:  20 Tab Refills:  0 CONTINUE these medications which have CHANGED Dose & Instructions Dispensing Information Comments Morning Noon Evening Bedtime  
 insulin glargine 100 unit/mL (3 mL) Inpn Commonly known as:  Job Beck What changed:   
- how much to take - how to take this - when to take this 
- additional instructions Your last dose was: Your next dose is:    
   
   
 Up to 65 units subcutaneously at each bedtime Quantity:  15 Pen Refills:  3  
     
   
   
   
  
 insulin lispro 100 unit/mL kwikpen Commonly known as:  HUMALOG What changed:   
- how much to take 
- how to take this - when to take this 
- additional instructions Your last dose was: Your next dose is:    
   
   
 Dose:  60 Units 60 Units by SubCUTAneous route Before breakfast, lunch, and dinner. Indications: Diabetes in pregnancy, uncontrolled Quantity:  6 Package Refills:  3 Using 70-90 units per meal in pregnancy. Please assure minimum of a month supply. CONTINUE these medications which have NOT CHANGED Dose & Instructions Dispensing Information Comments Morning Noon Evening Bedtime  
 glucose blood VI test strips strip Commonly known as:  Remy Agustin Your last dose was: Your next dose is:    
   
   
 Use to test blood glucose 5x daily Quantity:  100 Strip Refills:  11  
     
   
   
   
  
 PNV with Ca,No.71-Iron-FA 27-1 mg Tab Your last dose was: Your next dose is: Take  by mouth. Refills:  0  
     
   
   
   
  
 TYLENOL 325 mg tablet Generic drug:  acetaminophen Your last dose was: Your next dose is:    
   
   
 Dose:  650 mg Take 650 mg by mouth every four (4) hours as needed for Pain. Refills:  0 STOP taking these medications   
 aspirin 81 mg chewable tablet Where to Get Your Medications Information on where to get these meds will be given to you by the nurse or doctor. ! Ask your nurse or doctor about these medications  
  famotidine 20 mg tablet  
 insulin glargine 100 unit/mL (3 mL) Inpn  
 insulin lispro 100 unit/mL kwikpen Discharge Instructions Discharge Instructions PATIENT ID: Ryder Kitchen MRN: [id number] YOB: 1980 DATE OF ADMISSION: 7/18/2017 12:55 PM   
DATE OF DISCHARGE: 7/24/2017 PRIMARY CARE PROVIDER: Yony Martinez DO  
 
ATTENDING PHYSICIAN: Yony Martinez DO 
DISCHARGING PROVIDER: Lux Singleton MD   
To contact this individual call [phone] and ask the  to page. If unavailable ask to be transferred the Adult Hospitalist Department. DISCHARGE DIAGNOSES  
DM-II in the setting of IUP 27 weeks 6 days CONSULTATIONS: IP CONSULT TO HOSPITALIST 
 
PROCEDURES/SURGERIES: * No surgery found * PENDING TEST RESULTS:  
At the time of discharge the following test results are still pending: none FOLLOW UP APPOINTMENTS:  
1.Penny Ingram DO, Obstetrics & Gynecology in one week 2. Shashank Kaminski MD, Endocrinologist in one week 3. Call and make appointment to see a Primary Care Physician in 2 weeks ADDITIONAL CARE RECOMMENDATIONS: 
 
DIET: Diabetic Diet ACTIVITY: Activity as tolerated WOUND CARE: none EQUIPMENT needed: none DISCHARGE MEDICATIONS: 
 See Medication Reconciliation Form · It is important that you take the medication exactly as they are prescribed. · Keep your medication in the bottles provided by the pharmacist and keep a list of the medication names, dosages, and times to be taken in your wallet. · Do not take other medications without consulting your doctor. NOTIFY YOUR PHYSICIAN FOR ANY OF THE FOLLOWING:  
Fever over 101 degrees for 24 hours. Chest pain, shortness of breath, fever, chills, nausea, vomiting, diarrhea, change in mentation, falling, weakness, bleeding. Severe pain or pain not relieved by medications. Or, any other signs or symptoms that you may have questions about. DISPOSITION: 
x  Home With: 
 OT  PT  HH x RN  
  
 SNF/Inpatient Rehab/LTAC Independent/assisted living Hospice Other: CDMP Checked:  
Yes x PROBLEM LIST Updated: 
Yes x Signed:  
Sandie Rogers MD 
7/24/2017 
9:00 AM 
 
Discharge Instructions Attachments/References PREGNANCY: WEEKS 26 TO 30 (ENGLISH) Discharge Orders None [employer] Announcement We are excited to announce that we are making your provider's discharge notes available to you in [employer]. You will see these notes when they are completed and signed by the physician that discharged you from your recent hospital stay. If you have any questions or concerns about any information you see in [employer], please call the Health Information Department where you were seen or reach out to your Primary Care Provider for more information about your plan of care. Introducing [facility]! Katherine Arshad introduces [employer] patient portal. Now you can access parts of your medical record, email your doctor's office, and request medication refills online. 1. In your internet browser, go to [email or web address] 2. Click on the First Time User? Click Here link in the Sign In box. You will see the New Member Sign Up page. 3. Enter your [employer] Access Code exactly as it appears below. You will not need to use this code after youve completed the sign-up process. If you do not sign up before the expiration date, you must request a new code. · [employer] Access Code: [email or web address] Expires: 10/9/2017 10:18 AM 
 
4. Enter the last four digits of your Social Security Number (xxxx) and Date of Birth (mm/dd/yyyy) as indicated and click Submit. You will be taken to the next sign-up page. 5. Create a [employer] ID. This will be your [employer] login ID and cannot be changed, so think of one that is secure and easy to remember. 6. Create a [employer] password. You can change your password at any time. 7. Enter your Password Reset Question and Answer. This can be used at a later time if you forget your password. 8. Enter your e-mail address. You will receive e-mail notification when new information is available in [address] 9. Click Sign Up. You can now view and download portions of your medical record. 10. Click the Download Summary menu link to download a portable copy of your medical information. If you have questions, please visit the Frequently Asked Questions section of the [employer] website. Remember, [employer] is NOT to be used for urgent needs. For medical emergencies, dial 911. Now available from your iPhone and Android! General Information Please provide this summary of care documentation to your next provider. Patient Signature:  ____________________________________________________________ Date:  ____________________________________________________________  
  
Mami Farris Provider Signature:  ____________________________________________________________ Date:  ____________________________________________________________ More Information Weeks 26 to 30 of Your Pregnancy: Care Instructions Your Care Instructions You are now in your last trimester of pregnancy. Your baby is growing rapidly. And you'll probably feel your baby moving around more often. Your doctor may ask you to count your baby's kicks. Your back may ache as your body gets used to your baby's size and length. If you haven't already had the Tdap shot during this pregnancy, talk to your doctor about getting it. It will help protect your  against pertussis infection. During this time, it's important to take care of yourself and pay attention to what your body needs. If you feel sexual, explore ways to be close with your partner that match your comfort and desire. Use the tips provided in this care sheet to find ways to be sexual in your own way. Follow-up care is a key part of your treatment and safety.  Be sure to make and go to all appointments, and call your doctor if you are having problems. It's also a good idea to know your test results and keep a list of the medicines you take. How can you care for yourself at home? Take it easy at work · Take frequent breaks. If possible, stop working when you are tired, and rest during your lunch hour. · Take bathroom breaks every 2 hours. · Change positions often. If you sit for long periods, stand up and walk around. · When you stand for a long time, keep one foot on a low stool with your knee bent. After standing a lot, sit with your feet up. · Avoid fumes, chemicals, and tobacco smoke. Be sexual in your own way · Having sex during pregnancy is okay, unless your doctor tells you not to. · You may be very interested in sex, or you may have no interest at all. · Your growing belly can make it hard to find a good position during intercourse. [place] and explore. · You may get cramps in your uterus when your partner touches your breasts. · A back rub may relieve the backache or cramps that sometimes follow orgasm. Learn about  labor · Watch for signs of  labor. You may be going into labor if: 
¨ You have menstrual-like cramps, with or without nausea. ¨ You have about 4 or more contractions in 20 minutes, or about 8 or more within 1 hour, even after you have had a glass of water and are resting. ¨ You have a low, dull backache that does not go away when you change your position. ¨ You have pain or pressure in your pelvis that comes and goes in a pattern. ¨ You have intestinal cramping or flu-like symptoms, with or without diarrhea. ¨ You notice an increase or change in your vaginal discharge. Discharge may be heavy, mucus-like, watery, or streaked with blood. ¨ Your water breaks. · If you think you have  labor: ¨ Drink 2 or 3 glasses of water or juice. Not drinking enough fluids can cause contractions. ¨ Stop what you are doing, and empty your bladder. Then lie down on your left side for at least 1 hour. ¨ While lying on your side, find your breast bone. Put your fingers in the soft spot just below it. Move your fingers down toward your belly button to find the top of your uterus. Check to see if it is tight. ¨ Contractions can be weak or strong. Record your contractions for an hour. Time a contraction from the start of one contraction to the start of the next one. ¨ Single or several strong contractions without a pattern are called Yuniel-Ceballos contractions. They are practice contractions but not the start of labor. They often stop if you change what you are doing. ¨ Call your doctor if you have regular contractions. Where can you learn more? Go to http://tayler-eduardo.info/. Enter O678 in the search box to learn more about \"Weeks 26 to 30 of Your Pregnancy: Care Instructions. \" Current as of: March 16, 2017 Content Version: 11.3 © 7033-9485 [employer]. Care instructions adapted under license by [employer] (which disclaims liability or warranty for this information). If you have questions about a medical condition or this instruction, always ask your healthcare professional. [place] [address] any warranty or liability for your use of this information.

## 2017-07-18 NOTE — PROGRESS NOTES
Endocrine Chart Review:    Received call today from Dr. Priscila Marte who notified me of patients planned admission. We discussed all of the recent concerns with her diabetes self-management. I have been following her regularly since she first learned that she was pregnant and we had quickly increased her insulin since that time. Prior to her pregnancy she had a love for sodas. We had discussed this and with cutting back she did so much better. As of her most recent visit with me, I questioned her poor response to the treatment and with questioning, she admitted to having been drinking up to 4 regular sodas each day, among other things. Please see my most recent progress note. Regimen as of last visit:   Lantus 50units at bedtime only  Humalog 90 units each meal (notably she did not like going up on this due to assoc weight gain etc, but has been necessary)  She has historically needed much more prandial insulin than basal insulin, and this is due to high daytime sugar consumption.     Correction Scale  1:15>150     IF GLUCOSE IS:                 THEN TAKE:                                                                               0   Extra Unit  150-165                                                                 1   Extra Unit  165-180                                                                 2   Extra Units  180-195                                                                 3   Extra Units  195-210                                                                 4   Extra Units  210-225                                                                 5   Extra Units  225-240                                                                 6   Extra Units  240-255                                                                 7   Extra Units  255-270                                                                 8   Extra Units  384-477 9   Extra Units  285-300                                                                 10 Extra Units    I am advised patient will be admitted to get her blood sugars under better control, which is very reasonable considering her pregnancy. Note however that her issue is chronic and related to her lifestyle. As she is not consuming the same foods and drinking the same beverages in the hospital, be aware that her inpatient insulin needs will not reflect her outpatient insulin needs. If her requirements while in the hospital are much less, there is a high probability that she will go home under-dosed. To avoid this, either she will need to be certain she is eating and drinking closely to the way she is during admission, or she would otherwise need no less that the regimen described above. Thank you for notifying me of her planned admission. Inpatient team is welcome to contact me with concerns. I am more easily reached through [employer] messaging and can respond quicker this way as well. After hours, if need, one of my colleagues is covering and would be happy to help with any concerns. Rosenda Pradhan.  [address] Endocrinology  [address] [place] [address]

## 2017-07-18 NOTE — IP AVS SNAPSHOT
[address] 
[phone] Patient: Ellen Retana MRN: [id number] :1980 Current Discharge Medication List  
  
START taking these medications Dose & Instructions Dispensing Information Comments Morning Noon Evening Bedtime  
 famotidine 20 mg tablet Commonly known as:  PEPCID Your last dose was: Your next dose is:    
   
   
 Dose:  20 mg Take 1 Tab by mouth every twelve (12) hours as needed (Indigestion). Quantity:  20 Tab Refills:  0 CONTINUE these medications which have CHANGED Dose & Instructions Dispensing Information Comments Morning Noon Evening Bedtime  
 insulin glargine 100 unit/mL (3 mL) Inpn Commonly known as:  [place]ower What changed:   
- how much to take 
- how to take this - when to take this 
- additional instructions Your last dose was: Your next dose is:    
   
   
 Up to 65 units subcutaneously at each bedtime Quantity:  15 Pen Refills:  3  
     
   
   
   
  
 insulin lispro 100 unit/mL kwikpen Commonly known as:  HUMALOG What changed:   
- how much to take 
- how to take this - when to take this 
- additional instructions Your last dose was: Your next dose is:    
   
   
 Dose:  60 Units 60 Units by SubCUTAneous route Before breakfast, lunch, and dinner. Indications: Diabetes in pregnancy, uncontrolled Quantity:  6 Package Refills:  3 Using 70-90 units per meal in pregnancy. Please assure minimum of a month supply. CONTINUE these medications which have NOT CHANGED Dose & Instructions Dispensing Information Comments Morning Noon Evening Bedtime  
 glucose blood VI test strips strip Commonly known as:  [place] June Your last dose was: Your next dose is:    
   
   
 Use to test blood glucose 5x daily Quantity:  100 Strip Refills:  11  
     
   
   
   
  
 PNV with Ca,No.71-Iron-FA 27-1 mg Tab Your last dose was: Your next dose is: Take  by mouth. Refills:  0  
     
   
   
   
  
 TYLENOL 325 mg tablet Generic drug:  acetaminophen Your last dose was: Your next dose is:    
   
   
 Dose:  650 mg Take 650 mg by mouth every four (4) hours as needed for Pain. Refills:  0 STOP taking these medications   
 aspirin 81 mg chewable tablet Where to Get Your Medications Information on where to get these meds will be given to you by the nurse or doctor. ! Ask your nurse or doctor about these medications  
  famotidine 20 mg tablet  
 insulin glargine 100 unit/mL (3 mL) Inpn  
 insulin lispro 100 unit/mL kwikpen

## 2017-07-18 NOTE — PROGRESS NOTES
Bedside and Verbal shift change report given to JOSE breen RN (oncoming nurse) by Pinky Arias RN (offgoing nurse). Report included the following information SBAR, Kardex, MAR and Recent Results. 1555 IV, saline lock started and labs (CBC,CMP, STBB) drawn and sent. Given tylenol for complaint of headache. 1630 Fetal monitoring completed, reassuring. No contractions noted or felt by patient. 1800 Dr Juan Casarez in to see patient.

## 2017-07-18 NOTE — H&P
Obstetrics Admission H&P    Chris Sethi  [id number]  1980    Chief Complaint:  Pregnancy and suboptimal control of type II DM     HPI: 31 [facility]  [facility] 27w0d with Estimated Date of Delivery: 10/17/17  Pregnancy has been complicated by diabetes - Type 2. Patient with no complaints. Sent to the hospital by MFM for optimization of BG management. . Patient denies abdominal pain  , contractions, headache , nausea and vomiting, pelvic pressure, shortness of breath, vaginal bleeding  and vaginal leaking of fluid . ROS:  A comprehensive review of systems was negative except for that written in the HPI. OB History      Para Term  AB Living    4 2 1 1 1 2    SAB TAB Ectopic Molar Multiple Live Births    1     2        Past Medical History:   Diagnosis Date    Diabetes mellitus ([facility] [address]     insulin    Hyperlipidemia     Obesity      Past Surgical History:   Procedure Laterality Date     DELIVERY ONLY          HX  SECTION  2011    breech     Social History     Social History    Marital status:      Spouse name: N/A    Number of children: N/A    Years of education: N/A     Occupational History    Not on file. Social History Main Topics    Smoking status: Never Smoker    Smokeless tobacco: Never Used    Alcohol use No    Drug use: No    Sexual activity: Yes     Partners: Male     Birth control/ protection: None     Other Topics Concern    Not on file     Social History Narrative     Family History   Problem Relation Age of Onset    Diabetes Mother     Diabetes Father     Diabetes Sister     Diabetes Brother      No Known Allergies  Prior to Admission Medications   Prescriptions Last Dose Informant Patient Reported? Taking? PNV with Ca,No.71-Iron-FA 27-1 mg tab 2017 at Unknown time  Yes Yes   Sig: Take  by mouth.    acetaminophen (TYLENOL) 325 mg tablet 2017 at Unknown time  Yes Yes   Sig: Take 650 mg by mouth every four (4) hours as needed for Pain. aspirin 81 mg chewable tablet 2017 at Unknown time  Yes Yes   Sig: Take 81 mg by mouth daily. glucose blood VI test strips (ONE TOUCH VERIO) strip   No No   Sig: Use to test blood glucose 5x daily   insulin glargine (LANTUS,BASAGLAR) 100 unit/mL (3 mL) inpn 2017 at  2300  No Yes   Sig: Up to 65 units subcutaneously at each bedtime   Patient taking differently: 60 Units by SubCUTAneous route nightly. Up to 65 units subcutaneously at each bedtime  Indications: type 1 diabetes mellitus   insulin lispro (HUMALOG) 100 unit/mL kwikpen   No No   Sig: Up to 90 units with each meal  Indications: Diabetes in pregnancy, uncontrolled   Patient taking differently:  Units by SubCUTAneous route Before breakfast, lunch, dinner and at bedtime. Up to 90 units with each meal  Indications: type 1 diabetes mellitus, Diabetes in pregnancy, uncontrolled      Facility-Administered Medications: None         Vitals:  Patient Vitals for the past 8 hrs:   BP Temp Pulse Resp Height Weight   17 1403 118/70 98.6 °F (37 °C) (!) 103 12 - -   17 1257 - - - - 5' 1\" (1.549 m) 86.3 kg (190 lb 4 oz)     Temp (24hrs), Av.6 °F (37 °C), Min:98.6 °F (37 °C), Max:98.6 °F (37 °C)    I&O:                    Exam:  Patient without distress.                Abdomen soft, non-tender               Fundus soft and non tender               Perineum No sign of blood or amniotic fluid               Lower extremities edema No                  Cervical Exam:  Deferred  Membranes:   Intact    Fetal Heart Rate: +FHTs  Uterine Activity: None         Labs:   Recent Results (from the past 24 hour(s))   GLUCOSE, POC    Collection Time: 17  3:07 PM   Result Value Ref Range    Glucose (POC) 90 65 - 100 mg/dL    Performed by Elo Belmont  MAICOL    CBC W/O DIFF    Collection Time: 17  4:01 PM   Result Value Ref Range    WBC 10.3 3.6 - 11.0 K/uL    RBC 4.54 3.80 - 5.20 M/uL    HGB 12.0 11.5 - 16.0 g/dL    HCT 37.2 35.0 - 47.0 %    MCV 81.9 80.0 - 99.0 FL    MCH 26.4 26.0 - 34.0 PG    MCHC 32.3 30.0 - 36.5 g/dL    RDW 14.9 (H) 11.5 - 14.5 %    PLATELET 036 435 - 751 K/uL       Assessment and Plan:      1. - Diabetes-Type 2 in the setting of pregnancy  - Endocrinologist is Dr. Shireen Benjamin. Pt with recent BGs in the 150-200s postprandial. Fastings 180-200s. - Current insulin dosing is Lantus 60 units QHS and Humalog 80 units with meals. If premeal is >200, then she is using 100 units.   - Plan is admission until BGs are stabilized. Glucose stabilizer planned. - IM Hospitalist already consulted. - Diabetes education.  - Due to high insulin requirements and frequent checks, pt may need stay on L&D.  - Fetal ultrasound completed today with MFM- all WNL. Anticipate FHTs qshift for fetal monitoring.  - Appreciate input on pt's care from Hospitalist, Endocrinologist, and MFM.

## 2017-07-18 NOTE — PROGRESS NOTES
1400: assisting with patient admission. Pt reports that she hasn't eaten today and is hungry. Per Erica Chávez, RN, awaiting Dr. Alf Malcolm return call after she speaks with hospitalist and per Dr. Priyanka Freeman pt should not eat yet, nor should we check blood sugar. 1420:  Called Dr. Priyanka Freeman. She is reaching out to medical hospitalist.  Per Dr. Priyanka Freeman pt can eat. Check BS prior to eating. Pt does not want labwork drawn until she eats. States she gets lightheaded. 1445 - DTC notified of pts admission and pending glucostabilizer order. Will assess after medical hospitalist starts Erendira Lopez. 1510: Dr. Tyrell Murray on unit to see patient. Pts fingerstick BS = 90 pre meal. Pt states that she has not eaten anything today, but did take her am Lantus and Humalog doses. Per Dr. Tyrell Murray, will hold humalog for now. Ok for pt to eat lunch tray of salad, baked chicken, rice and milk. 1620:  Called Dr. Priyanka Freeman to check on patient's plan of care. Per Dr. Priyanka Freeman, she spoke with Dr. Ramya Zuniga (hospitalist) who will be by to see patient and make insulin management plan. In the meanwhile, check Bs 2 hrs PP.

## 2017-07-18 NOTE — CONSULTS
[address] [facility] [address]   [address] [facility]       Name:  David Adkins   MR#:  [id number]   :  1980   Account #:  [de-identified]    Date of Consultation:  2017   Date of Adm:  2017       PRIMARY ATTENDING: Krystal Richter DO    CONSULTING PHYSICIAN: Jonny Hua MD    SOURCE OF THE HISTORY: The patient, the patient's primary care   physician, Dr. Krystal Richter, as well as notes by Dr. Freeman Ulrich, as well as   Dr. Belinda Ahmadi. REASON FOR CONSULT: Diabetes management. HISTORY OF PRESENT ILLNESS: This is a 74-year-old G4, P2, 32   weeks pregnant female with a past medical history of diabetes   mellitus type 2. She is being followed by Dr. Belinda Ahmadi for her diabetes   Last seen by him about a week ago and today, the patient was seen by Dr. Cecilio Curiel, who is the [facility]   OB/GYN for the patient. Both Dr. Belinda Ahmadi as well as Dr. Freeman Ulrich had   concerns about her uncontrolled blood sugars and that is why the   patient was sent to [facility] for blood sugar management. The patient checks her blood sugars 6 times daily and her usual blood   sugars are fasting between 180 to 240 and post meal, her blood   sugars are 180 to 220 and she takes Levemir 60 units every night   (which was increased about 1 week ago from 50 units). Also, she takes   Humalog 80 units with meals and checks her blood sugar after meals   and if it is more than 200, then she gives herself 20 more units. She   claims that occasionally she would have low blood sugars and she has   had low blood sugars as low as 45 (last one month she had atleast 5 hypoglycemic episodes). All her low blood sugars are early   in the morning at around 3 to 4 o'clock. She admits that she is missing   her meals, mostly the lunch but occasionally the dinner as well, because she does not have a good   appetite. She never takes a nighttime snack.      As far as her meals are concerned, she mentions that there is no particular pattern. Also, she drinks 20 ounces of Coke twice a day and   is also taking what it seems like fruit punch, which is nonalcoholic. Every 2 to 3 days, she is also drinking tea with regular sugar in it. No chest pain, shortness of breath, headache, dizziness, cough, fever,   nausea, vomiting, no changes in bowel movement. REVIEW OF SYSTEMS: Ten review of systems were done. They were   all negative, except for above. PAST MEDICAL HISTORY: Diabetes mellitus type 2. PAST SURGERIES:  section x2. HOME MEDICATIONS: The patient is on the following medications of:   1. Levemir 60 units at bedtime. 2. Humalog 80 to 100 units subcutaneously with meals. 3. Prenatal vitamins. FAMILY HISTORY: Not significant for coronary artery disease. PHYSICAL EXAMINATION   VITAL SIGNS: At the time that the patient was seen, the patient's vitals   are as follows: Blood pressure 118/70, pulse 103, afebrile, respiratory   rate 12, saturating 98% on room air. GENERAL: Not in acute distress, comfortably sitting on bed. HEAD: Normocephalic, atraumatic. EYES: PERRLA, EOMI. EARS: Bilateral hearing normal, no growth. NOSE: No polyps, no bleeding. MOUTH: Moist mucous membranes. Decent oral hygiene. NECK: Supple. No JVD, no thyromegaly. RESPIRATORY: Clear to auscultation bilaterally. No adventitious   breath sounds. CARDIAC: S1, S2 normal. No murmurs, rubs or gallops. GASTROINTESTINAL: Bowel sounds present, nontender,   nondistended. NEUROLOGIC: Cranial nerves II through XII intact. Motor 5/5 bilaterally,   upper limbs and lower limbs. Sensory normal.   PSYCHIATRIC: Mood and affect appropriate. Alert and oriented x3. LABORATORY AND RADIOLOGICAL RESULTS: WBC 10.3,   hemoglobin , hematocrit  and a platelet count of 952. Sodium   125, potassium 4.0, chloride  28, glucose 124, BUN 6, creatinine 0.3. Hemoglobin A1c that was done on 2017 was 7.5.      ASSESSMENT AND PLAN   This is a 72-year-old female with past medical history of diabetes   mellitus, slightly uncontrolled, and is G4, P2 with 27 weeks pregnancy. 1. Diabetes mellitus. She comes over here for control of her blood sugars. The patient's   recent hemoglobin A1c was 7.5. Her blood sugars fasting as well as   postprandial range from 180 to 240. Currently, I do not feel that the   patient needs to be on an insulin drip. We would continue the patient's   insulin regimen, but at a slightly lower dose. Based on her blood sugars,   we will adjust her insulin. I think most of her uncontrolled diabetes is   because of dietary indiscretion. She claims that she has never seen a   nutritionist. [facility]. I am   sure tomorrow they would be able to give better dietary regimen to her. I also counseled the patient to not miss any of her meals and also take   a nighttime snack and she agrees on that. 2. I have also told the patient that low blood sugars are very dangerous   for her and she should not be skipping any of her meals. 3. 27-week intrauterine pregnancy. Management as per primary team.     I spent about 55 minutes in taking care of the patient. We will follow the   patient along with you. Please call with questions.         MD Zander Noe / Mateo Sinclair   D:  07/18/2017   18:21   T:  07/18/2017   19:05   Job #:  [id number]

## 2017-07-19 LAB
GLUCOSE BLD STRIP.AUTO-MCNC: 157 MG/DL (ref 65–100)
GLUCOSE BLD STRIP.AUTO-MCNC: 168 MG/DL (ref 65–100)
GLUCOSE BLD STRIP.AUTO-MCNC: 189 MG/DL (ref 65–100)
GLUCOSE BLD STRIP.AUTO-MCNC: 221 MG/DL (ref 65–100)
SERVICE CMNT-IMP: ABNORMAL

## 2017-07-19 PROCEDURE — 74011250637 HC RX REV CODE- 250/637: Performed by: OBSTETRICS & GYNECOLOGY

## 2017-07-19 PROCEDURE — 74011636637 HC RX REV CODE- 636/637: Performed by: HOSPITALIST

## 2017-07-19 PROCEDURE — 77030033269 HC SLV COMPR SCD KNE2 CARD -B

## 2017-07-19 PROCEDURE — 82962 GLUCOSE BLOOD TEST: CPT

## 2017-07-19 PROCEDURE — 65410000002 HC RM PRIVATE OB

## 2017-07-19 RX ORDER — INSULIN LISPRO 100 [IU]/ML
52 INJECTION, SOLUTION INTRAVENOUS; SUBCUTANEOUS
Status: DISCONTINUED | OUTPATIENT
Start: 2017-07-19 | End: 2017-07-20

## 2017-07-19 RX ADMIN — ZOLPIDEM TARTRATE 5 MG: 5 TABLET ORAL at 22:37

## 2017-07-19 RX ADMIN — Medication 10 ML: at 22:26

## 2017-07-19 RX ADMIN — INSULIN GLARGINE 30 UNITS: 100 INJECTION, SOLUTION SUBCUTANEOUS at 22:25

## 2017-07-19 RX ADMIN — INSULIN LISPRO 2 UNITS: 100 INJECTION, SOLUTION INTRAVENOUS; SUBCUTANEOUS at 20:51

## 2017-07-19 RX ADMIN — Medication 10 ML: at 07:24

## 2017-07-19 RX ADMIN — INSULIN LISPRO 52 UNITS: 100 INJECTION, SOLUTION INTRAVENOUS; SUBCUTANEOUS at 11:35

## 2017-07-19 RX ADMIN — INSULIN LISPRO 3 UNITS: 100 INJECTION, SOLUTION INTRAVENOUS; SUBCUTANEOUS at 07:23

## 2017-07-19 RX ADMIN — FAMOTIDINE 20 MG: 20 TABLET ORAL at 22:37

## 2017-07-19 RX ADMIN — INSULIN LISPRO 2 UNITS: 100 INJECTION, SOLUTION INTRAVENOUS; SUBCUTANEOUS at 14:02

## 2017-07-19 RX ADMIN — INSULIN LISPRO 50 UNITS: 100 INJECTION, SOLUTION INTRAVENOUS; SUBCUTANEOUS at 07:24

## 2017-07-19 RX ADMIN — INSULIN LISPRO 52 UNITS: 100 INJECTION, SOLUTION INTRAVENOUS; SUBCUTANEOUS at 16:57

## 2017-07-19 RX ADMIN — INSULIN LISPRO 2 UNITS: 100 INJECTION, SOLUTION INTRAVENOUS; SUBCUTANEOUS at 10:09

## 2017-07-19 RX ADMIN — Medication 10 ML: at 14:02

## 2017-07-19 RX ADMIN — Medication 1 TABLET: at 10:14

## 2017-07-19 RX ADMIN — FAMOTIDINE 20 MG: 20 TABLET ORAL at 14:10
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
## 2017-07-19 NOTE — PROGRESS NOTES
1600 Bedside and Verbal shift change report given to PARIS Storey (oncoming nurse) by Lg Romero RN (offgoing nurse). Report included the following information SBAR.

## 2017-07-19 NOTE — PROGRESS NOTES
Bedside shift change report given to PARIS Webster RN (oncoming nurse) by Vielka Collins RN (offgoing nurse). Report included the following information SBAR, Kardex, MAR, Accordion and Recent Results.

## 2017-07-19 NOTE — PROGRESS NOTES
Bedside and Verbal shift change report given to JOSE Nieto RN (oncoming nurse) by Sebastian Mckeon RN (offgoing nurse). Report included the following information SBAR, Kardex, MAR and Recent Results. [id number] Hourly rounding done. 1300 Diabetic Treatment Team nurse to bedside. 1500 Instructed on use of SCD machine and need for DVT prophylaxis.

## 2017-07-19 NOTE — PROGRESS NOTES
High Risk Obstetrics Progress Note    Name: Yana Pate MRN: [id number]  SSN: [id number]    YOB: 1980  Age: 31 y.o. Sex: female      Subjective:      LOS: 1 day    Estimated Date of Delivery: 10/17/17   Gestational Age Today: 27w3d     Patient admitted for uncontrolled DM Type 2. She was seen and examined this morning, reports she did not sleep much overnight primarily due to being in the hospital. She denies HA, vision changes, CP, SOB, N/V, fever/chills or leg edema bilaterally. She admits to [employer] FM this am. No vaginal bleeding, LOF or contractions. Blood sugars elevated last night and this morning, will continue to monitor with recommendations from IM hospitalist for insulin management. Objective:     Vitals:  Blood pressure 128/78, pulse (!) 102, temperature 98.2 °F (36.8 °C), resp. rate 18, height 5' 1\" (1.549 m), weight 86.3 kg (190 lb 4 oz), last menstrual period 01/10/2017, not currently breastfeeding. Temp (24hrs), Av.5 °F (36.9 °C), Min:98.2 °F (36.8 °C), Max:98.6 °F (37 °C)    Systolic (33AUE), NNB:073 , Min:118 , DCL:819      Diastolic (50MZQ), NVO:16, Min:56, Max:78       Physical Exam:  Patient without distress.   Heart: Regular rate and rhythm, no murmur  Lung: clear to auscultation throughout lung fields, no wheezes, no rales, no rhonchi and normal respiratory effort  Abdomen: soft, nontender  Fundus: soft and non tender  Lower Extremities: trace edema in LE bilaterally       Membranes:  Intact    Uterine Activity:  None per pt    Fetal Heart Rate:  FHTs 135 this am; extended monitoring  - reassuring with +accelerations        Labs:   Recent Results (from the past 36 hour(s))   GLUCOSE, POC    Collection Time: 17  3:07 PM   Result Value Ref Range    Glucose (POC) 90 65 - 100 mg/dL    Performed by Chopra Araceli    METABOLIC PANEL, COMPREHENSIVE    Collection Time: 17  4:01 PM   Result Value Ref Range    Sodium 135 (L) 136 - 145 mmol/L Potassium 4.0 3.5 - 5.1 mmol/L    Chloride 102 97 - 108 mmol/L    CO2 28 21 - 32 mmol/L    Anion gap 5 5 - 15 mmol/L    Glucose 124 (H) 65 - 100 mg/dL    BUN 6 6 - 20 MG/DL    Creatinine 0.37 (L) 0.55 - 1.02 MG/DL    BUN/Creatinine ratio 16 12 - 20      GFR est AA >60 >60 ml/min/1.73m2    GFR est non-AA >60 >60 ml/min/1.73m2    Calcium 8.4 (L) 8.5 - 10.1 MG/DL    Bilirubin, total 0.3 0.2 - 1.0 MG/DL    ALT (SGPT) 8 (L) 12 - 78 U/L    AST (SGOT) 12 (L) 15 - 37 U/L    Alk. phosphatase 52 45 - 117 U/L    Protein, total 6.9 6.4 - 8.2 g/dL    Albumin 2.5 (L) 3.5 - 5.0 g/dL    Globulin 4.4 (H) 2.0 - 4.0 g/dL    A-G Ratio 0.6 (L) 1.1 - 2.2     CBC W/O DIFF    Collection Time: 07/18/17  4:01 PM   Result Value Ref Range    WBC 10.3 3.6 - 11.0 K/uL    RBC 4.54 3.80 - 5.20 M/uL    HGB 12.0 11.5 - 16.0 g/dL    HCT 37.2 35.0 - 47.0 %    MCV 81.9 80.0 - 99.0 FL    MCH 26.4 26.0 - 34.0 PG    MCHC 32.3 30.0 - 36.5 g/dL    RDW 14.9 (H) 11.5 - 14.5 %    PLATELET 241 786 - 709 K/uL   GLUCOSE, POC    Collection Time: 07/18/17  5:22 PM   Result Value Ref Range    Glucose (POC) 133 (H) 65 - 100 mg/dL    Performed by Buster, POC    Collection Time: 07/18/17  8:00 PM   Result Value Ref Range    Glucose (POC) 215 (H) 65 - 100 mg/dL    Performed by Gordo Hough    GLUCOSE, POC    Collection Time: 07/18/17 10:15 PM   Result Value Ref Range    Glucose (POC) 193 (H) 65 - 100 mg/dL    Performed by Sebastián Pastor    GLUCOSE, POC    Collection Time: 07/19/17  7:14 AM   Result Value Ref Range    Glucose (POC) 221 (H) 65 - 100 mg/dL    Performed by Sebastián Pastor        Assessment and Plan:      Principal Problem:    Uncontrolled diabetes mellitus ([address] (7/18/2017)    Active Problems:    Diabetes in pregnancy (7/18/2017)      Diabetes-Type 2 in the setting of pregnancy  - Endocrinologist is Dr. Zendejas Ba. Pt with recent BGs in the 150-200s postprandial. Fastings 180-200s.    - Current outpatient insulin dosing is Lantus 60 units QHS and Humalog 80 units with meals. If premeal is >200, then she is using 100 units.   - Plan is admission until BGs are stabilized. Glucose stabilizer discussed, but not felt to be indicated per IM hospitalist  - IM Hospitalist consulted to manage DM - sliding scale insulin ordered per his recommendations  - Diabetes education - plan for nutrition consult today with DM education center.  - Fetal ultrasound completed 7/18 with MFM- all WNL. Anticipate FHTs qshift for fetal monitoring, extended monitoring daily. - Appreciate input on pt's care from Hospitalist, Endocrinologist, and MFM.    -Continue care on antepartum unit at this time       Signed By: Anahi Lemos DO     July 19, 2017

## 2017-07-19 NOTE — PROGRESS NOTES
Hospitalist Progress Note  Alissa Shultz MD  Office: [phone]  Cell: [phone]      Date of Service:  2017  NAME:  Hussain Mckeon  :  1980  MRN:  [id number]      Admission Summary: This is a 81-year-old G4, P2, 32 weeks pregnant female with a past medical history of diabetes   mellitus type 2. She is being followed by Dr. Ignacio Alvarado for her diabetes Last seen by him about a week ago and today, the patient was seen by Dr. Arminda Joshi, who is the [facility] OB[facility]N for the patient. Both Dr. Ignacio Alvarado as well as Dr. Gerda Reyna had concerns about her uncontrolled blood sugars and that is why the patient was sent to [address] [place] [address] for blood sugar management. The patient checks her blood sugars 6 times daily and her usual blood sugars are fasting between 180 to 240 and post meal, her blood sugars are 180 to 220 and she takes Levemir 60 units every night (which was increased about 1 week ago from 50 units). Also, she takes Humalog 80 units with meals and checks her blood sugar after meals and if it is more than 200, then she gives herself 20 more units. She claims that occasionally she would have low blood sugars and she has had low blood sugars as low as 45 (last one month she had atleast 5 hypoglycemic episodes).      Interval history / Subjective:   She said she had headache this morning now improving, no nausea, vomiting or abdominal pain     Assessment & Plan:     DM-II poorly controlled   -A1c 7.5  -finger stick glucose overnight 133-221, 90 at 1507 on 17 didn't eat at that time  -continue lantus 60 units q hs, increase lispro to 60 units before meals, continue sliding scale 2 hours post prandial, monitor finger stick glucose and will adjust her premeal insulin   -DTC consulted  -I called and discussed with her endocrinologist, Dr Zenon Leyden and we agreed to increase her lispro to 60 units before meal, monitor her finger stick glucose and if not in the target ranges will further increase lispro by 5 units     IUP GA 27 weeks  -management per ObGyn     Mild hyponatremia   -repeat bmp in am    Code status: Full  Code  DVT prophylaxis:SCD    Care Plan discussed with: Patient/Family, Nurse and   Disposition: TBD     Hospital Problems  Date Reviewed: 7/18/2017          Codes Class Noted POA    * (Principal)Uncontrolled diabetes mellitus ([facility] [address] ICD-10-CM: E11.65  ICD-9-CM: 250.02  7/18/2017 Yes        Diabetes in pregnancy ICD-10-CM: O24.919  ICD-9-CM: 648.00  7/18/2017 Unknown              Vital Signs:    Last 24hrs VS reviewed since prior progress note. Most recent are:  Visit Vitals    /78 (BP 1 Location: Right arm, BP Patient Position: At rest)    Pulse (!) 102    Temp 98.2 °F (36.8 °C)    Resp 18    Ht 5' 1\" (1.549 m)    Wt 86.3 kg (190 lb 4 oz)    Breastfeeding No    BMI 35.95 kg/m2       No intake or output data in the 24 hours ending 07/19/17 0816     Physical Examination:             Constitutional:  No acute distress, cooperative, pleasant    ENT:  Oral mucous moist, oropharynx benign. Neck supple,    Resp:  CTA bilaterally. No wheezing/rhonchi/rales. No accessory muscle use   CV:  Regular rhythm, normal rate, no murmurs, gallops, rubs    GI:  Soft, gravid uterus, abdomen non tender. normoactive bowel sounds, no hepatosplenomegaly     Musculoskeletal:  No edema    Neurologic:  Moves all extremities.   AAOx3, CN II-XII reviewed     Skin:  Good turgor, no rashes or ulcers       Data Review:    Review and/or order of clinical lab test      Labs:     Recent Labs      07/18/17   1601   WBC  10.3   HGB  12.0   HCT  37.2   PLT  159     Recent Labs      07/18/17   1601   NA  135*   K  4.0   CL  102   CO2  28   BUN  6   CREA  0.37*   GLU  124*   CA  8.4*     Recent Labs      07/18/17   1601   SGOT  12*   ALT  8*   AP  52   TBILI  0.3   TP  6.9   ALB  2.5*   GLOB  4.4*     No results for input(s): INR, PTP, APTT in the last 72 hours.    No lab exists for component: INREXT   No results for input(s): FE, TIBC, PSAT, FERR in the last 72 hours. No results found for: FOL, RBCF   No results for input(s): PH, PCO2, PO2 in the last 72 hours. No results for input(s): CPK, CKNDX, TROIQ in the last 72 hours.     No lab exists for component: CPKMB  Lab Results   Component Value Date/Time    Cholesterol, total 246 11/16/2016 08:39 AM    HDL Cholesterol 43 11/16/2016 08:39 AM    LDL, calculated 158 11/16/2016 08:39 AM    Triglyceride 227 11/16/2016 08:39 AM    CHOL/HDL Ratio 7.0 06/13/2013 02:50 PM     Lab Results   Component Value Date/Time    Glucose (POC) 221 07/19/2017 07:14 AM    Glucose (POC) 193 07/18/2017 10:15 PM    Glucose (POC) 215 07/18/2017 08:00 PM    Glucose (POC) 133 07/18/2017 05:22 PM    Glucose (POC) 90 07/18/2017 03:07 PM     Lab Results   Component Value Date/Time    Color YELLOW/STRAW 04/28/2017 08:02 PM    Appearance CLEAR 04/28/2017 08:02 PM    Specific gravity 1.029 04/28/2017 08:02 PM    pH (UA) 7.0 04/28/2017 08:02 PM    Protein NEGATIVE  04/28/2017 08:02 PM    Glucose >1000 04/28/2017 08:02 PM    Ketone NEGATIVE  04/28/2017 08:02 PM    Bilirubin NEGATIVE  04/28/2017 08:02 PM    Urobilinogen 1.0 04/28/2017 08:02 PM    Nitrites NEGATIVE  04/28/2017 08:02 PM    Leukocyte Esterase NEGATIVE  04/28/2017 08:02 PM    Epithelial cells MANY 04/28/2017 08:02 PM    Bacteria 1+ 04/28/2017 08:02 PM    WBC 0-4 04/28/2017 08:02 PM    RBC 0-5 04/28/2017 08:02 PM         Medications Reviewed:     Current Facility-Administered Medications   Medication Dose Route Frequency    diph,Pertuss(AC),Tet Vac-PF (BOOSTRIX) suspension 0.5 mL  0.5 mL IntraMUSCular PRIOR TO DISCHARGE    acetaminophen (TYLENOL) tablet 650 mg  650 mg Oral Q4H PRN    prenatal vit-iron fumarate-fa (PRENATAL PLUS with IRON) tablet 1 Tab  1 Tab Oral DAILY    sodium chloride (NS) flush 5-10 mL  5-10 mL IntraVENous Q8H    sodium chloride (NS) flush 5-10 mL  5-10 mL IntraVENous PRN    diphenhydrAMINE (BENADRYL) capsule 25 mg  25 mg Oral Q4H PRN    zolpidem (AMBIEN) tablet 5 mg  5 mg Oral QHS PRN    famotidine (PEPCID) tablet 20 mg  20 mg Oral Q12H PRN    insulin lispro (HUMALOG) injection 50 Units  50 Units SubCUTAneous TIDAC    insulin lispro (HUMALOG) injection   SubCUTAneous AC&HS    glucose chewable tablet 16 g  4 Tab Oral PRN    glucagon (GLUCAGEN) injection 1 mg  1 mg IntraMUSCular PRN    dextrose 10 % infusion 125-250 mL  125-250 mL IntraVENous PRN    insulin glargine (LANTUS) injection 30 Units  30 Units SubCUTAneous QHS    And    insulin glargine (LANTUS) injection 30 Units  30 Units SubCUTAneous QHS     ______________________________________________________________________  EXPECTED LENGTH OF STAY: - - -  ACTUAL LENGTH OF STAY:          1                 Ying Caba MD

## 2017-07-20 LAB
ALBUMIN SERPL BCP-MCNC: 2.3 G/DL (ref 3.5–5)
ALBUMIN/GLOB SERPL: 0.5 {RATIO} (ref 1.1–2.2)
ALP SERPL-CCNC: 46 U/L (ref 45–117)
ALT SERPL-CCNC: 9 U/L (ref 12–78)
ANION GAP BLD CALC-SCNC: 9 MMOL/L (ref 5–15)
AST SERPL W P-5'-P-CCNC: 8 U/L (ref 15–37)
BILIRUB SERPL-MCNC: 0.3 MG/DL (ref 0.2–1)
BUN SERPL-MCNC: 10 MG/DL (ref 6–20)
BUN/CREAT SERPL: 28 (ref 12–20)
CALCIUM SERPL-MCNC: 8.6 MG/DL (ref 8.5–10.1)
CHLORIDE SERPL-SCNC: 102 MMOL/L (ref 97–108)
CO2 SERPL-SCNC: 23 MMOL/L (ref 21–32)
CREAT SERPL-MCNC: 0.36 MG/DL (ref 0.55–1.02)
ERYTHROCYTE [DISTWIDTH] IN BLOOD BY AUTOMATED COUNT: 14.7 % (ref 11.5–14.5)
GLOBULIN SER CALC-MCNC: 4.2 G/DL (ref 2–4)
GLUCOSE BLD STRIP.AUTO-MCNC: 144 MG/DL (ref 65–100)
GLUCOSE BLD STRIP.AUTO-MCNC: 174 MG/DL (ref 65–100)
GLUCOSE BLD STRIP.AUTO-MCNC: 183 MG/DL (ref 65–100)
GLUCOSE BLD STRIP.AUTO-MCNC: 191 MG/DL (ref 65–100)
GLUCOSE SERPL-MCNC: 151 MG/DL (ref 65–100)
HCT VFR BLD AUTO: 36 % (ref 35–47)
HGB BLD-MCNC: 11.6 G/DL (ref 11.5–16)
MCH RBC QN AUTO: 26.5 PG (ref 26–34)
MCHC RBC AUTO-ENTMCNC: 32.2 G/DL (ref 30–36.5)
MCV RBC AUTO: 82.2 FL (ref 80–99)
PLATELET # BLD AUTO: 143 K/UL (ref 150–400)
POTASSIUM SERPL-SCNC: 3.8 MMOL/L (ref 3.5–5.1)
PROT SERPL-MCNC: 6.5 G/DL (ref 6.4–8.2)
RBC # BLD AUTO: 4.38 M/UL (ref 3.8–5.2)
SERVICE CMNT-IMP: ABNORMAL
SODIUM SERPL-SCNC: 134 MMOL/L (ref 136–145)
WBC # BLD AUTO: 10.9 K/UL (ref 3.6–11)

## 2017-07-20 PROCEDURE — 80053 COMPREHEN METABOLIC PANEL: CPT | Performed by: HOSPITALIST

## 2017-07-20 PROCEDURE — 65410000002 HC RM PRIVATE OB

## 2017-07-20 PROCEDURE — 74011636637 HC RX REV CODE- 636/637: Performed by: HOSPITALIST

## 2017-07-20 PROCEDURE — 74011636637 HC RX REV CODE- 636/637: Performed by: OBSTETRICS & GYNECOLOGY

## 2017-07-20 PROCEDURE — 74011250637 HC RX REV CODE- 250/637: Performed by: OBSTETRICS & GYNECOLOGY

## 2017-07-20 PROCEDURE — 36415 COLL VENOUS BLD VENIPUNCTURE: CPT | Performed by: HOSPITALIST

## 2017-07-20 PROCEDURE — 85027 COMPLETE CBC AUTOMATED: CPT | Performed by: HOSPITALIST

## 2017-07-20 PROCEDURE — 82962 GLUCOSE BLOOD TEST: CPT

## 2017-07-20 RX ORDER — MAGNESIUM SULFATE 100 %
4 CRYSTALS MISCELLANEOUS AS NEEDED
Status: DISCONTINUED | OUTPATIENT
Start: 2017-07-20 | End: 2017-07-20 | Stop reason: SDUPTHER

## 2017-07-20 RX ORDER — DEXTROSE 50 % IN WATER (D50W) INTRAVENOUS SYRINGE
12.5-25 AS NEEDED
Status: DISCONTINUED | OUTPATIENT
Start: 2017-07-20 | End: 2017-07-20 | Stop reason: SDUPTHER

## 2017-07-20 RX ORDER — INSULIN LISPRO 100 [IU]/ML
INJECTION, SOLUTION INTRAVENOUS; SUBCUTANEOUS
Status: DISCONTINUED | OUTPATIENT
Start: 2017-07-20 | End: 2017-07-21

## 2017-07-20 RX ORDER — INSULIN LISPRO 100 [IU]/ML
60 INJECTION, SOLUTION INTRAVENOUS; SUBCUTANEOUS
Status: DISCONTINUED | OUTPATIENT
Start: 2017-07-20 | End: 2017-07-21

## 2017-07-20 RX ORDER — INSULIN LISPRO 100 [IU]/ML
55 INJECTION, SOLUTION INTRAVENOUS; SUBCUTANEOUS
Status: DISCONTINUED | OUTPATIENT
Start: 2017-07-20 | End: 2017-07-20

## 2017-07-20 RX ADMIN — INSULIN LISPRO 55 UNITS: 100 INJECTION, SOLUTION INTRAVENOUS; SUBCUTANEOUS at 07:45

## 2017-07-20 RX ADMIN — INSULIN LISPRO 55 UNITS: 100 INJECTION, SOLUTION INTRAVENOUS; SUBCUTANEOUS at 12:17

## 2017-07-20 RX ADMIN — FAMOTIDINE 20 MG: 20 TABLET ORAL at 10:33

## 2017-07-20 RX ADMIN — INSULIN LISPRO 3 UNITS: 100 INJECTION, SOLUTION INTRAVENOUS; SUBCUTANEOUS at 10:29

## 2017-07-20 RX ADMIN — INSULIN LISPRO 3 UNITS: 100 INJECTION, SOLUTION INTRAVENOUS; SUBCUTANEOUS at 19:48

## 2017-07-20 RX ADMIN — INSULIN GLARGINE 30 UNITS: 100 INJECTION, SOLUTION SUBCUTANEOUS at 22:28

## 2017-07-20 RX ADMIN — Medication 1 TABLET: at 09:12

## 2017-07-20 RX ADMIN — INSULIN LISPRO 60 UNITS: 100 INJECTION, SOLUTION INTRAVENOUS; SUBCUTANEOUS at 16:53

## 2017-07-20 RX ADMIN — ZOLPIDEM TARTRATE 5 MG: 5 TABLET ORAL at 22:28

## 2017-07-20 RX ADMIN — FAMOTIDINE 20 MG: 20 TABLET ORAL at 19:49

## 2017-07-20 RX ADMIN — INSULIN LISPRO 3 UNITS: 100 INJECTION, SOLUTION INTRAVENOUS; SUBCUTANEOUS at 14:44

## 2017-07-20 RX ADMIN — Medication 10 ML: at 16:57

## 2017-07-20 RX ADMIN — Medication 10 ML: at 22:28

## 2017-07-20 RX ADMIN — Medication 10 ML: at 10:37

## 2017-07-20 RX ADMIN — INSULIN LISPRO 2 UNITS: 100 INJECTION, SOLUTION INTRAVENOUS; SUBCUTANEOUS at 07:46

## 2017-07-20 NOTE — PROGRESS NOTES
Endocrine Chart Review    Received call from patients primary treating team,  Discussed her current care, ongoing hyperglycemia,  Discussed her history up to the time of her admission through OBGYN,  Please see my prior progress note concerning her history and treatment. Recommended increasing humalog/lispro to 60 units with each meal, and increasing by 5 units each day until she reaches her pregnancy goals. Recall that in the outpatient setting, I had reached up to 90 units with each meal ( due to different food/drink habit outside ). Lantus ok at 60 units, may increase to 65 units if sugar continues to rise overnight (difference between bedtime sugar and following AM sugar) by more than 10 points. Thanks,    Joey Garcia.  [address] Endocrinology  [address] [place] [address]

## 2017-07-20 NOTE — PROGRESS NOTES
Bedside shift change report given to Aleja Hahn RN (oncoming nurse) by Jaydon Rizo RN (offgoing nurse). Report included the following information SBAR, Kardex and MAR.

## 2017-07-20 NOTE — DIABETES MGMT
DTC Progress Note    Recommendations/ Comments: FBG remains elevated - 174 mg/dL this morning. This is down form 221 yesterday but remains far above the goal of < 90 mg/dL for pregnaacy. Post prandial BG's remain elevated in spite of being on the GDM diet in the hospital.    Concerned that dietary changes alone will not be enough to improve BG's, especially the FBG. If appropriate, please consider  Re-consider starting insulin gtt to evaluate insulin needs  If decide not to do the gtt, consider adding NPH 25 units at 11PM to control the FBG    Chart reviewed on Angelica Mccann. Patient is a 31 y.o. female with Type 2 DM and high degree of insulin resistance. A1c:   Lab Results   Component Value Date/Time    Hemoglobin A1c 7.9 03/07/2017 10:40 AM    Hemoglobin A1c 9.9 11/16/2016 08:39 AM       Recent Glucose Results:   Lab Results   Component Value Date/Time     (H) 07/20/2017 05:21 AM    GLUCPOC 183 (H) 07/20/2017 10:21 AM    GLUCPOC 174 (H) 07/20/2017 05:30 AM    GLUCPOC 157 (H) 07/19/2017 08:43 PM        Lab Results   Component Value Date/Time    Creatinine 0.36 07/20/2017 05:21 AM     Estimated Creatinine Clearance: 219.7 mL/min (based on Cr of 0.36). Active Orders   Diet    DIET GESTATIONAL DIABETIC [id number]        PO intake: No data found. Current hospital DM medication: Lantus 60 units at bedtime, lispro 55 units AC and lispro resistant correction scale    Will continue to follow as needed.     Thank you  Elesa Mortimer RN, CDE

## 2017-07-20 NOTE — PROGRESS NOTES
0778 Bedside report received from Harley Frey RN. Pt denies complaints. 1550 Bedside report given Darrel Calvillo RN. Hourly rounding performed from 6035-3705.

## 2017-07-20 NOTE — PROGRESS NOTES
Initial APU LC visit - This is moms 3rd baby and she is not planning to breast feed she stated that she tried for the first 2 babies which involved the NICU and pumping and was not successful. Discussed the importance of breast milk for babies in general, and the extra importance and benefits for pre-term babies. Discussed pumping and storage of breast milk. Discussed family centered care as it relates to the term baby and what we do in the NICU to keep the family at the center of our care. Answered moms questions. Will continue to follow.

## 2017-07-20 NOTE — PROGRESS NOTES
AntePartum Progress Note    Ashley Amaro  [id number]    Patient admitted for management of BG control 27w2d Estimated Date of Delivery: 10/17/17 states she does not  have  headache , abdominal pain  , contractions, right upper quadrant pain  , vaginal bleeding  and vaginal leaking of fluid . Pt reports good fetal movement. States that she slept well last night after taking sleeping aid. Vitals:  Patient Vitals for the past 24 hrs:   BP Temp Pulse Resp   17 0513 120/74 97.6 °F (36.4 °C) 98 16   17 2227 125/62 98.2 °F (36.8 °C) 95 16   17 1804 133/67 98.3 °F (36.8 °C) (!) 101 18   17 0952 121/89 97.8 °F (36.6 °C) (!) 110 18     Temp (24hrs), Av °F (36.7 °C), Min:97.6 °F (36.4 °C), Max:98.3 °F (36.8 °C)    I&O:                    NST:  +FHTs, extended monitoring this AM  Uterine Activity: None    Exam:  Patient without distress.                Abdomen soft, non-tender               Fundus soft and non tender               Lower extremities edema No                                           Labs:   Recent Results (from the past 24 hour(s))   GLUCOSE, POC    Collection Time: 17  9:55 AM   Result Value Ref Range    Glucose (POC) 168 (H) 65 - 100 mg/dL    Performed by Marisol Bush    GLUCOSE, POC    Collection Time: 17  1:55 PM   Result Value Ref Range    Glucose (POC) 189 (H) 65 - 100 mg/dL    Performed by VINOD MARTINEZ    GLUCOSE, POC    Collection Time: 17  8:43 PM   Result Value Ref Range    Glucose (POC) 157 (H) 65 - 100 mg/dL    Performed by Witt Common    CBC W/O DIFF    Collection Time: 17  5:21 AM   Result Value Ref Range    WBC 10.9 3.6 - 11.0 K/uL    RBC 4.38 3.80 - 5.20 M/uL    HGB 11.6 11.5 - 16.0 g/dL    HCT 36.0 35.0 - 47.0 %    MCV 82.2 80.0 - 99.0 FL    MCH 26.5 26.0 - 34.0 PG    MCHC 32.2 30.0 - 36.5 g/dL    RDW 14.7 (H) 11.5 - 14.5 %    PLATELET 973 (L) 610 - 733 K/uL   METABOLIC PANEL, COMPREHENSIVE    Collection Time: 17  5:21 AM Result Value Ref Range    Sodium 134 (L) 136 - 145 mmol/L    Potassium 3.8 3.5 - 5.1 mmol/L    Chloride 102 97 - 108 mmol/L    CO2 23 21 - 32 mmol/L    Anion gap 9 5 - 15 mmol/L    Glucose 151 (H) 65 - 100 mg/dL    BUN 10 6 - 20 MG/DL    Creatinine 0.36 (L) 0.55 - 1.02 MG/DL    BUN/Creatinine ratio 28 (H) 12 - 20      GFR est AA >60 >60 ml/min/1.73m2    GFR est non-AA >60 >60 ml/min/1.73m2    Calcium 8.6 8.5 - 10.1 MG/DL    Bilirubin, total 0.3 0.2 - 1.0 MG/DL    ALT (SGPT) 9 (L) 12 - 78 U/L    AST (SGOT) 8 (L) 15 - 37 U/L    Alk. phosphatase 46 45 - 117 U/L    Protein, total 6.5 6.4 - 8.2 g/dL    Albumin 2.3 (L) 3.5 - 5.0 g/dL    Globulin 4.2 (H) 2.0 - 4.0 g/dL    A-G Ratio 0.5 (L) 1.1 - 2.2     GLUCOSE, POC    Collection Time: 17  5:30 AM   Result Value Ref Range    Glucose (POC) 174 (H) 65 - 100 mg/dL    Performed by Shireen Jiménez and Plan:   IUP @ 27w2d   Patient Active Problem List    Diagnosis Date Noted    Uncontrolled diabetes mellitus ([address] 2017    Diabetes in pregnancy 2017    Pregnant 2017    Microalbuminuria 2016    Mixed hyperlipidemia 2015    BMI 34.0-34.9,adult 2015    Medically noncompliant 2015    Labor and delivery complicated by fetal stress 2014    H/O:  2014    DM (diabetes mellitus), gestational 2014    Diabetes mellitus complicating pregnancy, antepartum 2014    Obesity 2013    Hyperlipidemia 2013    Type II diabetes mellitus, uncontrolled ([address] 2013       Diabetes-Type 2 in the setting of pregnancy  - Outpatient Endocrinologist is Dr. Easton Pettit. - Glucose stabilizer discussed, but not felt to be indicated per IM hospitalist  - IM Hospitalist consulted to manage DM - sliding scale insulin ordered per their recommendations. High insulin dosing required due to insulin resistance.   - Diabetes education -continue to follow with nutrition counselor with DM education center.  - Fetal ultrasound completed 7/18 with MFM- all WNL. Anticipate FHTs qshift for fetal monitoring, extended monitoring daily. - Appreciate input on pt's care from Hospitalist, Endocrinologist, and MFM. - Continue care on antepartum unit at this time  - Pt requesting inpatient mgmt until Sunday.

## 2017-07-20 NOTE — PROGRESS NOTES
Consult received. Reviewed chart. CM attempted to meet with pt at the bedside this PM to introduce role of case management and offer support. Upon entering the room, pt was visiting with a friend and her . CM offered to return at a later time to discuss resources for home and complete assessment. Will continue to follow.     Care Management Interventions  Transition of Care Consult (CM Consult): Discharge Planning (home resources)  MyChart Signup: No  Discharge Durable Medical Equipment: No  Health Maintenance Reviewed: Yes  Physical Therapy Consult: No  Occupational Therapy Consult: No  Speech Therapy Consult: No  Current Support Network: Lives with Spouse, Own Home  Confirm Follow Up Transport: Family  Plan discussed with Pt/Family/Caregiver: Yes  Freedom of Choice Offered: Yes  Discharge Location  Discharge Placement: Home    RAFI Elias

## 2017-07-20 NOTE — PROGRESS NOTES
Bedside and Verbal shift change report given to JOSE Nieto RN (oncoming nurse) by Bruce APODACA (offgoing nurse). Report included the following information SBAR, Kardex, MAR and Recent Results. [address] to nurses station stating her stomach hurts and she is nausea for past 30 minutes. Placed on monitor. No contractions noted. Reports decreased fetal movement. PLaced on monitor for further assessment. 1924 Off  Monitor. No contractions noted. Positive fetal movement noted.  with acceleration.

## 2017-07-21 LAB
GLUCOSE BLD STRIP.AUTO-MCNC: 107 MG/DL (ref 65–100)
GLUCOSE BLD STRIP.AUTO-MCNC: 122 MG/DL (ref 65–100)
GLUCOSE BLD STRIP.AUTO-MCNC: 140 MG/DL (ref 65–100)
GLUCOSE BLD STRIP.AUTO-MCNC: 140 MG/DL (ref 65–100)
GLUCOSE BLD STRIP.AUTO-MCNC: 99 MG/DL (ref 65–100)
SERVICE CMNT-IMP: ABNORMAL
SERVICE CMNT-IMP: NORMAL

## 2017-07-21 PROCEDURE — 74011250637 HC RX REV CODE- 250/637: Performed by: OBSTETRICS & GYNECOLOGY

## 2017-07-21 PROCEDURE — 74011636637 HC RX REV CODE- 636/637: Performed by: OBSTETRICS & GYNECOLOGY

## 2017-07-21 PROCEDURE — 59025 FETAL NON-STRESS TEST: CPT

## 2017-07-21 PROCEDURE — 82962 GLUCOSE BLOOD TEST: CPT

## 2017-07-21 PROCEDURE — 74011636637 HC RX REV CODE- 636/637: Performed by: HOSPITALIST

## 2017-07-21 PROCEDURE — 65410000002 HC RM PRIVATE OB

## 2017-07-21 RX ORDER — INSULIN GLARGINE 100 [IU]/ML
30 INJECTION, SOLUTION SUBCUTANEOUS
Status: DISCONTINUED | OUTPATIENT
Start: 2017-07-21 | End: 2017-07-24 | Stop reason: HOSPADM

## 2017-07-21 RX ORDER — INSULIN LISPRO 100 [IU]/ML
65 INJECTION, SOLUTION INTRAVENOUS; SUBCUTANEOUS
Status: DISCONTINUED | OUTPATIENT
Start: 2017-07-21 | End: 2017-07-23

## 2017-07-21 RX ORDER — INSULIN LISPRO 100 [IU]/ML
INJECTION, SOLUTION INTRAVENOUS; SUBCUTANEOUS
Status: DISCONTINUED | OUTPATIENT
Start: 2017-07-21 | End: 2017-07-22

## 2017-07-21 RX ORDER — INSULIN GLARGINE 100 [IU]/ML
35 INJECTION, SOLUTION SUBCUTANEOUS
Status: DISCONTINUED | OUTPATIENT
Start: 2017-07-21 | End: 2017-07-24 | Stop reason: HOSPADM

## 2017-07-21 RX ADMIN — INSULIN GLARGINE 35 UNITS: 100 INJECTION, SOLUTION SUBCUTANEOUS at 21:44

## 2017-07-21 RX ADMIN — Medication 10 ML: at 15:25

## 2017-07-21 RX ADMIN — INSULIN GLARGINE 30 UNITS: 100 INJECTION, SOLUTION SUBCUTANEOUS at 21:45

## 2017-07-21 RX ADMIN — Medication 10 ML: at 21:37

## 2017-07-21 RX ADMIN — INSULIN LISPRO 3 UNITS: 100 INJECTION, SOLUTION INTRAVENOUS; SUBCUTANEOUS at 20:41

## 2017-07-21 RX ADMIN — Medication 10 ML: at 07:58

## 2017-07-21 RX ADMIN — INSULIN LISPRO 65 UNITS: 100 INJECTION, SOLUTION INTRAVENOUS; SUBCUTANEOUS at 12:34

## 2017-07-21 RX ADMIN — INSULIN LISPRO 3 UNITS: 100 INJECTION, SOLUTION INTRAVENOUS; SUBCUTANEOUS at 09:40

## 2017-07-21 RX ADMIN — INSULIN LISPRO 65 UNITS: 100 INJECTION, SOLUTION INTRAVENOUS; SUBCUTANEOUS at 17:56

## 2017-07-21 RX ADMIN — FAMOTIDINE 20 MG: 20 TABLET ORAL at 21:45

## 2017-07-21 RX ADMIN — ZOLPIDEM TARTRATE 5 MG: 5 TABLET ORAL at 23:10

## 2017-07-21 RX ADMIN — Medication 1 TABLET: at 09:32

## 2017-07-21 RX ADMIN — INSULIN LISPRO 60 UNITS: 100 INJECTION, SOLUTION INTRAVENOUS; SUBCUTANEOUS at 07:58

## 2017-07-21 NOTE — PROGRESS NOTES
Bedside and Verbal shift change report given to BRITTANY Hughes RN (oncoming nurse) by Tristen Gunter RN (offgoing nurse). Report included the following information SBAR, MAR and Recent Results.

## 2017-07-21 NOTE — PROGRESS NOTES
Bedside and Verbal shift change report given to JULISSA Orellana RN and GIBSON Freeman RN by Segundo Guerrero RN. Report included the following information SBAR, Intake/Output and MAR. Hourly rounds completed from 1600 to 2000.

## 2017-07-21 NOTE — PROGRESS NOTES
High Risk Obstetrics Progress Note    Name: Lavon Vazquez MRN: [id number]  SSN: [id number]    YOB: 1980  Age: 31 y.o. Sex: female      Subjective:      LOS: 3 days    Estimated Date of Delivery: 10/17/17   Gestational Age Today: 35w2d     Patient admitted for glucose control in pregnancy. Denies VB/LOF. No contractions. Reports DFM with no movement felt since yesterday at time of monitoring. Remainder ROS negative. Reports trying to be compliant with diet. Objective:     Vitals:  Blood pressure 111/59, pulse 99, temperature 98.1 °F (36.7 °C), resp. rate 14, height 5' 1\" (1.549 m), weight 86.3 kg (190 lb 4 oz), last menstrual period 01/10/2017, not currently breastfeeding.    Temp (24hrs), Av.2 °F (36.8 °C), Min:98.1 °F (36.7 °C), Max:98.3 °F (05.2 °C)    Systolic (43JHA), IOB:904 , Min:111 , GXA:222      Diastolic (84VDK), JWU:20, Min:59, Max:67       Intake and Output:          Physical Exam:  Abdomen: soft, nontender  Fundus: soft and non tender  Lower Extremities:  - Edema No       Membranes:  Intact    Uterine Activity:  None    Fetal Heart Rate:  FHT reviewed from yesterday, reassuring         Labs:   Recent Results (from the past 36 hour(s))   GLUCOSE, POC    Collection Time: 17  8:43 PM   Result Value Ref Range    Glucose (POC) 157 (H) 65 - 100 mg/dL    Performed by Yuly Mccray    CBC W/O DIFF    Collection Time: 17  5:21 AM   Result Value Ref Range    WBC 10.9 3.6 - 11.0 K/uL    RBC 4.38 3.80 - 5.20 M/uL    HGB 11.6 11.5 - 16.0 g/dL    HCT 36.0 35.0 - 47.0 %    MCV 82.2 80.0 - 99.0 FL    MCH 26.5 26.0 - 34.0 PG    MCHC 32.2 30.0 - 36.5 g/dL    RDW 14.7 (H) 11.5 - 14.5 %    PLATELET 382 (L) 447 - 405 K/uL   METABOLIC PANEL, COMPREHENSIVE    Collection Time: 17  5:21 AM   Result Value Ref Range    Sodium 134 (L) 136 - 145 mmol/L    Potassium 3.8 3.5 - 5.1 mmol/L    Chloride 102 97 - 108 mmol/L    CO2 23 21 - 32 mmol/L    Anion gap 9 5 - 15 mmol/L    Glucose 151 (H) 65 - 100 mg/dL    BUN 10 6 - 20 MG/DL    Creatinine 0.36 (L) 0.55 - 1.02 MG/DL    BUN/Creatinine ratio 28 (H) 12 - 20      GFR est AA >60 >60 ml/min/1.73m2    GFR est non-AA >60 >60 ml/min/1.73m2    Calcium 8.6 8.5 - 10.1 MG/DL    Bilirubin, total 0.3 0.2 - 1.0 MG/DL    ALT (SGPT) 9 (L) 12 - 78 U/L    AST (SGOT) 8 (L) 15 - 37 U/L    Alk. phosphatase 46 45 - 117 U/L    Protein, total 6.5 6.4 - 8.2 g/dL    Albumin 2.3 (L) 3.5 - 5.0 g/dL    Globulin 4.2 (H) 2.0 - 4.0 g/dL    A-G Ratio 0.5 (L) 1.1 - 2.2     GLUCOSE, POC    Collection Time: 07/20/17  5:30 AM   Result Value Ref Range    Glucose (POC) 174 (H) 65 - 100 mg/dL    Performed by Kilo Rowan    GLUCOSE, POC    Collection Time: 07/20/17 10:21 AM   Result Value Ref Range    Glucose (POC) 183 (H) 65 - 100 mg/dL    Performed by Lennie, POC    Collection Time: 07/20/17  2:24 PM   Result Value Ref Range    Glucose (POC) 144 (H) 65 - 100 mg/dL    Performed by [address] [place] [address], POC    Collection Time: 07/20/17  7:30 PM   Result Value Ref Range    Glucose (POC) 191 (H) 65 - 100 mg/dL    Performed by VINOD MARTINEZ    GLUCOSE, POC    Collection Time: 07/21/17  2:51 AM   Result Value Ref Range    Glucose (POC) 107 (H) 65 - 100 mg/dL    Performed by [employer]    GLUCOSE, POC    Collection Time: 07/21/17  6:46 AM   Result Value Ref Range    Glucose (POC) 122 (H) 65 - 100 mg/dL    Performed by [employer]        Assessment and Plan:      Diabetes-Type 2 in the setting of pregnancy, IUP at 27w3d  - Outpatient Endocrinologist is Dr. Shireen Benjamin - appreciate Ligia Acuna. As she remains well above pregnancy glucose goals, Lantus increased to 65u qHs, Humalog to 65 before meals. Continue SSI to cover postprandial and fasting BG.  - Glucose stabilizer discussed, but not felt to be indicated per IM hospitalist  - Diabetes education -continue to follow with nutrition counselor with DM education center. Had sweet potato salad overnight.   - Fetal ultrasound completed 7/18 with MFM- all WNL. NST BID. Will monitor now for c/o DFM  - Appreciate input on pt's care from Hospitalist, Endocrinologist, and MFM. - Continue care on antepartum unit at this time  - H/o 2 prior c-sections. - Pt requesting inpatient mgmt until Sunday.      Signed By: Camilo Poe DO     July 21, 2017

## 2017-07-21 NOTE — PROGRESS NOTES
Bedside shift change report given to PARIS Armstrong RN (oncoming nurse) by Governor CONSTANZA Posadas (offgoing nurse). Report included the following information SBAR, Kardex, MAR and Recent Results. 2230: Entered patients room to find her eating half an egg salad sandwich, potato salad, and a diet coke. This is after having a milk and fruit around 2030. Patient reminded about diet restrictions and times. Patient said she\" wouldn't eat anymore after she was finished. \"    [id number]: Hourly rounds completed. 9579-2961: Hourly rounds completed. 5047-9133: Hourly rounds completed.

## 2017-07-21 NOTE — PROGRESS NOTES
Hospitalist Progress Note  Lux Singleton MD  Office: [phone]  Cell:       Date of Service:  2017  NAME:  Ryder Kitchen  :  1980  MRN:  [id number]      Admission Summary: This is a 68-year-old G4, P2, 32 weeks pregnant female with a past medical history of diabetes   mellitus type 2. She is being followed by Dr. Shelley Chairez for her diabetes Last seen by him about a week ago and today, the patient was seen by Dr. Concha Bautista, who is the [facility] OB/GYN for the patient. Both Dr. Shelley Chairez as well as Dr. Isela Cunningham had concerns about her uncontrolled blood sugars and that is why the patient was sent to [facility] for blood sugar management. The patient checks her blood sugars 6 times daily and her usual blood sugars are fasting between 180 to 240 and post meal, her blood sugars are 180 to 220 and she takes Levemir 60 units every night (which was increased about 1 week ago from 50 units). Also, she takes Humalog 80 units with meals and checks her blood sugar after meals and if it is more than 200, then she gives herself 20 more units. She claims that occasionally she would have low blood sugars and she has had low blood sugars as low as 45 (last one month she had atleast 5 hypoglycemic episodes).      Interval history / Subjective:   No acute complaint, no nausea, vomiting or abdominal pain     Assessment & Plan:     DM-II poorly controlled   -A1c 7.5  -finger stick glucose fasting was 122 this morning  -increase lantus 65 units q hs, increase lispro to 65 units before meals, continue sliding scale 2 hours post prandial, monitor finger stick glucose and will adjust her premeal insulin   -DTC consulted  -discussed with her endocrinologist, Dr Lit Jiang on  and recommend to increase her insulin by 5 units every day if sugar continue to rise   IUP GA 27 weeks  -management per ObGyn     Mild hyponatremia   -repeat bmp in am    Code status: Full  Code  DVT prophylaxis:SCD    Care Plan discussed with: Patient/Family, Nurse and   Disposition: TBD     Hospital Problems  Date Reviewed: 7/18/2017          Codes Class Noted POA    * (Principal)Uncontrolled diabetes mellitus ([facility] ICD-10-CM: E11.65  ICD-9-CM: 250.02  7/18/2017 Yes        Diabetes in pregnancy ICD-10-CM: O24.919  ICD-9-CM: 648.00  7/18/2017 Unknown              Vital Signs:    Last 24hrs VS reviewed since prior progress note. Most recent are:  Visit Vitals    /59 (BP 1 Location: Right arm, BP Patient Position: At rest)    Pulse 99    Temp 98.1 °F (36.7 °C)    Resp 14    Ht 5' 1\" (1.549 m)    Wt 86.3 kg (190 lb 4 oz)    Breastfeeding No    BMI 35.95 kg/m2       No intake or output data in the 24 hours ending 07/21/17 0851     Physical Examination:             Constitutional:  No acute distress, cooperative, pleasant    ENT:  Oral mucous moist, oropharynx benign. Neck supple,    Resp:  CTA bilaterally. No wheezing/rhonchi/rales. No accessory muscle use   CV:  Regular rhythm, normal rate, no murmurs, gallops, rubs    GI:  Soft, gravid uterus, abdomen non tender. normoactive bowel sounds, no hepatosplenomegaly     Musculoskeletal:  No edema    Neurologic:  Moves all extremities.   AAOx3, CN II-XII reviewed     Skin:  Good turgor, no rashes or ulcers       Data Review:    Review and/or order of clinical lab test      Labs:     Recent Labs      07/20/17   0521  07/18/17   1601   WBC  10.9  10.3   HGB  11.6  12.0   HCT  36.0  37.2   PLT  143*  159     Recent Labs      07/20/17   0521  07/18/17   1601   NA  134*  135*   K  3.8  4.0   CL  102  102   CO2  23  28   BUN  10  6   CREA  0.36*  0.37*   GLU  151*  124*   CA  8.6  8.4*     Recent Labs      07/20/17   0521  07/18/17   1601   SGOT  8*  12*   ALT  9*  8*   AP  46  52   TBILI  0.3  0.3   TP  6.5  6.9   ALB  2.3*  2.5*   GLOB  4.2*  4.4*     No results for input(s): INR, PTP, APTT in the last 72 hours.    No lab exists for component: INREXT, INREXT   No results for input(s): FE, TIBC, PSAT, FERR in the last 72 hours. No results found for: FOL, RBCF   No results for input(s): PH, PCO2, PO2 in the last 72 hours. No results for input(s): CPK, CKNDX, TROIQ in the last 72 hours. No lab exists for component: CPKMB  Lab Results   Component Value Date/Time    Cholesterol, total 246 11/16/2016 08:39 AM    HDL Cholesterol 43 11/16/2016 08:39 AM    LDL, calculated 158 11/16/2016 08:39 AM    Triglyceride 227 11/16/2016 08:39 AM    CHOL/HDL Ratio 7.0 06/13/2013 02:50 PM     Lab Results   Component Value Date/Time    Glucose (POC) 122 07/21/2017 06:46 AM    Glucose (POC) 107 07/21/2017 02:51 AM    Glucose (POC) 191 07/20/2017 07:30 PM    Glucose (POC) 144 07/20/2017 02:24 PM    Glucose (POC) 183 07/20/2017 10:21 AM     Lab Results   Component Value Date/Time    Color YELLOW/STRAW 04/28/2017 08:02 PM    Appearance CLEAR 04/28/2017 08:02 PM    Specific gravity 1.029 04/28/2017 08:02 PM    pH (UA) 7.0 04/28/2017 08:02 PM    Protein NEGATIVE  04/28/2017 08:02 PM    Glucose >1000 04/28/2017 08:02 PM    Ketone NEGATIVE  04/28/2017 08:02 PM    Bilirubin NEGATIVE  04/28/2017 08:02 PM    Urobilinogen 1.0 04/28/2017 08:02 PM    Nitrites NEGATIVE  04/28/2017 08:02 PM    Leukocyte Esterase NEGATIVE  04/28/2017 08:02 PM    Epithelial cells MANY 04/28/2017 08:02 PM    Bacteria 1+ 04/28/2017 08:02 PM    WBC 0-4 04/28/2017 08:02 PM    RBC 0-5 04/28/2017 08:02 PM         Medications Reviewed:     Current Facility-Administered Medications   Medication Dose Route Frequency    insulin glargine (LANTUS) inj 35 Units (Syringe 2 of 2. Total dose = 65 units)  35 Units SubCUTAneous QHS    And    insulin glargine (LANTUS) inj 30 Units (Syringe 1 of 2.  Total dose = 65 units)  30 Units SubCUTAneous QHS    insulin lispro (HUMALOG) injection 65 Units  65 Units SubCUTAneous TIDAC    insulin lispro (HUMALOG) injection   SubCUTAneous TIDAC    diph,Pertuss(AC),Tet Vac-PF (BOOSTRIX) suspension 0.5 mL  0.5 mL IntraMUSCular PRIOR TO DISCHARGE    acetaminophen (TYLENOL) tablet 650 mg  650 mg Oral Q4H PRN    prenatal vit-iron fumarate-fa (PRENATAL PLUS with IRON) tablet 1 Tab  1 Tab Oral DAILY    sodium chloride (NS) flush 5-10 mL  5-10 mL IntraVENous Q8H    sodium chloride (NS) flush 5-10 mL  5-10 mL IntraVENous PRN    diphenhydrAMINE (BENADRYL) capsule 25 mg  25 mg Oral Q4H PRN    zolpidem (AMBIEN) tablet 5 mg  5 mg Oral QHS PRN    famotidine (PEPCID) tablet 20 mg  20 mg Oral Q12H PRN    glucose chewable tablet 16 g  4 Tab Oral PRN    glucagon (GLUCAGEN) injection 1 mg  1 mg IntraMUSCular PRN    dextrose 10 % infusion 125-250 mL  125-250 mL IntraVENous PRN     ______________________________________________________________________  EXPECTED LENGTH OF STAY: - - -  ACTUAL LENGTH OF STAY:          3                 Wade Phoenix MD

## 2017-07-21 NOTE — PROGRESS NOTES
CM met with pt at the bedside this PM to introduce role of case management and offer support/resources. Verified contact information and home address. This is pt's third child. She has a three year old and a [de-identified] year old at home. Her [de-identified] year old daughter has Autism. Pt reports she does not work and stays at home to provide care for her children. Pt's  works at [address] [place] [address] [employer] and also [employer]- works full time and does not provide much help in the household. Pt originally from [facility]. She identifies support from her mother, , and mother in law. Pt has friends and a Spiritism family that also provide emotional/social support as needed. Pt stated her mother just left town last month to return to [facility]. Her mother in law is anticipated to arrive next month to provide pt assistance at home. Verified that her 10year old daughter is in school and still receives early intervention services- has speech therapy daily for two hours. Pt requesting additional support in the home to assist with behavioral concerns. CM sent referral to [employer] who can assist pt in nutrition counseling, prenatal education, and community outreach/resources to increase services in the home. Pt is interested in a baby box. CM explained to pt that the baby box will be given at time of delivery/discharge. Pt verbalized understanding and CM showed pt a baby box for reference. Pt plans to deliver at [facility] or [address] [place] [address]. No barriers to discharge identified. Pt will return home with family support and community resources. CM also encouraged pt to reach out to  department to identify additional support (TANF, [place], WIC, etc.)     CM provided opportunity for questions/concerns. None voiced at this time.      RAFI Smalls

## 2017-07-21 NOTE — PROGRESS NOTES
Endocrine Chart Review    Received a call today from Dr. Liz Chandler, attempted to call back this afternoon but unable to reach, left message. Reviewed chart,     Regimen:   Humalog now increased to 65 units with meals  Lantus was increase to 65 units starting this evening  May not need further increase at this time, need to follow her numbers. Remember, it is not advised to change her dose based on 1 or 2 numbers, but rather the over all pattern of numbers as some fluctuations are expected. If most of her numbers are at goal, then her dose is adequate. Blood sugars appear much improved,  Previously I was advised that the patient had preferred to stay in the hospital till Sunday,  If however she is discharged before hand, she will need to keep a strict glucose log to present in clinic,  Again her outpatient insulin needs tend to be higher than the inpatient needs due to clear difference in diet/bevarage intake. Have a great weekend,   My colleagues are on call over the weekend,  I will be out next week but should expect that patient keep her f/u appointment with me in the clinic. Strict glucose logs, strict dietary changes hopefully with discharge. Nirali Ambriz.  [facility]  [address] [place] [address]

## 2017-07-21 NOTE — DIABETES MGMT
DTC Progress Note and  Nutrition Consult    Recommendations/ Comments: FBG improving but remains elevated - 122 mg/dL this morning. This is down from 221 on 7/19 and 174 on 7/20/17 but remains above the goal of FBG < 90 mg/dL for pregnaacy. Post prandial breakfast 140 today. Noted meal time lispro insulin increased to 65 units starting at lunch    Concerned that dietary changes alone may not be enough to improve BG's, especially the FBG. If appropriate, please consider  Consider adding NPH 25 units at 11PM to control the FBG   Change POC to per and 2 hours post each meal   Document po intake  Request RD for help with snacks - see discussion below. She does not like peanut butter or tuna fish. Chart reviewed on Angelica Mccann. Patient is a 31 y.o. female with Type 2 DM and high degree of insulin resistance. PTA meds:  Lantus 65 units at bedtime and lispro 80 -90 units pre meal    I spoke with her nurse, Bernadette Villeda. Many concerns remain regarding her food choices, portions and timing. Met with pt at length again today for dietary instruction. Taught carb counting using hospital \"Room Service\" brochure which lists the carb counts of each selection. Also used the [employer]" which lists portion sizes which are 15 gr of carb. Based on my discussion with her on 7/19/2017, I created a sample menu for both the hospital and home. The confusion is in regards to the snacks. Last night she had a snack and then later had another large snack which included mashed potatoes she had saved from a previous meal.  She was hungry and did not like the snack that was sent to her. I called dietary to order snacks but these are ordered by the dietitian. Bernadette Villeda will contact her to adjust the snacks. She does not like tuna or peanut butter.  Snacks she prefers and which are acceptable  Snack  30 grams  Chicken salad  1/3 cup Rice                        Fruit        Snack  30 grams  Fruit                                         1 cup milk                               Protein   sucha s cheese or meat     Instructed Angelica to do the following:  Drink water, sugar free drinks  Stop drinking sweet drinks including Coke, Pepsi, Sweet tea   Stop eating [employer] fries  Include protein with each meal and snack such as Chicken, eggs, nuts, cheese  Measure portions (at home)  Eat all of the meal or snack; do not save food and eat it later  Walk after each meal     She verbalized understanding and willingness to comply                               A1c:   Lab Results   Component Value Date/Time    Hemoglobin A1c 7.9 03/07/2017 10:40 AM    Hemoglobin A1c 9.9 11/16/2016 08:39 AM       Recent Glucose Results:   Lab Results   Component Value Date/Time    GLUCPOC 140 (H) 07/21/2017 09:34 AM    GLUCPOC 122 (H) 07/21/2017 06:46 AM    GLUCPOC 107 (H) 07/21/2017 02:51 AM        Lab Results   Component Value Date/Time    Creatinine 0.36 07/20/2017 05:21 AM     Estimated Creatinine Clearance: 219.7 mL/min (based on Cr of 0.36). Active Orders   Diet    DIET GESTATIONAL DIABETIC 2200KCAL        PO intake: No data found. Current hospital DM medication: Lantus 65 units at bedtime, lispro 65 units AC and lispro resistant correction scale    Will continue to follow as needed.     Thank you  Salena Delacruz RN, CDE

## 2017-07-22 LAB
ANION GAP BLD CALC-SCNC: 8 MMOL/L (ref 5–15)
BUN SERPL-MCNC: 10 MG/DL (ref 6–20)
BUN/CREAT SERPL: 28 (ref 12–20)
CALCIUM SERPL-MCNC: 8 MG/DL (ref 8.5–10.1)
CHLORIDE SERPL-SCNC: 106 MMOL/L (ref 97–108)
CO2 SERPL-SCNC: 24 MMOL/L (ref 21–32)
CREAT SERPL-MCNC: 0.36 MG/DL (ref 0.55–1.02)
ERYTHROCYTE [DISTWIDTH] IN BLOOD BY AUTOMATED COUNT: 14.8 % (ref 11.5–14.5)
GLUCOSE BLD STRIP.AUTO-MCNC: 107 MG/DL (ref 65–100)
GLUCOSE BLD STRIP.AUTO-MCNC: 118 MG/DL (ref 65–100)
GLUCOSE BLD STRIP.AUTO-MCNC: 121 MG/DL (ref 65–100)
GLUCOSE BLD STRIP.AUTO-MCNC: 133 MG/DL (ref 65–100)
GLUCOSE BLD STRIP.AUTO-MCNC: 140 MG/DL (ref 65–100)
GLUCOSE BLD STRIP.AUTO-MCNC: 189 MG/DL (ref 65–100)
GLUCOSE BLD STRIP.AUTO-MCNC: 74 MG/DL (ref 65–100)
GLUCOSE SERPL-MCNC: 153 MG/DL (ref 65–100)
HCT VFR BLD AUTO: 34.6 % (ref 35–47)
HGB BLD-MCNC: 10.9 G/DL (ref 11.5–16)
MCH RBC QN AUTO: 26.2 PG (ref 26–34)
MCHC RBC AUTO-ENTMCNC: 31.5 G/DL (ref 30–36.5)
MCV RBC AUTO: 83.2 FL (ref 80–99)
PLATELET # BLD AUTO: 140 K/UL (ref 150–400)
POTASSIUM SERPL-SCNC: 3.7 MMOL/L (ref 3.5–5.1)
RBC # BLD AUTO: 4.16 M/UL (ref 3.8–5.2)
SERVICE CMNT-IMP: ABNORMAL
SERVICE CMNT-IMP: NORMAL
SODIUM SERPL-SCNC: 138 MMOL/L (ref 136–145)
WBC # BLD AUTO: 9.8 K/UL (ref 3.6–11)

## 2017-07-22 PROCEDURE — 74011636637 HC RX REV CODE- 636/637: Performed by: HOSPITALIST

## 2017-07-22 PROCEDURE — 74011250637 HC RX REV CODE- 250/637: Performed by: OBSTETRICS & GYNECOLOGY

## 2017-07-22 PROCEDURE — 85027 COMPLETE CBC AUTOMATED: CPT | Performed by: HOSPITALIST

## 2017-07-22 PROCEDURE — 80048 BASIC METABOLIC PNL TOTAL CA: CPT | Performed by: HOSPITALIST

## 2017-07-22 PROCEDURE — 82962 GLUCOSE BLOOD TEST: CPT

## 2017-07-22 PROCEDURE — 36415 COLL VENOUS BLD VENIPUNCTURE: CPT | Performed by: HOSPITALIST

## 2017-07-22 PROCEDURE — 59025 FETAL NON-STRESS TEST: CPT

## 2017-07-22 PROCEDURE — 74011636637 HC RX REV CODE- 636/637: Performed by: OBSTETRICS & GYNECOLOGY

## 2017-07-22 PROCEDURE — 65410000002 HC RM PRIVATE OB

## 2017-07-22 RX ORDER — INSULIN LISPRO 100 [IU]/ML
INJECTION, SOLUTION INTRAVENOUS; SUBCUTANEOUS
Status: DISCONTINUED | OUTPATIENT
Start: 2017-07-22 | End: 2017-07-22

## 2017-07-22 RX ORDER — INSULIN LISPRO 100 [IU]/ML
INJECTION, SOLUTION INTRAVENOUS; SUBCUTANEOUS AS NEEDED
Status: DISCONTINUED | OUTPATIENT
Start: 2017-07-22 | End: 2017-07-24 | Stop reason: HOSPADM

## 2017-07-22 RX ADMIN — INSULIN LISPRO 3 UNITS: 100 INJECTION, SOLUTION INTRAVENOUS; SUBCUTANEOUS at 15:11

## 2017-07-22 RX ADMIN — Medication 10 ML: at 06:46

## 2017-07-22 RX ADMIN — ZOLPIDEM TARTRATE 5 MG: 5 TABLET ORAL at 23:19

## 2017-07-22 RX ADMIN — INSULIN LISPRO 65 UNITS: 100 INJECTION, SOLUTION INTRAVENOUS; SUBCUTANEOUS at 18:05

## 2017-07-22 RX ADMIN — INSULIN LISPRO 65 UNITS: 100 INJECTION, SOLUTION INTRAVENOUS; SUBCUTANEOUS at 12:32

## 2017-07-22 RX ADMIN — FAMOTIDINE 20 MG: 20 TABLET ORAL at 15:17

## 2017-07-22 RX ADMIN — Medication 10 ML: at 15:12

## 2017-07-22 RX ADMIN — Medication 1 TABLET: at 09:01

## 2017-07-22 RX ADMIN — Medication 10 ML: at 22:08

## 2017-07-22 RX ADMIN — INSULIN LISPRO 65 UNITS: 100 INJECTION, SOLUTION INTRAVENOUS; SUBCUTANEOUS at 07:46

## 2017-07-22 RX ADMIN — INSULIN GLARGINE 35 UNITS: 100 INJECTION, SOLUTION SUBCUTANEOUS at 22:08

## 2017-07-22 RX ADMIN — INSULIN GLARGINE 30 UNITS: 100 INJECTION, SOLUTION SUBCUTANEOUS at 22:09

## 2017-07-22 NOTE — PROGRESS NOTES
Bedside shift change report given to PARIS Webster RN (oncoming nurse) by A. [place], RN (offgoing nurse). Report included the following information SBAR, MAR, Accordion and Recent Results.

## 2017-07-22 NOTE — PROGRESS NOTES
Bedside and Verbal shift change report given to AARON (oncoming nurse) by Prema Means RN (offgoing nurse). Report included the following information SBAR, Kardex and MAR.

## 2017-07-22 NOTE — PROGRESS NOTES
Bedside and Verbal shift change report given to BRITTANY Hughes RN (oncoming nurse) by Compa Cooper. Jenni Shipley RN (offgoing nurse). Report included the following information SBAR, MAR and Recent Results.

## 2017-07-22 NOTE — PROGRESS NOTES
Hospitalist Progress Note  Zuleyka Boggs MD  Office: [phone]  Cell:       Date of Service:  2017  NAME:  Feliz Bamberger  :  1980  MRN:  [id number]      Admission Summary: This is a 30-year-old G4, P2, 32 weeks pregnant female with a past medical history of diabetes   mellitus type 2. She is being followed by Dr. Janet Kelsey for her diabetes Last seen by him about a week ago and today, the patient was seen by Dr. Lit Ellsworth, who is the [facility] for the patient. Both Dr. Janet Kelsey as well as Dr. Gm Mejia had concerns about her uncontrolled blood sugars and that is why the patient was sent to [facility] for blood sugar management. The patient checks her blood sugars 6 times daily and her usual blood sugars are fasting between 180 to 240 and post meal, her blood sugars are 180 to 220 and she takes Levemir 60 units every night (which was increased about 1 week ago from 50 units). Also, she takes Humalog 80 units with meals and checks her blood sugar after meals and if it is more than 200, then she gives herself 20 more units. She claims that occasionally she would have low blood sugars and she has had low blood sugars as low as 45 (last one month she had atleast 5 hypoglycemic episodes).      Interval history / Subjective:   No acute complaint, no nausea, vomiting or abdominal pain     Assessment & Plan:     DM-II poorly controlled   -A1c 7.5  -finger stick glucose improving post prandial at 10:35 am was 118, this morning 140s, still not at targe, continue lantus 65 units q hs, lispro to 65 units before meals for today, continue sliding scale and 2 hours post prandial, and will adjust her premeal insulin   -discussed with Dr Milton Ellsworth on  and noted his recommendation    IUP GA 27 weeks 4 days  -management per Tracie Finger   -appreciate endocrinologist input    Mild hyponatremia   -repeated and resolved    Thrombocytopenia unclear etiology  -repeat cbc in am    Code status: Full  Code  DVT prophylaxis:SCD    Care Plan discussed with: Patient/Family, Nurse and   Disposition: home with possible home health     Hospital Problems  Date Reviewed: 7/18/2017          Codes Class Noted POA    * (Principal)Uncontrolled diabetes mellitus ([facility] ICD-10-CM: E11.65  ICD-9-CM: 250.02  7/18/2017 Yes        Diabetes in pregnancy ICD-10-CM: O24.919  ICD-9-CM: 648.00  7/18/2017 Unknown              Vital Signs:    Last 24hrs VS reviewed since prior progress note. Most recent are:  Visit Vitals    /69 (BP 1 Location: Right arm, BP Patient Position: At rest)    Pulse 98    Temp 98 °F (36.7 °C)    Resp 18    Ht 5' 1\" (1.549 m)    Wt 86.3 kg (190 lb 4 oz)    Breastfeeding No    BMI 35.95 kg/m2       No intake or output data in the 24 hours ending 07/22/17 0739     Physical Examination:             Constitutional:  No acute distress, cooperative, pleasant    ENT:  Oral mucous moist, oropharynx benign. Neck supple,    Resp:  CTA bilaterally. No wheezing/rhonchi/rales. No accessory muscle use   CV:  Regular rhythm, normal rate, no murmurs, gallops, rubs    GI:  Soft, gravid uterus, abdomen non tender. normoactive bowel sounds, no hepatosplenomegaly     Musculoskeletal:  No edema    Neurologic:  Moves all extremities.   AAOx3, CN II-XII reviewed     Skin:  Good turgor, no rashes or ulcers       Data Review:    Review and/or order of clinical lab test      Labs:     Recent Labs      07/22/17   0640  07/20/17   0521   WBC  9.8  10.9   HGB  10.9*  11.6   HCT  34.6*  36.0   PLT  140*  143*     Recent Labs      07/22/17   0640  07/20/17   0521   NA  138  134*   K  3.7  3.8   CL  106  102   CO2  24  23   BUN  10  10   CREA  0.36*  0.36*   GLU  153*  151*   CA  8.0*  8.6     Recent Labs      07/20/17   0521   SGOT  8*   ALT  9*   AP  46   TBILI  0.3   TP  6.5   ALB  2.3*   GLOB  4.2*     No results for input(s): INR, PTP, APTT in the last 72 hours.    No lab exists for component: INREXT, INREXT   No results for input(s): FE, TIBC, PSAT, FERR in the last 72 hours. No results found for: FOL, RBCF   No results for input(s): PH, PCO2, PO2 in the last 72 hours. No results for input(s): CPK, CKNDX, TROIQ in the last 72 hours. No lab exists for component: CPKMB  Lab Results   Component Value Date/Time    Cholesterol, total 246 11/16/2016 08:39 AM    HDL Cholesterol 43 11/16/2016 08:39 AM    LDL, calculated 158 11/16/2016 08:39 AM    Triglyceride 227 11/16/2016 08:39 AM    CHOL/HDL Ratio 7.0 06/13/2013 02:50 PM     Lab Results   Component Value Date/Time    Glucose (POC) 140 07/22/2017 06:49 AM    Glucose (POC) 140 07/21/2017 08:30 PM    Glucose (POC) 99 07/21/2017 03:14 PM    Glucose (POC) 140 07/21/2017 09:34 AM    Glucose (POC) 122 07/21/2017 06:46 AM     Lab Results   Component Value Date/Time    Color YELLOW/STRAW 04/28/2017 08:02 PM    Appearance CLEAR 04/28/2017 08:02 PM    Specific gravity 1.029 04/28/2017 08:02 PM    pH (UA) 7.0 04/28/2017 08:02 PM    Protein NEGATIVE  04/28/2017 08:02 PM    Glucose >1000 04/28/2017 08:02 PM    Ketone NEGATIVE  04/28/2017 08:02 PM    Bilirubin NEGATIVE  04/28/2017 08:02 PM    Urobilinogen 1.0 04/28/2017 08:02 PM    Nitrites NEGATIVE  04/28/2017 08:02 PM    Leukocyte Esterase NEGATIVE  04/28/2017 08:02 PM    Epithelial cells MANY 04/28/2017 08:02 PM    Bacteria 1+ 04/28/2017 08:02 PM    WBC 0-4 04/28/2017 08:02 PM    RBC 0-5 04/28/2017 08:02 PM         Medications Reviewed:     Current Facility-Administered Medications   Medication Dose Route Frequency    insulin glargine (LANTUS) inj 35 Units (Syringe 2 of 2. Total dose = 65 units)  35 Units SubCUTAneous QHS    And    insulin glargine (LANTUS) inj 30 Units (Syringe 1 of 2.  Total dose = 65 units)  30 Units SubCUTAneous QHS    insulin lispro (HUMALOG) injection 65 Units  65 Units SubCUTAneous TIDAC    insulin lispro (HUMALOG) injection   SubCUTAneous TIDPC    diph,Pertuss(AC),Tet Vac-PF (BOOSTRIX) suspension 0.5 mL  0.5 mL IntraMUSCular PRIOR TO DISCHARGE    acetaminophen (TYLENOL) tablet 650 mg  650 mg Oral Q4H PRN    prenatal vit-iron fumarate-fa (PRENATAL PLUS with IRON) tablet 1 Tab  1 Tab Oral DAILY    sodium chloride (NS) flush 5-10 mL  5-10 mL IntraVENous Q8H    sodium chloride (NS) flush 5-10 mL  5-10 mL IntraVENous PRN    diphenhydrAMINE (BENADRYL) capsule 25 mg  25 mg Oral Q4H PRN    zolpidem (AMBIEN) tablet 5 mg  5 mg Oral QHS PRN    famotidine (PEPCID) tablet 20 mg  20 mg Oral Q12H PRN    glucose chewable tablet 16 g  4 Tab Oral PRN    glucagon (GLUCAGEN) injection 1 mg  1 mg IntraMUSCular PRN    dextrose 10 % infusion 125-250 mL  125-250 mL IntraVENous PRN     ______________________________________________________________________  EXPECTED LENGTH OF STAY: - - -  ACTUAL LENGTH OF STAY:          4                 Wade Seals MD

## 2017-07-22 NOTE — PROGRESS NOTES
High Risk Obstetrics Progress Note    Name: Mihaela Rosa MRN: [id number]  SSN: [id number]    YOB: 1980  Age: 31 y.o. Sex: female      Subjective:      LOS: 4 days    Estimated Date of Delivery: 10/17/17   Gestational Age Today: 29w1d     Patient admitted for glucose control in pregnancy. She was seen and examined this morning - reports doing well overall. She does admit to numbness in her right hip that occurs when she wakes up at night, no difficulties ambulating to restroom. She denies HA, vision changes, CP, SOB, N/V, fever/chills or leg edema bilaterally. Good FM. No LOF, VB or contractions. Objective:     Vitals:  Blood pressure 119/69, pulse 98, temperature 98 °F (36.7 °C), resp. rate 18, height 5' 1\" (1.549 m), weight 86.3 kg (190 lb 4 oz), last menstrual period 01/10/2017, not currently breastfeeding. Temp (24hrs), Av.3 °F (36.8 °C), Min:97.9 °F (36.6 °C), Max:99.1 °F (43.1 °C)    Systolic (35HQV), RMX:931 , Min:119 , SGV:992      Diastolic (11NSI), DRS:30, Min:69, Max:85       Intake and Output:          Physical Exam:  Patient without distress.   Heart: Regular rate and rhythm  Lung: normal respiratory effort  Abdomen: soft, obese, nontender  Fundus: soft and non tender  Lower Extremities: no edema in LE bilaterally       Membranes:  Intact    Uterine Activity:  None    Fetal Heart Rate:  Reactive NST  - baseline 145, moderate variability, +accels        Labs:   Recent Results (from the past 36 hour(s))   GLUCOSE, POC    Collection Time: 17  2:51 AM   Result Value Ref Range    Glucose (POC) 107 (H) 65 - 100 mg/dL    Performed by [employer]    GLUCOSE, POC    Collection Time: 17  6:46 AM   Result Value Ref Range    Glucose (POC) 122 (H) 65 - 100 mg/dL    Performed by [employer]    GLUCOSE, POC    Collection Time: 17  9:34 AM   Result Value Ref Range    Glucose (POC) 140 (H) 65 - 100 mg/dL    Performed by Coreen Bansal POC    Collection Time: 07/21/17  3:14 PM   Result Value Ref Range    Glucose (POC) 99 65 - 100 mg/dL    Performed by BLUNT DUNCAN    GLUCOSE, POC    Collection Time: 07/21/17  8:30 PM   Result Value Ref Range    Glucose (POC) 140 (H) 65 - 100 mg/dL    Performed by Mona Wilson    CBC W/O DIFF    Collection Time: 07/22/17  6:40 AM   Result Value Ref Range    WBC 9.8 3.6 - 11.0 K/uL    RBC 4.16 3.80 - 5.20 M/uL    HGB 10.9 (L) 11.5 - 16.0 g/dL    HCT 34.6 (L) 35.0 - 47.0 %    MCV 83.2 80.0 - 99.0 FL    MCH 26.2 26.0 - 34.0 PG    MCHC 31.5 30.0 - 36.5 g/dL    RDW 14.8 (H) 11.5 - 14.5 %    PLATELET 759 (L) 129 - 644 K/uL   METABOLIC PANEL, BASIC    Collection Time: 07/22/17  6:40 AM   Result Value Ref Range    Sodium 138 136 - 145 mmol/L    Potassium 3.7 3.5 - 5.1 mmol/L    Chloride 106 97 - 108 mmol/L    CO2 24 21 - 32 mmol/L    Anion gap 8 5 - 15 mmol/L    Glucose 153 (H) 65 - 100 mg/dL    BUN 10 6 - 20 MG/DL    Creatinine 0.36 (L) 0.55 - 1.02 MG/DL    BUN/Creatinine ratio 28 (H) 12 - 20      GFR est AA >60 >60 ml/min/1.73m2    GFR est non-AA >60 >60 ml/min/1.73m2    Calcium 8.0 (L) 8.5 - 10.1 MG/DL   GLUCOSE, POC    Collection Time: 07/22/17  6:49 AM   Result Value Ref Range    Glucose (POC) 140 (H) 65 - 100 mg/dL    Performed by Smith Dye        Assessment and Plan:      Principal Problem:    Uncontrolled diabetes mellitus ([address] (7/18/2017)    Active Problems:    Diabetes in pregnancy (7/18/2017)       Diabetes-Type 2 in the setting of pregnancy, IUP at 27w4d  - Outpatient Endocrinologist is Dr. Dirk Carbajal - appreciate recs. As she remains well above pregnancy glucose goals, Lantus increased to 65u qHs, Humalog to 65 before meals. Continue SSI to cover postprandial and fasting BG.  - Glucose stabilizer discussed, but not felt to be indicated per IM hospitalist  - Diabetes education -continue to follow with nutrition counselor with DM education center. Reports she is becoming more comfortable with food choices.   - Fetal ultrasound completed 7/18 with MFM- all WNL. NST BID. - Appreciate input on pt's care from Hospitalist, Endocrinologist, and MFM. - Continue care on antepartum unit at this time  - H/o 2 prior c-sections.     - Working with social work to help pt obtain resources at home as pt has many barriers to health care - may have to keep inpatient until Monday as blood sugars are still not well-controlled and  still working on providing resources; pt aware and agreeable to plan     Signed By: Vineet Amato DO     July 22, 2017

## 2017-07-23 LAB
ERYTHROCYTE [DISTWIDTH] IN BLOOD BY AUTOMATED COUNT: 14.7 % (ref 11.5–14.5)
GLUCOSE BLD STRIP.AUTO-MCNC: 111 MG/DL (ref 65–100)
GLUCOSE BLD STRIP.AUTO-MCNC: 120 MG/DL (ref 65–100)
GLUCOSE BLD STRIP.AUTO-MCNC: 131 MG/DL (ref 65–100)
GLUCOSE BLD STRIP.AUTO-MCNC: 150 MG/DL (ref 65–100)
GLUCOSE BLD STRIP.AUTO-MCNC: 179 MG/DL (ref 65–100)
GLUCOSE BLD STRIP.AUTO-MCNC: 70 MG/DL (ref 65–100)
GLUCOSE BLD STRIP.AUTO-MCNC: 89 MG/DL (ref 65–100)
GLUCOSE BLD STRIP.AUTO-MCNC: 96 MG/DL (ref 65–100)
HCT VFR BLD AUTO: 33.3 % (ref 35–47)
HGB BLD-MCNC: 10.8 G/DL (ref 11.5–16)
MCH RBC QN AUTO: 26.5 PG (ref 26–34)
MCHC RBC AUTO-ENTMCNC: 32.4 G/DL (ref 30–36.5)
MCV RBC AUTO: 81.6 FL (ref 80–99)
PLATELET # BLD AUTO: 146 K/UL (ref 150–400)
RBC # BLD AUTO: 4.08 M/UL (ref 3.8–5.2)
SERVICE CMNT-IMP: ABNORMAL
SERVICE CMNT-IMP: NORMAL
WBC # BLD AUTO: 9.9 K/UL (ref 3.6–11)

## 2017-07-23 PROCEDURE — 59025 FETAL NON-STRESS TEST: CPT

## 2017-07-23 PROCEDURE — 65410000002 HC RM PRIVATE OB

## 2017-07-23 PROCEDURE — 85027 COMPLETE CBC AUTOMATED: CPT | Performed by: HOSPITALIST

## 2017-07-23 PROCEDURE — 82962 GLUCOSE BLOOD TEST: CPT

## 2017-07-23 PROCEDURE — 74011636637 HC RX REV CODE- 636/637: Performed by: HOSPITALIST

## 2017-07-23 PROCEDURE — 74011636637 HC RX REV CODE- 636/637: Performed by: OBSTETRICS & GYNECOLOGY

## 2017-07-23 PROCEDURE — 36415 COLL VENOUS BLD VENIPUNCTURE: CPT | Performed by: HOSPITALIST

## 2017-07-23 PROCEDURE — 74011250637 HC RX REV CODE- 250/637: Performed by: OBSTETRICS & GYNECOLOGY

## 2017-07-23 RX ORDER — INSULIN LISPRO 100 [IU]/ML
60 INJECTION, SOLUTION INTRAVENOUS; SUBCUTANEOUS ONCE
Status: COMPLETED | OUTPATIENT
Start: 2017-07-23 | End: 2017-07-23

## 2017-07-23 RX ORDER — INSULIN LISPRO 100 [IU]/ML
60 INJECTION, SOLUTION INTRAVENOUS; SUBCUTANEOUS
Status: DISCONTINUED | OUTPATIENT
Start: 2017-07-24 | End: 2017-07-24 | Stop reason: HOSPADM

## 2017-07-23 RX ADMIN — INSULIN GLARGINE 35 UNITS: 100 INJECTION, SOLUTION SUBCUTANEOUS at 22:30

## 2017-07-23 RX ADMIN — Medication 10 ML: at 06:28

## 2017-07-23 RX ADMIN — FAMOTIDINE 20 MG: 20 TABLET ORAL at 22:35

## 2017-07-23 RX ADMIN — Medication 1 TABLET: at 08:58

## 2017-07-23 RX ADMIN — INSULIN LISPRO 3 UNITS: 100 INJECTION, SOLUTION INTRAVENOUS; SUBCUTANEOUS at 11:26

## 2017-07-23 RX ADMIN — ACETAMINOPHEN 650 MG: 325 TABLET, FILM COATED ORAL at 18:49

## 2017-07-23 RX ADMIN — INSULIN LISPRO 60 UNITS: 100 INJECTION, SOLUTION INTRAVENOUS; SUBCUTANEOUS at 18:58

## 2017-07-23 RX ADMIN — INSULIN GLARGINE 30 UNITS: 100 INJECTION, SOLUTION SUBCUTANEOUS at 22:29

## 2017-07-23 RX ADMIN — INSULIN LISPRO 65 UNITS: 100 INJECTION, SOLUTION INTRAVENOUS; SUBCUTANEOUS at 08:58

## 2017-07-23 RX ADMIN — INSULIN LISPRO 65 UNITS: 100 INJECTION, SOLUTION INTRAVENOUS; SUBCUTANEOUS at 13:52

## 2017-07-23 NOTE — PROGRESS NOTES
High Risk Obstetrics Progress Note    Name: Wilfrid Mitchell MRN: [id number]  SSN: [id number]    YOB: 1980  Age: 31 y.o. Sex: female      Subjective:      LOS: 5 days    Estimated Date of Delivery: 10/17/17   Gestational Age Today: 33w11d      Patient admitted for glucose control in pregnancy. She was seen and examined this morning - reports doing well overall. She denies new complaints or issues this morning. She denies HA, vision changes, CP, SOB, N/V, fever/chills or leg edema bilaterally. Good FM. No LOF, VB or contractions. Objective:     Vitals:  Blood pressure 128/65, pulse (!) 102, temperature 97.8 °F (36.6 °C), resp. rate 18, height 5' 1\" (1.549 m), weight 86.3 kg (190 lb 4 oz), last menstrual period 01/10/2017, not currently breastfeeding. Temp (24hrs), Av.1 °F (36.7 °C), Min:97.8 °F (36.6 °C), Max:98.6 °F (37 °C)    Systolic (36BYM), CVN:553 , Min:117 , HFX:443      Diastolic (01DSA), BZS:58, Min:63, Max:82       Intake and Output:          Physical Exam:  Patient without distress.   Heart: Regular rate and rhythm, no murmur  Lung: clear to auscultation throughout lung fields, no wheezes, no rales, no rhonchi and normal respiratory effort  Abdomen: soft, obese, nontender  Fundus: soft and non tender, appropriate for gestational age  Lower Extremities: no edema bilaterally       Membranes:  Intact    Uterine Activity:  None    Fetal Heart Rate:  NST pending for today; last monitoring  in the evening = Baseline 145, moderate variability, reassuring, appropriate for gestational age      Labs:   Recent Results (from the past 36 hour(s))   CBC W/O DIFF    Collection Time: 17  6:40 AM   Result Value Ref Range    WBC 9.8 3.6 - 11.0 K/uL    RBC 4.16 3.80 - 5.20 M/uL    HGB 10.9 (L) 11.5 - 16.0 g/dL    HCT 34.6 (L) 35.0 - 47.0 %    MCV 83.2 80.0 - 99.0 FL    MCH 26.2 26.0 - 34.0 PG    MCHC 31.5 30.0 - 36.5 g/dL    RDW 14.8 (H) 11.5 - 14.5 %    PLATELET 499 (L) 501 - 400 K/uL METABOLIC PANEL, BASIC    Collection Time: 07/22/17  6:40 AM   Result Value Ref Range    Sodium 138 136 - 145 mmol/L    Potassium 3.7 3.5 - 5.1 mmol/L    Chloride 106 97 - 108 mmol/L    CO2 24 21 - 32 mmol/L    Anion gap 8 5 - 15 mmol/L    Glucose 153 (H) 65 - 100 mg/dL    BUN 10 6 - 20 MG/DL    Creatinine 0.36 (L) 0.55 - 1.02 MG/DL    BUN/Creatinine ratio 28 (H) 12 - 20      GFR est AA >60 >60 ml/min/1.73m2    GFR est non-AA >60 >60 ml/min/1.73m2    Calcium 8.0 (L) 8.5 - 10.1 MG/DL   GLUCOSE, POC    Collection Time: 07/22/17  6:49 AM   Result Value Ref Range    Glucose (POC) 140 (H) 65 - 100 mg/dL    Performed by Conor Garcia    GLUCOSE, POC    Collection Time: 07/22/17 10:28 AM   Result Value Ref Range    Glucose (POC) 118 (H) 65 - 100 mg/dL    Performed by Coreen Bansal, POC    Collection Time: 07/22/17 12:19 PM   Result Value Ref Range    Glucose (POC) 133 (H) 65 - 100 mg/dL    Performed by [facility] Bansal, POC    Collection Time: 07/22/17  3:03 PM   Result Value Ref Range    Glucose (POC) 189 (H) 65 - 100 mg/dL    Performed by [facility] [address] [place], POC    Collection Time: 07/22/17  5:57 PM   Result Value Ref Range    Glucose (POC) 107 (H) 65 - 100 mg/dL    Performed by [facility], POC    Collection Time: 07/22/17  8:30 PM   Result Value Ref Range    Glucose (POC) 74 65 - 100 mg/dL    Performed by [facility], POC    Collection Time: 07/22/17 10:01 PM   Result Value Ref Range    Glucose (POC) 121 (H) 65 - 100 mg/dL    Performed by Jerica Cruz    GLUCOSE, POC    Collection Time: 07/23/17  6:37 AM   Result Value Ref Range    Glucose (POC) 120 (H) 65 - 100 mg/dL    Performed by Jerica Cruz    CBC W/O DIFF    Collection Time: 07/23/17  6:40 AM   Result Value Ref Range    WBC 9.9 3.6 - 11.0 K/uL    RBC 4.08 3.80 - 5.20 M/uL    HGB 10.8 (L) 11.5 - 16.0 g/dL    HCT 33.3 (L) 35.0 - 47.0 %    MCV 81.6 80.0 - 99.0 FL    MCH 26.5 26.0 - 34.0 PG MCHC 32.4 30.0 - 36.5 g/dL    RDW 14.7 (H) 11.5 - 14.5 %    PLATELET 382 (L) 219 - 400 K/uL       Assessment and Plan:      Principal Problem:    Uncontrolled diabetes mellitus ([address] (7/18/2017)    Active Problems:    Diabetes in pregnancy (7/18/2017)       Diabetes-Type 2 in the setting of pregnancy, IUP at 27w5d  - Outpatient Endocrinologist is Dr. Mary Diaz - appreciate Bernardino Good. Pt remains on Lantus 65u qHs, Humalog  65u before meals with SSI covering postprandial and fasting BG. Per IM hospitalist, may consider discharge home on Monday, 7/24 if finger stick glucose stays in 120 range  - Diabetes education -continue to follow with nutrition counselor with DM education center.  Reports she is becoming more comfortable with food choices. - Fetal ultrasound completed 7/18 with MFM- all WNL. NST BID. - Appreciate input on pt's care from Hospitalist, Endocrinologist, and MFM. - Continue care on antepartum unit at this time  - H/o 2 prior c-sections.    - Working with social work to help pt obtain resources at home as pt has many barriers to health care - plan to keep inpatient  per IM recommendations as blood sugars are still not well-controlled and  still working on providing resources; pt aware and agreeable to plan     Spoke with pt's  at length on the phone this morning about the importance of staying in the hospital - he voiced understanding and is also agreeable to plan.      Signed By: Bossman Acuna DO     July 23, 2017

## 2017-07-23 NOTE — PROGRESS NOTES
Problem: Diabetes Self-Management  Goal: *Patient Specific Goal (EDIT GOAL, INSERT TEXT)  Outcome: Progressing Towards Goal  Patient will pick correct carb amounts for each meal and snack

## 2017-07-23 NOTE — PROGRESS NOTES
Hospitalist Progress Note  Yoselin Acosta MD  Office: [phone]  Cell: [phone]      Date of Service:  2017  NAME:  Mihaela Rosa  :  1980  MRN:  [id number]      Admission Summary: This is a 62-year-old G4, P2, 32 weeks pregnant female with a past medical history of diabetes   mellitus type 2. She is being followed by Dr. Joshua Ayala for her diabetes Last seen by him about a week ago and today, the patient was seen by Dr. Juancho Rothman, who is the [facility] OB[facility]GYN for the patient. Both Dr. Joshua Ayala as well as Dr. Donnie Valdez had concerns about her uncontrolled blood sugars and that is why the patient was sent to [facility] [address] [place] [address] for blood sugar management. The patient checks her blood sugars 6 times daily and her usual blood sugars are fasting between 180 to 240 and post meal, her blood sugars are 180 to 220 and she takes Levemir 60 units every night (which was increased about 1 week ago from 50 units). Also, she takes Humalog 80 units with meals and checks her blood sugar after meals and if it is more than 200, then she gives herself 20 more units. She claims that occasionally she would have low blood sugars and she has had low blood sugars as low as 45 (last one month she had atleast 5 hypoglycemic episodes).      Interval history / Subjective:   She said she felt dizzy last night, this morning feels generalized weakness, no abdominal pain      Assessment & Plan:     DM-II poorly controlled   -A1c 7.5  -finger stick glucose improving, has also reading in 70, felt dizzy around 8 pm and eat fruit and banana, cut back her pre meal lispro to 60 units, continue lantus 65 units and sliding scale and 2 hours post prandial, and will adjust further depend on her reading, may consider discharge tomorrow if her finger stick glucose stable  -discussed with Dr Willie Dai on  and noted his recommendation    IUP GA 27 weeks 5 days  -management per ObGyn   -no abdominal pain  -appreciate endocrinologist input    Mild hyponatremia   -repeated and resolved    Thrombocytopenia unclear etiology  -improving, repeat cbc in am    Code status: Full  Code  DVT prophylaxis:SCD    Care Plan discussed with: Patient/Family, Nurse and   Disposition: home with possible home health     Hospital Problems  Date Reviewed: 7/18/2017          Codes Class Noted POA    * (Principal)Uncontrolled diabetes mellitus ([facility] [address] ICD-10-CM: E11.65  ICD-9-CM: 250.02  7/18/2017 Yes        Diabetes in pregnancy ICD-10-CM: O24.919  ICD-9-CM: 648.00  7/18/2017 Unknown              Vital Signs:    Last 24hrs VS reviewed since prior progress note. Most recent are:  Visit Vitals    /67 (BP 1 Location: Right arm, BP Patient Position: At rest)    Pulse (!) 102    Temp 97.8 °F (36.6 °C)    Resp 18    Ht 5' 1\" (1.549 m)    Wt 86.3 kg (190 lb 4 oz)    Breastfeeding No    BMI 35.95 kg/m2       No intake or output data in the 24 hours ending 07/23/17 0834     Physical Examination:             Constitutional:  No acute distress, cooperative, pleasant    ENT:  Oral mucous moist, oropharynx benign. Neck supple,    Resp:  CTA bilaterally. No wheezing/rhonchi/rales. No accessory muscle use   CV:  Regular rhythm, normal rate, no murmurs, gallops, rubs    GI:  Soft, gravid uterus, abdomen non tender. normoactive bowel sounds, no hepatosplenomegaly     Musculoskeletal:  No edema    Neurologic:  Moves all extremities.   AAOx3, CN II-XII reviewed     Skin:  Good turgor, no rashes or ulcers       Data Review:    Review and/or order of clinical lab test      Labs:     Recent Labs      07/23/17   0640  07/22/17   0640   WBC  9.9  9.8   HGB  10.8*  10.9*   HCT  33.3*  34.6*   PLT  146*  140*     Recent Labs      07/22/17   0640   NA  138   K  3.7   CL  106   CO2  24   BUN  10   CREA  0.36*   GLU  153*   CA  8.0*     No results for input(s): SGOT, GPT, ALT, AP, TBIL, TBILI, TP, ALB, GLOB, GGT, AML, LPSE in the last 72 hours. No lab exists for component: AMYP, HLPSE  No results for input(s): INR, PTP, APTT in the last 72 hours. No lab exists for component: INREXT, INREXT   No results for input(s): FE, TIBC, PSAT, FERR in the last 72 hours. No results found for: FOL, RBCF   No results for input(s): PH, PCO2, PO2 in the last 72 hours. No results for input(s): CPK, CKNDX, TROIQ in the last 72 hours. No lab exists for component: CPKMB  Lab Results   Component Value Date/Time    Cholesterol, total 246 11/16/2016 08:39 AM    HDL Cholesterol 43 11/16/2016 08:39 AM    LDL, calculated 158 11/16/2016 08:39 AM    Triglyceride 227 11/16/2016 08:39 AM    CHOL/HDL Ratio 7.0 06/13/2013 02:50 PM     Lab Results   Component Value Date/Time    Glucose (POC) 120 07/23/2017 06:37 AM    Glucose (POC) 121 07/22/2017 10:01 PM    Glucose (POC) 74 07/22/2017 08:30 PM    Glucose (POC) 107 07/22/2017 05:57 PM    Glucose (POC) 189 07/22/2017 03:03 PM     Lab Results   Component Value Date/Time    Color YELLOW/STRAW 04/28/2017 08:02 PM    Appearance CLEAR 04/28/2017 08:02 PM    Specific gravity 1.029 04/28/2017 08:02 PM    pH (UA) 7.0 04/28/2017 08:02 PM    Protein NEGATIVE  04/28/2017 08:02 PM    Glucose >1000 04/28/2017 08:02 PM    Ketone NEGATIVE  04/28/2017 08:02 PM    Bilirubin NEGATIVE  04/28/2017 08:02 PM    Urobilinogen 1.0 04/28/2017 08:02 PM    Nitrites NEGATIVE  04/28/2017 08:02 PM    Leukocyte Esterase NEGATIVE  04/28/2017 08:02 PM    Epithelial cells MANY 04/28/2017 08:02 PM    Bacteria 1+ 04/28/2017 08:02 PM    WBC 0-4 04/28/2017 08:02 PM    RBC 0-5 04/28/2017 08:02 PM         Medications Reviewed:     Current Facility-Administered Medications   Medication Dose Route Frequency    insulin lispro (HUMALOG) injection   SubCUTAneous PRN    insulin glargine (LANTUS) inj 35 Units (Syringe 2 of 2. Total dose = 65 units)  35 Units SubCUTAneous QHS    And    insulin glargine (LANTUS) inj 30 Units (Syringe 1 of 2.  Total dose = 65 units)  30 Units SubCUTAneous QHS    insulin lispro (HUMALOG) injection 65 Units  65 Units SubCUTAneous TIDAC    diph,Pertuss(AC),Tet Vac-PF (BOOSTRIX) suspension 0.5 mL  0.5 mL IntraMUSCular PRIOR TO DISCHARGE    acetaminophen (TYLENOL) tablet 650 mg  650 mg Oral Q4H PRN    prenatal vit-iron fumarate-fa (PRENATAL PLUS with IRON) tablet 1 Tab  1 Tab Oral DAILY    sodium chloride (NS) flush 5-10 mL  5-10 mL IntraVENous Q8H    sodium chloride (NS) flush 5-10 mL  5-10 mL IntraVENous PRN    diphenhydrAMINE (BENADRYL) capsule 25 mg  25 mg Oral Q4H PRN    zolpidem (AMBIEN) tablet 5 mg  5 mg Oral QHS PRN    famotidine (PEPCID) tablet 20 mg  20 mg Oral Q12H PRN    glucose chewable tablet 16 g  4 Tab Oral PRN    glucagon (GLUCAGEN) injection 1 mg  1 mg IntraMUSCular PRN    dextrose 10 % infusion 125-250 mL  125-250 mL IntraVENous PRN     ______________________________________________________________________  EXPECTED LENGTH OF STAY: - - -  ACTUAL LENGTH OF STAY:          5                 Wade Santizo MD

## 2017-07-23 NOTE — PROGRESS NOTES
Bedside and Verbal shift change report given to BRITTANY Hughes RN (oncoming nurse) by ELE Carbone (offgoing nurse). Report included the following information SBAR, MAR and Recent Results. 1350  Patient awakened from 1 1/2 hr nap. Ready for lunch. BS 70. Patient is asymptomatic. 1419  Patient not \"feeling well\". No HA, dizziness, or shakiness. \"Feels hot\". BS 89. Patient states she \"needs to eat more. \"  Patient offered cheese cubes, 1/2 cup 2% milk, and 1/2 peach (patient insisted on peach). Patient is alert, oriented. Pleading for fruit. 1447  . \"feeling better, I just needed to eat\". Explained to patient that her BS was increasing. Instructed to eat some protein along with carbs when eating. Discussed that just eating fruit by itself is not good idea when feeling \"bad\". Patient is alert, oriented. Looks defeated. 1630  Spoke to Dr. Gloria Nova concerning BS and insulin. He will write order to decrease AC insulin.

## 2017-07-24 ENCOUNTER — TELEPHONE (OUTPATIENT)
Dept: ENDOCRINOLOGY | Age: 37
End: 2017-07-24

## 2017-07-24 VITALS
BODY MASS INDEX: 35.92 KG/M2 | HEART RATE: 96 BPM | TEMPERATURE: 97.7 F | SYSTOLIC BLOOD PRESSURE: 113 MMHG | RESPIRATION RATE: 16 BRPM | WEIGHT: 190.25 LBS | DIASTOLIC BLOOD PRESSURE: 72 MMHG | HEIGHT: 61 IN

## 2017-07-24 LAB
GLUCOSE BLD STRIP.AUTO-MCNC: 130 MG/DL (ref 65–100)
GLUCOSE BLD STRIP.AUTO-MCNC: 149 MG/DL (ref 65–100)
GLUCOSE BLD STRIP.AUTO-MCNC: 161 MG/DL (ref 65–100)
SERVICE CMNT-IMP: ABNORMAL

## 2017-07-24 PROCEDURE — 74011250636 HC RX REV CODE- 250/636: Performed by: OBSTETRICS & GYNECOLOGY

## 2017-07-24 PROCEDURE — 90715 TDAP VACCINE 7 YRS/> IM: CPT | Performed by: OBSTETRICS & GYNECOLOGY

## 2017-07-24 PROCEDURE — 74011250637 HC RX REV CODE- 250/637: Performed by: OBSTETRICS & GYNECOLOGY

## 2017-07-24 PROCEDURE — 74011636637 HC RX REV CODE- 636/637: Performed by: HOSPITALIST

## 2017-07-24 PROCEDURE — 82962 GLUCOSE BLOOD TEST: CPT

## 2017-07-24 RX ORDER — INSULIN GLARGINE 100 [IU]/ML
INJECTION, SOLUTION SUBCUTANEOUS
Qty: 15 PEN | Refills: 3 | Status: SHIPPED | OUTPATIENT
Start: 2017-07-24 | End: 2017-08-01 | Stop reason: SDUPTHER

## 2017-07-24 RX ORDER — FAMOTIDINE 20 MG/1
20 TABLET, FILM COATED ORAL
Qty: 20 TAB | Refills: 0 | Status: SHIPPED | OUTPATIENT
Start: 2017-07-24 | End: 2017-09-26 | Stop reason: ALTCHOICE

## 2017-07-24 RX ORDER — INSULIN LISPRO 100 [IU]/ML
60 INJECTION, SOLUTION INTRAVENOUS; SUBCUTANEOUS
Qty: 6 PACKAGE | Refills: 3 | Status: SHIPPED | OUTPATIENT
Start: 2017-07-24 | End: 2017-08-01 | Stop reason: SDUPTHER

## 2017-07-24 RX ADMIN — INSULIN LISPRO 60 UNITS: 100 INJECTION, SOLUTION INTRAVENOUS; SUBCUTANEOUS at 12:55

## 2017-07-24 RX ADMIN — INSULIN LISPRO 3 UNITS: 100 INJECTION, SOLUTION INTRAVENOUS; SUBCUTANEOUS at 08:28

## 2017-07-24 RX ADMIN — INSULIN LISPRO 3 UNITS: 100 INJECTION, SOLUTION INTRAVENOUS; SUBCUTANEOUS at 12:57

## 2017-07-24 RX ADMIN — TETANUS TOXOID, REDUCED DIPHTHERIA TOXOID AND ACELLULAR PERTUSSIS VACCINE, ADSORBED 0.5 ML: 5; 2.5; 8; 8; 2.5 SUSPENSION INTRAMUSCULAR at 13:29

## 2017-07-24 RX ADMIN — FAMOTIDINE 20 MG: 20 TABLET ORAL at 09:33

## 2017-07-24 RX ADMIN — Medication 1 TABLET: at 09:33

## 2017-07-24 RX ADMIN — INSULIN LISPRO 60 UNITS: 100 INJECTION, SOLUTION INTRAVENOUS; SUBCUTANEOUS at 08:27

## 2017-07-24 NOTE — PROGRESS NOTES
CM noted consult for home health skilled nursing visits. With no preference, CM sent referral to [facility] - INPATIENT for review. Received call from RN liaison stating that pt does not qualify for home visits because she is not home bound. CM informed pt of this- Pt still inquiring about increasing assistance at home to care for her children. CM clarified that pt has already tried to apply for private duty nursing through , however was denied because the child does not have any nursing needs. Pt stated her needs are all behavioral and that she just needs . \"I'm too busy with all of this right now, I need some help taking care of her. \" CM confirmed that the child is receiving services through the [facility] (Early Intervention). She also goes to the [facility] once a week to participate in a feeding program.  CM encouraged pt to reach out to [facility] to establish increased care/treatment program for the child. CM also advised her to contact  again to inquire about  services she may qualify for. Pt is to contact her child's pediatrician office for further assistance. Since pt does not qualify for home health skilled nursing, Perry Canela will be monitoring for prenatal support/nutritional resources. Pt will also [facility] and her endocrinologist/nurse navigator. No barriers to discharge identified. Pt is driving herself home today. CM provided opportunity for questions/concerns. None voiced at this time.      RAFI Reardon

## 2017-07-24 NOTE — PROGRESS NOTES
Bedside shift change report given to PARIS Webster RN (oncoming nurse) by Nano Gottlieb. Kristi Mcbride RN (offgoing nurse). Report included the following information SBAR, MAR, Accordion and Recent Results.

## 2017-07-24 NOTE — TELEPHONE ENCOUNTER
Spoke with Dr. Ilda Smith who wanted recommendations before discharging Ms Ronny Mckeon home. Pt is currently taking Lantus 65 units daily and Humalog 60 units with each meal.  Her Humalog dose was decreased from 65 units to 60 units because she was having low BGs in the 70's. I recommended she be sent home on the Lantus 65 units and Humalog 60 units with each meal.  Pt to keep her follow up appointment with Dr. Rodrigo Castillo on 8/1/17.

## 2017-07-24 NOTE — TELEPHONE ENCOUNTER
Dr. Rosina Romero with [facility] is calling in reference to Dr Bass Standing patient Jessica Patterson.  1980. It is regarding her discharge. Dr. Deborah Judge contact [phone].     Thanks  Alberto Liao

## 2017-07-24 NOTE — PROGRESS NOTES
Hospitalist Progress Note  Linnie Apley, MD  Office: [phone]  Cell: [phone]      Date of Service:  2017  NAME:  Kelli Han  :  1980  MRN:  [id number]      Admission Summary: This is a 65-year-old G4, P2, 32 weeks pregnant female with a past medical history of diabetes   mellitus type 2. She is being followed by Dr. Rodrigo Castillo for her diabetes Last seen by him about a week ago and today, the patient was seen by Dr. Piyush Moreau, who is the [facility] OB[facility]GYN for the patient. Both Dr. Rodrigo Castillo as well as Dr. Ramos Begum had concerns about her uncontrolled blood sugars and that is why the patient was sent to [facility] for blood sugar management. The patient checks her blood sugars 6 times daily and her usual blood sugars are fasting between 180 to 240 and post meal, her blood sugars are 180 to 220 and she takes Levemir 60 units every night (which was increased about 1 week ago from 50 units). Also, she takes Humalog 80 units with meals and checks her blood sugar after meals and if it is more than 200, then she gives herself 20 more units. She claims that occasionally she would have low blood sugars and she has had low blood sugars as low as 45 (last one month she had atleast 5 hypoglycemic episodes).      Interval history / Subjective:   She said she felt dizzy last night, this morning feels generalized weakness, no abdominal pain      Assessment & Plan:     DM-II in a pregnancy poorly controlled   -A1c 7.5  -finger stick glucose improving range 70-, has also reading in 70 - 161/ 24 hrs, feels better,  continue lantus 65 units, lispro 60 units premeal, sliding scale and 2 hours post prandial,   --appreciate endocrinologist input, outpatient follow up with Endocrinologist  -DTC on board    IUP GA 27 weeks 5 days  -management per ObGyn   -no abdominal pain    Mild hyponatremia   -repeated and resolved    Thrombocytopenia unclear etiology  -improving, repeat cbc in am    Code status: Full  Code  DVT prophylaxis:SCD    Care Plan discussed with: Patient/Family, Nurse and   Disposition: home with possible home health     Hospital Problems  Date Reviewed: 7/18/2017          Codes Class Noted POA    * (Principal)Uncontrolled diabetes mellitus ([facility] [address] ICD-10-CM: E11.65  ICD-9-CM: 250.02  7/18/2017 Yes        Diabetes in pregnancy ICD-10-CM: O24.919  ICD-9-CM: 648.00  7/18/2017 Unknown              Vital Signs:    Last 24hrs VS reviewed since prior progress note. Most recent are:  Visit Vitals    /61 (BP 1 Location: Right arm, BP Patient Position: At rest)    Pulse 94    Temp 98.2 °F (36.8 °C)    Resp 16    Ht 5' 1\" (1.549 m)    Wt 86.3 kg (190 lb 4 oz)    Breastfeeding No    BMI 35.95 kg/m2       No intake or output data in the 24 hours ending 07/24/17 1210     Physical Examination:             Constitutional:  No acute distress, cooperative, pleasant    ENT:  Oral mucous moist, oropharynx benign. Neck supple,    Resp:  CTA bilaterally. No wheezing/rhonchi/rales. No accessory muscle use   CV:  Regular rhythm, normal rate, no murmurs, gallops, rubs    GI:  Soft, gravid uterus, abdomen non tender. normoactive bowel sounds, no hepatosplenomegaly     Musculoskeletal:  No edema    Neurologic:  Moves all extremities. AAOx3, CN II-XII reviewed     Skin:  Good turgor, no rashes or ulcers       Data Review:    Review and/or order of clinical lab test      Labs:     Recent Labs      07/23/17   0640  07/22/17   0640   WBC  9.9  9.8   HGB  10.8*  10.9*   HCT  33.3*  34.6*   PLT  146*  140*     Recent Labs      07/22/17   0640   NA  138   K  3.7   CL  106   CO2  24   BUN  10   CREA  0.36*   GLU  153*   CA  8.0*     No results for input(s): SGOT, GPT, ALT, AP, TBIL, TBILI, TP, ALB, GLOB, GGT, AML, LPSE in the last 72 hours. No lab exists for component: AMYP, HLPSE  No results for input(s): INR, PTP, APTT in the last 72 hours.     No lab exists for component: INREXT, INREXT   No results for input(s): FE, TIBC, PSAT, FERR in the last 72 hours. No results found for: FOL, RBCF   No results for input(s): PH, PCO2, PO2 in the last 72 hours. No results for input(s): CPK, CKNDX, TROIQ in the last 72 hours. No lab exists for component: CPKMB  Lab Results   Component Value Date/Time    Cholesterol, total 246 11/16/2016 08:39 AM    HDL Cholesterol 43 11/16/2016 08:39 AM    LDL, calculated 158 11/16/2016 08:39 AM    Triglyceride 227 11/16/2016 08:39 AM    CHOL/HDL Ratio 7.0 06/13/2013 02:50 PM     Lab Results   Component Value Date/Time    Glucose (POC) 149 07/24/2017 08:20 AM    Glucose (POC) 131 07/23/2017 09:51 PM    Glucose (POC) 96 07/23/2017 06:36 PM    Glucose (POC) 111 07/23/2017 04:59 PM    Glucose (POC) 150 07/23/2017 02:47 PM     Lab Results   Component Value Date/Time    Color YELLOW/STRAW 04/28/2017 08:02 PM    Appearance CLEAR 04/28/2017 08:02 PM    Specific gravity 1.029 04/28/2017 08:02 PM    pH (UA) 7.0 04/28/2017 08:02 PM    Protein NEGATIVE  04/28/2017 08:02 PM    Glucose >1000 04/28/2017 08:02 PM    Ketone NEGATIVE  04/28/2017 08:02 PM    Bilirubin NEGATIVE  04/28/2017 08:02 PM    Urobilinogen 1.0 04/28/2017 08:02 PM    Nitrites NEGATIVE  04/28/2017 08:02 PM    Leukocyte Esterase NEGATIVE  04/28/2017 08:02 PM    Epithelial cells MANY 04/28/2017 08:02 PM    Bacteria 1+ 04/28/2017 08:02 PM    WBC 0-4 04/28/2017 08:02 PM    RBC 0-5 04/28/2017 08:02 PM         Medications Reviewed:     Current Facility-Administered Medications   Medication Dose Route Frequency    insulin lispro (HUMALOG) injection 60 Units  60 Units SubCUTAneous TIDAC    insulin lispro (HUMALOG) injection   SubCUTAneous PRN    insulin glargine (LANTUS) inj 35 Units (Syringe 2 of 2. Total dose = 65 units)  35 Units SubCUTAneous QHS    And    insulin glargine (LANTUS) inj 30 Units (Syringe 1 of 2.  Total dose = 65 units)  30 Units SubCUTAneous QHS    diph,Pertuss(AC),Tet Vac-PF (BOOSTRIX) suspension 0.5 mL  0.5 mL IntraMUSCular PRIOR TO DISCHARGE    acetaminophen (TYLENOL) tablet 650 mg  650 mg Oral Q4H PRN    prenatal vit-iron fumarate-fa (PRENATAL PLUS with IRON) tablet 1 Tab  1 Tab Oral DAILY    sodium chloride (NS) flush 5-10 mL  5-10 mL IntraVENous PRN    diphenhydrAMINE (BENADRYL) capsule 25 mg  25 mg Oral Q4H PRN    zolpidem (AMBIEN) tablet 5 mg  5 mg Oral QHS PRN    famotidine (PEPCID) tablet 20 mg  20 mg Oral Q12H PRN    glucose chewable tablet 16 g  4 Tab Oral PRN    glucagon (GLUCAGEN) injection 1 mg  1 mg IntraMUSCular PRN    dextrose 10 % infusion 125-250 mL  125-250 mL IntraVENous PRN     ______________________________________________________________________  EXPECTED LENGTH OF STAY: - - -  ACTUAL LENGTH OF STAY:          6                 Wade Hamilton MD

## 2017-07-24 NOTE — PROGRESS NOTES
2000 Bedside and Verbal shift change report given to PARIS Rizo RN (oncoming nurse) by Corrina Garcia RN (offgoing nurse).  Report included the following information SBAR.     [id number] Hourly Rounds Completed

## 2017-07-24 NOTE — DIABETES MGMT
DTC Progress Note and  Nutrition Consult    Recommendations/ Comments: Fasting blood sugars improving but remains elevated - 149 mg/dL this morning. If appropriate, please consider  NPH (could start with 15 units and titrate up) at bedtime to help with fasting blood sugars    Spoke with  from endocrinology office and she states that Dr. Mary Angulo (patient's endocrinologist) is aware of patient's discharge and that he has been in contact with hospitalist and will be in close connection with patient post discharge. Also, patient will be receiving home health. Spoke with patient and reviewed blood sugar goals and discussed that she will need to be in contact with endocrinologist for insulin adjustments. Discussed that fasting blood sugars are currently above target. Also, reviewed hypoglycemia causes and appropriate treatment. Chart reviewed on [facility]ann. Patient is a 31 y.o. female with Type 2 DM and high degree of insulin resistance. PTA meds:  Lantus 65 units at bedtime and lispro 80 -90 units pre meal             A1c:   Lab Results   Component Value Date/Time    Hemoglobin A1c 7.9 03/07/2017 10:40 AM    Hemoglobin A1c 9.9 11/16/2016 08:39 AM       Recent Glucose Results:   Lab Results   Component Value Date/Time    GLUCPOC 149 (H) 07/24/2017 08:20 AM    GLUCPOC 131 (H) 07/23/2017 09:51 PM    GLUCPOC 96 07/23/2017 06:36 PM        Lab Results   Component Value Date/Time    Creatinine 0.36 07/22/2017 06:40 AM     Estimated Creatinine Clearance: 219.7 mL/min (based on Cr of 0.36). Active Orders   Diet    DIET GESTATIONAL DIABETIC [id number]        PO intake: No data found. Current hospital DM medication: Lantus 65 units at bedtime, lispro 60 units AC and lispro resistant correction scale    Will continue to follow as needed.     Thank you  Cece Garcia, MS, RN, CDE

## 2017-07-24 NOTE — PROGRESS NOTES
High Risk Obstetrics Progress Note    Name: Andrew Pena MRN: [id number]  SSN: [id number]    YOB: 1980  Age: 31 y.o. Sex: female      Subjective:      LOS: 6 days    Estimated Date of Delivery: 10/17/17   Gestational Age Today: 31w5d     Patient admitted for poorly controlled DM. States she does have normal fetal movement and does not have chest pain, contractions, nausea and vomiting, shortness of breath, vaginal bleeding  and vaginal leaking of fluid . Objective:     Vitals:  Blood pressure 132/61, pulse 94, temperature 98.2 °F (36.8 °C), resp. rate 16, height 5' 1\" (1.549 m), weight 86.3 kg (190 lb 4 oz), last menstrual period 01/10/2017, not currently breastfeeding. Temp (24hrs), Av.3 °F (36.8 °C), Min:97.9 °F (36.6 °C), Max:98.5 °F (02.3 °C)    Systolic (36NQY), NDN:438 , Min:123 , HMB:571      Diastolic (14NTE), DUO:22, Min:60, Max:78       Intake and Output:          Physical Exam:  Patient without distress.   Heart: Regular rate and rhythm  Lung: clear to auscultation throughout lung fields, no wheezes, no rales, no rhonchi and normal respiratory effort  Abdomen: soft, nontender, gravid  Lower Extremities:  - Edema No       Membranes:  Intact    Uterine Activity:  None on 17 NST    Fetal Heart Rate:  Reactive on 17 NST        Labs:   Recent Results (from the past 36 hour(s))   GLUCOSE, POC    Collection Time: 17  8:30 PM   Result Value Ref Range    Glucose (POC) 74 65 - 100 mg/dL    Performed by [facility], POC    Collection Time: 17 10:01 PM   Result Value Ref Range    Glucose (POC) 121 (H) 65 - 100 mg/dL    Performed by Kavya Hartmann    GLUCOSE, POC    Collection Time: 17  6:37 AM   Result Value Ref Range    Glucose (POC) 120 (H) 65 - 100 mg/dL    Performed by Kavya Hartmann    CBC W/O DIFF    Collection Time: 17  6:40 AM   Result Value Ref Range    WBC 9.9 3.6 - 11.0 K/uL    RBC 4.08 3.80 - 5.20 M/uL    HGB 10.8 (L) 11.5 - 16.0 g/dL    HCT 33.3 (L) 35.0 - 47.0 %    MCV 81.6 80.0 - 99.0 FL    MCH 26.5 26.0 - 34.0 PG    MCHC 32.4 30.0 - 36.5 g/dL    RDW 14.7 (H) 11.5 - 14.5 %    PLATELET 553 (L) 173 - 400 K/uL   GLUCOSE, POC    Collection Time: 07/23/17 11:06 AM   Result Value Ref Range    Glucose (POC) 179 (H) 65 - 100 mg/dL    Performed by BLUNT DUNCAN    GLUCOSE, POC    Collection Time: 07/23/17  1:50 PM   Result Value Ref Range    Glucose (POC) 70 65 - 100 mg/dL    Performed by BLUNT DUNCAN    GLUCOSE, POC    Collection Time: 07/23/17  2:19 PM   Result Value Ref Range    Glucose (POC) 89 65 - 100 mg/dL    Performed by BLUNT DUNCAN    GLUCOSE, POC    Collection Time: 07/23/17  2:47 PM   Result Value Ref Range    Glucose (POC) 150 (H) 65 - 100 mg/dL    Performed by BLUNT DUNCAN    GLUCOSE, POC    Collection Time: 07/23/17  4:59 PM   Result Value Ref Range    Glucose (POC) 111 (H) 65 - 100 mg/dL    Performed by BLUNT DUNCAN    GLUCOSE, POC    Collection Time: 07/23/17  6:36 PM   Result Value Ref Range    Glucose (POC) 96 65 - 100 mg/dL    Performed by BLUNT DUNCAN    GLUCOSE, POC    Collection Time: 07/23/17  9:51 PM   Result Value Ref Range    Glucose (POC) 131 (H) 65 - 100 mg/dL    Performed by Ángel Nicholson and Plan:      Principal Problem:    Uncontrolled diabetes mellitus ([facility] [address] (7/18/2017)    Active Problems:    Diabetes in pregnancy (7/18/2017)       Diabetes-Type 2 in the setting of pregnancy, IUP at 27w6d  - Outpatient Endocrinologist is Dr. Daniels [facility] - appreciate recs. Pt remains on Lantus 65u qHs, Humalog  65u before meals with SSI covering postprandial and fasting BG. Per IM hospitalist, may consider discharge home on Monday, 7/24 if finger stick glucose stays in 120 range- discharge prepared if okay with IM hospitalist today. - Diabetes education -continue to follow with nutrition counselor with DM education center.  Reports she is becoming more comfortable with food choices.   - Fetal ultrasound completed 7/18 with MFM- all WNL. NST BID.    - Appreciate input on pt's care from Hospitalist, Endocrinologist, and MFM.   - H/o 2 prior c-sections.         Signed By: Annel Akhtar DO     July 24, 2017

## 2017-07-24 NOTE — PROGRESS NOTES
Bedside, Verbal and Written shift change report given to ALEXSANDRA Clark RN (oncoming nurse) by Paul Laboy. Court Alexandra RN (offgoing nurse). Report included the following information SBAR, Kardex, Intake/Output, MAR, Accordion, Recent Results and Med Rec Status. 1355 Reviewed discharge instructions with patient via teach back. Patient verbalized understanding and verbalized insulin schedule and blood sugar checks. Patient discharged home with office follow up. Patient to drive herself home.

## 2017-07-24 NOTE — ANCILLARY DISCHARGE INSTRUCTIONS
[address] [facility].       7/24/2017      RE: Kelli Han      To Whom it May Concern: This is to certify that Kelli Han was admitted to hospital on 7/18/2017   to 7/24/2017. Her  has been helping her during this time. Thank you for your assistance in this matter.     Sincerely,      Linnie Apley, MD   Adult Hospitalist  [phone]

## 2017-07-24 NOTE — DISCHARGE INSTRUCTIONS
Discharge Instructions       PATIENT ID: Juan David Quiñones  MRN: [id number]   YOB: 1980    DATE OF ADMISSION: 7/18/2017 12:55 PM    DATE OF DISCHARGE: 7/24/2017    PRIMARY CARE PROVIDER: Daxa Livingston DO     ATTENDING PHYSICIAN: Daxa Livingston DO  DISCHARGING PROVIDER: Kamron Maloney MD    To contact this individual call [phone] and ask the  to page. If unavailable ask to be transferred the Adult Hospitalist Department. DISCHARGE DIAGNOSES   DM-II in the setting of IUP 27 weeks 6 days    CONSULTATIONS: IP CONSULT TO HOSPITALIST    PROCEDURES/SURGERIES: * No surgery found *    PENDING TEST RESULTS:   At the time of discharge the following test results are still pending: none    FOLLOW UP APPOINTMENTS:   1.Penny Da Silva DO, Obstetrics & Gynecology in one week  2. Marie Vazquez MD, Endocrinologist in one week  3. Call and make appointment to see a Primary Care Physician in 2 weeks    ADDITIONAL CARE RECOMMENDATIONS:    DIET: Diabetic Diet    ACTIVITY: Activity as tolerated    WOUND CARE: none     EQUIPMENT needed: none      DISCHARGE MEDICATIONS:   See Medication Reconciliation Form    · It is important that you take the medication exactly as they are prescribed. · Keep your medication in the bottles provided by the pharmacist and keep a list of the medication names, dosages, and times to be taken in your wallet. · Do not take other medications without consulting your doctor. NOTIFY YOUR PHYSICIAN FOR ANY OF THE FOLLOWING:   Fever over 101 degrees for 24 hours. Chest pain, shortness of breath, fever, chills, nausea, vomiting, diarrhea, change in mentation, falling, weakness, bleeding. Severe pain or pain not relieved by medications. Or, any other signs or symptoms that you may have questions about.       DISPOSITION:  x  Home With:   OT  PT  HH x RN       SNF/Inpatient Rehab/LTAC    Independent/assisted living    Hospice    Other:     CDMP Checked:   Yes x PROBLEM LIST Updated:  Yes x       Signed:   Yoselin Acosta MD  7/24/2017  9:00 AM

## 2017-07-25 ENCOUNTER — TELEPHONE (OUTPATIENT)
Dept: ENDOCRINOLOGY | Age: 37
End: 2017-07-25

## 2017-07-25 RX ORDER — BLOOD-GLUCOSE METER
EACH MISCELLANEOUS
Qty: 1 EACH | Refills: 0 | Status: SHIPPED | OUTPATIENT
Start: 2017-07-25

## 2017-07-25 RX ORDER — PEN NEEDLE, DIABETIC 31 GX3/16"
NEEDLE, DISPOSABLE MISCELLANEOUS
Qty: 200 PEN NEEDLE | Refills: 12 | Status: SHIPPED | OUTPATIENT
Start: 2017-07-25 | End: 2017-12-05 | Stop reason: SDUPTHER

## 2017-07-25 NOTE — TELEPHONE ENCOUNTER
I called Angelica and she needs a new one touch meter and strips. She also needs refills on her pen needles.  These were done and sent to Dr. Herminia Grant to sign  Ivie Hodgkins

## 2017-07-25 NOTE — TELEPHONE ENCOUNTER
----- Message from Justa Ye sent at 7/25/2017 11:56 AM EDT -----  Regarding: Dr. Familia Pressley  Pt would like to get a prescription fpr test stripts, one touch call into [facility] ,pt said she test her DM 8 times a day, if you have any questions please call pt at [phone].

## 2017-08-01 ENCOUNTER — OFFICE VISIT (OUTPATIENT)
Dept: ENDOCRINOLOGY | Age: 37
End: 2017-08-01

## 2017-08-01 VITALS
SYSTOLIC BLOOD PRESSURE: 113 MMHG | DIASTOLIC BLOOD PRESSURE: 68 MMHG | BODY MASS INDEX: 36.84 KG/M2 | HEART RATE: 92 BPM | WEIGHT: 195.1 LBS | HEIGHT: 61 IN

## 2017-08-01 DIAGNOSIS — I10 ESSENTIAL HYPERTENSION WITH GOAL BLOOD PRESSURE LESS THAN 130/80: ICD-10-CM

## 2017-08-01 DIAGNOSIS — O24.912 DIABETES MELLITUS COMPLICATING PREGNANCY, SECOND TRIMESTER: Primary | ICD-10-CM

## 2017-08-01 DIAGNOSIS — E78.5 HYPERLIPIDEMIA, UNSPECIFIED HYPERLIPIDEMIA TYPE: ICD-10-CM

## 2017-08-01 RX ORDER — INSULIN GLARGINE 100 [IU]/ML
INJECTION, SOLUTION SUBCUTANEOUS
Qty: 20 PEN | Refills: 3 | Status: SHIPPED | OUTPATIENT
Start: 2017-08-01 | End: 2017-08-15 | Stop reason: SDUPTHER

## 2017-08-01 RX ORDER — INSULIN LISPRO 100 [IU]/ML
90 INJECTION, SOLUTION INTRAVENOUS; SUBCUTANEOUS
Qty: 7 PACKAGE | Refills: 3 | Status: SHIPPED | OUTPATIENT
Start: 2017-08-01 | End: 2017-08-15 | Stop reason: SDUPTHER

## 2017-08-01 NOTE — PROGRESS NOTES
CHIEF COMPLAINT: f/u uncontrolled type 2 diabetes    HISTORY OF PRESENT ILLNESS:   Anthony Bales is a 31 y.o. female with a PMHx as noted below who presents for f/u of uncontrolled type 2 diabetes. Patient is now 29 weeks pregnant. She had a recent hospitalization per OBGYN to get her sugars under control. She presents today for f/u. She is taking approx the same doses we had her on prior to her admission, and her sugars are still high above goal. She requested an A1c POC today which was 7.2%, though we have advised her its not necessary to check A1c as we are more concerned with home blood sugars. Current medications:  Invokana 300mg daily (HELD FOR PREGNANCY)  Metformin 1000mg BID (HELD FOR PREGNANCY)  Lantus 65 units at bedtime only  Humalog 70 units each meal.   Victoza (HELD FOR PREGNANCY)  Simvastatin 20mg daily (HELD FOR PREGNANCY)  Lisinopril 5mg daily (HELD FOR PREGNANCY)    Review of home glucose: (Over past week)  AM: 160-200's  Lunch: 130-170  Dinner: 150-190's  Bedtime: was not checked    Now drinking only diet soda. Reports that she figured out that the fruity glass bottle drinks she was drinking is not alcoholic.          PAST MEDICAL/SURGICAL HISTORY:   Past Medical History:   Diagnosis Date    Diabetes mellitus ([address] 2011    insulin    Hyperlipidemia     Obesity      Past Surgical History:   Procedure Laterality Date     DELIVERY ONLY          HX  SECTION  2011    breech       ALLERGIES:   No Known Allergies    MEDICATIONS ON ADMISSION:     Current Outpatient Prescriptions:     Blood-Glucose Meter (ONETOUCH VERIO FLEX) misc, Use to test blood sugar 8 times daily, Disp: 1 Each, Rfl: 0    glucose blood VI test strips (ONETOUCH VERIO) strip, Use to test blood sugars 8 times daily, Disp: 100 Strip, Rfl: 11    Insulin Needles, Disposable, (YOUNG PEN NEEDLE) 32 gauge x 5/32\" ndle, Use with insulin pens to inject up to 7 times daily, Disp: 200 Pen Needle, Rfl: 12   insulin glargine (LANTUS,BASAGLAR) 100 unit/mL (3 mL) inpn, Up to 65 units subcutaneously at each bedtime, Disp: 15 Pen, Rfl: 3    famotidine (PEPCID) 20 mg tablet, Take 1 Tab by mouth every twelve (12) hours as needed (Indigestion). , Disp: 20 Tab, Rfl: 0    insulin lispro (HUMALOG) 100 unit/mL kwikpen, 60 Units by SubCUTAneous route Before breakfast, lunch, and dinner. Indications: Diabetes in pregnancy, uncontrolled, Disp: 6 Package, Rfl: 3    acetaminophen (TYLENOL) 325 mg tablet, Take 650 mg by mouth every four (4) hours as needed for Pain., Disp: , Rfl:     glucose blood VI test strips (ONE TOUCH VERIO) strip, Use to test blood glucose 5x daily, Disp: 100 Strip, Rfl: 11    PNV with Ca,No.71-Iron-FA 27-1 mg tab, Take  by mouth., Disp: , Rfl:     SOCIAL HISTORY:   Social History     Social History    Marital status:      Spouse name: N/A    Number of children: N/A    Years of education: N/A     Occupational History    Not on file. Social History Main Topics    Smoking status: Never Smoker    Smokeless tobacco: Never Used    Alcohol use No    Drug use: No    Sexual activity: Yes     Partners: Male     Birth control/ protection: None     Other Topics Concern    Not on file     Social History Narrative       FAMILY HISTORY:  Family History   Problem Relation Age of Onset    Diabetes Mother     Diabetes Father     Diabetes Sister     Diabetes Brother        REVIEW OF SYSTEMS: Complete ROS assessed and noted for that which is described above, all else are negative.   Eyes: normal  ENT: normal  CVS: normal  Resp: normal  GI: normal  : normal  GYN: normal  Endocrine: normal  Integument: normal  Musculoskeletal: normal  Neuro: parasthesias of feet  Psych: normal      PHYSICAL EXAMINATION:    VITAL SIGNS:  Visit Vitals    /68 (BP 1 Location: Left arm, BP Patient Position: Sitting)    Pulse 92    Ht 5' 1\" (1.549 m)    Wt 195 lb 1.6 oz (88.5 kg)    LMP 01/10/2017 (Exact Date)   [facility] BMI 36.86 kg/m2       GENERAL: NCAT, Sitting comfortably, NAD  EYES: EOMI, non-icteric, no proptosis  Ear/Nose/Throat: NCAT, no inflammation, no masses  LYMPH NODES: No LAD  CARDIOVASCULAR: S1 S2, RRR, No murmur  RESPIRATORY: CTA b/l, no wheeze/rales  GASTROINTESTINAL:  ND  MUSCULOSKELETAL: Normal ROM, no atrophy  SKIN: warm, no edema/rash/ or other skin changes  NEUROLOGIC: 5/5 power all extremities, no tremor, AAOx3  PSYCHIATRIC: Normal affect, Normal insight and judgement      REVIEW OF LABORATORY AND RADIOLOGY DATA:   Labs and documentation have been reviewed as described above. ASSESSMENT AND PLAN:   Lavon Vazquez is a 31 y.o. female with a PMHx as noted above who presents for f/u of uncontrolled type 2 diabetes. Type 2 diabetes Uncontrolled in Pregnancy  Hyperlipidemia  Hypertension  Vitamin D deficiency    Needs aggressive changes in her insulin dose. Going up as expected. Continuing to avoid sodas. Plan: Type 2 Diabetes  Medications:  Lantus: Continue 65 units once daily  Increase Humalog to 80 units with each meal, anticipate increasing again soon  1:15 correction scale  Off invokana, metformin, and victoza while pregnant  Check glucose ACHS (Need to check bedtime blood sugars also)  Goal Fasting blood sugar <90  Goal premeal blood sugar <120    HTN: BP stable, HOLD lisinopril 5mg FOR PREGNANCY  HLD:  HOLD Simvastatin 20mg daily FOR PREGNANCY  Vitamin D Deficiency: Continue 2000 units of D3 daily, should be on prenatal vitamins    RTC: 2 week f/u visit    Sky [place]  [address] [place] [address] Diabetes & Endocrinology

## 2017-08-01 NOTE — PATIENT INSTRUCTIONS
Lantus: Continue 65 units once daily  Increase Humalog to 80 units with each meal  1:15 correction scale      Correction Scale  1:15>150    Currently you are taking insulin with your meals. As we have discussed it is important to always check your glucose level just before taking your mealtime insulin dose. Sometimes before eating your meal, your glucose level is already much higher than the goal and so you may require a few units of extra insulin. This is in addition to the scheduled dose that you plan to take for the meal. Using the scale below, add the amount of extra insulin you need to the dose you already plan to take and inject together as one injection. As always continue to monitor your glucose afterward to assure recovery of your glucose levels. IF GLUCOSE IS:                 THEN TAKE:      0   Extra Unit  151-165   1   Extra Unit  166-180   2   Extra Units  181-195   3   Extra Units  196-210   4   Extra Units  211-225   5   Extra Units  226-240   6   Extra Units  241-255   7   Extra Units  256-270   8   Extra Units  271-285   9   Extra Units  286-300   10 Extra Units    ----------------------------------------------------------------------------------------------------------------------    Below you will find a glucose log sheet which you can use to record your blood sugars. Without checking and recording what your home glucose levels are, it will be difficult to make any changes to your medication dose, even when significant changes may be needed. Please feel free to use the log below to record your home glucose levels. At the very least, I would like for you to login the entire 2-3 weeks just before your visit so we can make your visit much more productive and beneficial to you. GLUCOSE LOG SHEET:    Date Breakfast Lunch Dinner Bedtime Comments ? GLUCOSE LOG SHEET:    Date Breakfast Lunch Dinner Bedtime Comments ? GLUCOSE LOG SHEET:    Date Breakfast Lunch Dinner Bedtime Comments ? Dr. Becky Bruner to basics:    When you have diabetes or pre-diabetes, as you know, your body has difficulty dealing with the sugar it absorbs from your meals. An unrelated but very relevant term you may have heard before, quantitative easing, has a new meaning: By gradually reducing the load of sugars entering the body, you ease the stress on the body and allow it to catch up with the work it must do. This in turn will allow your body to work better. On top of this, remember one point, the more sugar stress your body has, the more you enter a state of glucose toxicity and this is a state where you become even more resistant. Thats right higher sugar = more resistance, not only to your own bodies attempt to fix the problem but also resistance to your medications. This is why medications work best when a proper diet is followed. Tip 1. Dont forget the protein. When you add a portion of meat or other low carb protein food in your meal, it provides healthy calories which contribute to reducing that feeling of hunger that drives you to eat what you dont want. Tip 2. Portions are a real thing. Before you eat, stop and look at your plate/table. Count how many items have sugars/carbs in them.   A sandwich (bread) ? Deatrice Spillers ? Sweet drink ? Potatoe ? Pasta ? Rice ? You would be surprised when you become aware. Aim for a reasonable portion of carbs, and if you feel you absolutely cannot do this, at least start working toward this. 45-60 grams is usually more than enough in one meal. And YES, than includes the desert! Tip 3. Three meals per day, snack-free in between! Your body needs a break. Eating an adequate meal keeps you from getting hungry and reaching for a snack in between meals. Recall that most of the time, diabetic patients are not treating these snacks. Dont eat dinner late at night, but rather allow more overnight fasting time for your body to recover. If you eat dinner at 8 PM, try 6 PM.  Sporadic eating is the opposite of what you need, and adjusting to a regular eating schedule such as this will not only be a great benefit to your body, but your medications will also tend to work better at keeping your diabetes under control. Tip 4. There is no best diet when it comes to weight loss. When comparing diets and outcomes, the main ingredient when searching for weight loss was calories ! Lower calories = better weight loss. Pick a healthy diet thats right for you, i.e. diabetes friendly, and evaluate your daily calorie intake. And of course this would be of no use without exercise. Calories in need calories out.

## 2017-08-01 NOTE — MR AVS SNAPSHOT
Visit Information Date & Time Provider Department Dept. Phone Encounter #  
 8/1/2017  9:50 AM MD Dougie [facility]astel[facility] [id number] Follow-up Instructions Return in about 2 weeks (around 8/15/2017). Your Appointments 8/15/2017  9:50 AM  
Follow Up with MD [facility] [address] [place] [address] Road) [facility] [address] [address] [address]  
  
    
 8/29/2017 10:10 AM  
Follow Up with MD [facility] [address] [place] [address]) [facility] [address] [address] [phone] Upcoming Health Maintenance Date Due  
 EYE EXAM RETINAL OR DILATED Q1 12/6/1992 Pneumococcal 19-64 Medium Risk (1 of 1 - PPSV23) 12/6/2001 INFLUENZA AGE 9 TO ADULT 8/1/2017 FOOT EXAM Q1 9/13/2017 MICROALBUMIN Q1 11/16/2017 LIPID PANEL Q1 11/16/2017 HEMOGLOBIN A1C Q6M 12/20/2017 PAP AKA CERVICAL CYTOLOGY 2/13/2020 DTaP/Tdap/Td series (2 - Td) 7/24/2027 Allergies as of 8/1/2017  Review Complete On: 8/1/2017 By: Ira Tomas MD  
 No Known Allergies Current Immunizations  Reviewed on 9/5/2014 Name Date Influenza High Dose Vaccine PF 10/1/2013 Tdap 7/24/2017  1:29 PM  
  
 Not reviewed this visit You Were Diagnosed With   
  
 Codes Comments Diabetes mellitus complicating pregnancy, second trimester    -  Primary ICD-10-CM: O24.912 ICD-9-CM: 648.03, 250.00 Hyperlipidemia, unspecified hyperlipidemia type     ICD-10-CM: E78.5 ICD-9-CM: 272.4 Essential hypertension with goal blood pressure less than 130/80     ICD-10-CM: I10 
ICD-9-CM: 401.9 Vitals BP Pulse Height(growth percentile) Weight(growth percentile) LMP BMI 113/68 (BP 1 Location: Left arm, BP Patient Position: Sitting) 92 5' 1\" (1.549 m) 195 lb 1.6 oz (88.5 kg) 01/10/2017 (Exact Date) 36.86 kg/m2 OB Status Smoking Status Pregnant Never Smoker BMI and BSA Data Body Mass Index Body Surface Area  
 36.86 kg/m 2 1.95 m 2 Preferred Pharmacy Pharmacy Name Phone [facility] [address] [place] [address] Your Updated Medication List  
  
   
This list is accurate as of: 8/1/17 10:36 AM.  Always use your most recent med list.  
  
  
  
  
 Blood-Glucose Meter Misc Commonly known as:  ONETOUCH VERIO FLEX Use to test blood sugar 8 times daily  
  
 famotidine 20 mg tablet Commonly known as:  PEPCID Take 1 Tab by mouth every twelve (12) hours as needed (Indigestion). * glucose blood VI test strips strip Commonly known as:  Christian Loffler Use to test blood glucose 5x daily * glucose blood VI test strips strip Commonly known as:  Christian Loffler Use to test blood sugars 8 times daily  
  
 insulin glargine 100 unit/mL (3 mL) Inpn Commonly known as:  Emmalene Come Up to 65 units subcutaneously at each bedtime  
  
 insulin lispro 100 unit/mL kwikpen Commonly known as:  HUMALOG  
60 Units by SubCUTAneous route Before breakfast, lunch, and dinner. Indications: Diabetes in pregnancy, uncontrolled Insulin Needles (Disposable) 32 gauge x 5/32\" Ndle Commonly known as:  Anita Pen Needle Use with insulin pens to inject up to 7 times daily PNV with Ca,No.71-Iron-FA 27-1 mg Tab Take  by mouth. TYLENOL 325 mg tablet Generic drug:  acetaminophen Take 650 mg by mouth every four (4) hours as needed for Pain. * Notice: This list has 2 medication(s) that are the same as other medications prescribed for you. Read the directions carefully, and ask your doctor or other care provider to review them with you. Follow-up Instructions Return in about 2 weeks (around 8/15/2017). To-Do List   
 08/04/2017 2:30 PM  
  Appointment with ULTRASOUND 1 [facility] at [address] [place] [address] [address] ([phone]) Patient Instructions Lantus: Continue 65 units once daily Increase Humalog to 80 units with each meal 
1:15 correction scale Correction Scale  1:15>150 Currently you are taking insulin with your meals. As we have discussed it is important to always check your glucose level just before taking your mealtime insulin dose. Sometimes before eating your meal, your glucose level is already much higher than the goal and so you may require a few units of extra insulin. This is in addition to the scheduled dose that you plan to take for the meal. Using the scale below, add the amount of extra insulin you need to the dose you already plan to take and inject together as one injection. As always continue to monitor your glucose afterward to assure recovery of your glucose levels. IF GLUCOSE IS:                 THEN TAKE: 
    0   Extra Unit 151-165   1   Extra Unit 166-180   2   Extra Units 181-195   3   Extra Units 196-210   4   Extra Units 211-225   5   Extra Units 226-240   6   Extra Units 241-255   7   Extra Units 256-270   8   Extra Units 271-285   9   Extra Units 286-300   10 Extra Units 
 
---------------------------------------------------------------------------------------------------------------------- Below you will find a glucose log sheet which you can use to record your blood sugars. Without checking and recording what your home glucose levels are, it will be difficult to make any changes to your medication dose, even when significant changes may be needed. Please feel free to use the log below to record your home glucose levels. At the very least, I would like for you to login the entire 2-3 weeks just before your visit so we can make your visit much more productive and beneficial to you. GLUCOSE LOG SHEET: 
 
Date Breakfast Lunch Dinner Bedtime Comments ? GLUCOSE LOG SHEET: 
 
Date Breakfast Lunch Dinner Bedtime Comments ? GLUCOSE LOG SHEET: 
 
Date Breakfast Lunch Dinner Bedtime Comments ? Dr. Da Hameed Back to basics: 
 
When you have diabetes or pre-diabetes, as you know, your body has difficulty dealing with the sugar it absorbs from your meals. An unrelated but very relevant term you may have heard before, quantitative easing, has a new meaning: By gradually reducing the load of sugars entering the body, you ease the stress on the body and allow it to catch up with the work it must do. This in turn will allow your body to work better. On top of this, remember one point, the more sugar stress your body has, the more you enter a state of glucose toxicity and this is a state where you become even more resistant. Thats right higher sugar = more resistance, not only to your own bodies attempt to fix the problem but also resistance to your medications. This is why medications work best when a proper diet is followed. Tip 1. Dont forget the protein.  When you add a portion of meat or other low carb protein food in your meal, it provides healthy calories which contribute to reducing that feeling of hunger that drives you to eat what you dont want. Tip 2. Portions are a real thing. Before you eat, stop and look at your plate/table. Count how many items have sugars/carbs in them. A sandwich (bread) ? Ernestene Labrador ? Sweet drink ? Potatoe ? Pasta ? Rice ? You would be surprised when you become aware. Aim for a reasonable portion of carbs, and if you feel you absolutely cannot do this, at least start working toward this. 45-60 grams is usually more than enough in one meal. And YES, than includes the desert! Tip 3. Three meals per day, snack-free in between! Your body needs a break. Eating an adequate meal keeps you from getting hungry and reaching for a snack in between meals. Recall that most of the time, diabetic patients are not treating these snacks. Dont eat dinner late at night, but rather allow more overnight fasting time for your body to recover. If you eat dinner at 8 PM, try 6 PM.  Sporadic eating is the opposite of what you need, and adjusting to a regular eating schedule such as this will not only be a great benefit to your body, but your medications will also tend to work better at keeping your diabetes under control. Tip 4. There is no best diet when it comes to weight loss. When comparing diets and outcomes, the main ingredient when searching for weight loss was calories ! Lower calories = better weight loss. Pick a healthy diet thats right for you, i.e. diabetes friendly, and evaluate your daily calorie intake. And of course this would be of no use without exercise. Calories in need calories out. Introducing [facility] & HEALTH SERVICES! Juancho Deutsch introduces [employer] ([employer]) patient portal. Now you can access parts of your medical record, email your doctor's office, and request medication refills online. 1. In your internet browser, go to [email or web address] 2. Click on the First Time User? Click Here link in the Sign In box.  You will see the New Member Sign Up page. 3. Enter your Symtext Access Code exactly as it appears below. You will not need to use this code after youve completed the sign-up process. If you do not sign up before the expiration date, you must request a new code. · Symtext Access Code: [email or web address] Expires: 10/9/2017 10:18 AM 
 
4. Enter the last four digits of your Social Security Number (xxxx) and Date of Birth (mm/dd/yyyy) as indicated and click Submit. You will be taken to the next sign-up page. 5. Create a Symtext ID. This will be your Symtext login ID and cannot be changed, so think of one that is secure and easy to remember. 6. Create a Symtext password. You can change your password at any time. 7. Enter your Password Reset Question and Answer. This can be used at a later time if you forget your password. 8. Enter your e-mail address. You will receive e-mail notification when new information is available in [address] 9. Click Sign Up. You can now view and download portions of your medical record. 10. Click the Download Summary menu link to download a portable copy of your medical information. If you have questions, please visit the Frequently Asked Questions section of the Symtext website. Remember, Symtext is NOT to be used for urgent needs. For medical emergencies, dial 911. Now available from your iPhone and Android! Please provide this summary of care documentation to your next provider. Your primary care clinician is listed as Martinez Lima. If you have any questions after today's visit, please call [phone].

## 2017-08-04 ENCOUNTER — HOSPITAL ENCOUNTER (OUTPATIENT)
Dept: PERINATAL CARE | Age: 37
Discharge: HOME | End: 2017-08-04
Attending: OBSTETRICS & GYNECOLOGY
Payer: MEDICAID

## 2017-08-04 PROCEDURE — 76816 OB US FOLLOW-UP PER FETUS: CPT | Performed by: OBSTETRICS & GYNECOLOGY

## 2017-08-15 ENCOUNTER — OFFICE VISIT (OUTPATIENT)
Dept: ENDOCRINOLOGY | Age: 37
End: 2017-08-15

## 2017-08-15 ENCOUNTER — HOSPITAL ENCOUNTER (OUTPATIENT)
Dept: PERINATAL CARE | Age: 37
Discharge: HOME | End: 2017-08-15
Payer: MEDICAID

## 2017-08-15 VITALS
RESPIRATION RATE: 18 BRPM | HEIGHT: 61 IN | BODY MASS INDEX: 37.38 KG/M2 | OXYGEN SATURATION: 98 % | DIASTOLIC BLOOD PRESSURE: 62 MMHG | WEIGHT: 198 LBS | TEMPERATURE: 98 F | SYSTOLIC BLOOD PRESSURE: 103 MMHG | HEART RATE: 94 BPM

## 2017-08-15 DIAGNOSIS — E78.5 HYPERLIPIDEMIA, UNSPECIFIED HYPERLIPIDEMIA TYPE: ICD-10-CM

## 2017-08-15 DIAGNOSIS — I10 ESSENTIAL HYPERTENSION WITH GOAL BLOOD PRESSURE LESS THAN 130/80: ICD-10-CM

## 2017-08-15 DIAGNOSIS — R73.09 ELEVATED HEMOGLOBIN A1C MEASUREMENT: Primary | ICD-10-CM

## 2017-08-15 DIAGNOSIS — O24.912 DIABETES MELLITUS COMPLICATING PREGNANCY, SECOND TRIMESTER: ICD-10-CM

## 2017-08-15 LAB — HBA1C MFR BLD HPLC: 7.1 %

## 2017-08-15 PROCEDURE — 76818 FETAL BIOPHYS PROFILE W/NST: CPT | Performed by: OBSTETRICS & GYNECOLOGY

## 2017-08-15 RX ORDER — INSULIN GLARGINE 100 [IU]/ML
INJECTION, SOLUTION SUBCUTANEOUS
Qty: 20 PEN | Refills: 3 | Status: SHIPPED | OUTPATIENT
Start: 2017-08-15 | End: 2017-08-29 | Stop reason: SDUPTHER

## 2017-08-15 RX ORDER — INSULIN LISPRO 100 [IU]/ML
100 INJECTION, SOLUTION INTRAVENOUS; SUBCUTANEOUS
Qty: 7 PACKAGE | Refills: 3 | Status: SHIPPED | OUTPATIENT
Start: 2017-08-15 | End: 2017-08-29 | Stop reason: SDUPTHER

## 2017-08-15 NOTE — MR AVS SNAPSHOT
Visit Information Date & Time Provider Department Dept. Phone Encounter #  
 8/15/2017  9:50 AM Edith Barney, [address] [place] [address] Diabetes and Endocrinology [phone] [id number] Your Appointments 8/29/2017 10:10 AM  
Follow Up with MD Dougie Camacho [facility] [address] [place] [address]) [facility] [address] [address] [address] [place] [address] Road 9/12/2017  9:50 AM  
Follow Up with MD Dougie Cam[facility] [address] [place] [address]) [facility] [address] [address] [phone]  
  
    
 9/26/2017  9:50 AM  
Follow Up with MD Dougie Cam[facility] [address] [place] [address]) Appt Note: 2 week f/u Diabetes  (OK per Dr. Berna Nicole) [facility] [address] [address] [phone] Upcoming Health Maintenance Date Due  
 EYE EXAM RETINAL OR DILATED Q1 12/6/1992 Pneumococcal 19-64 Medium Risk (1 of 1 - PPSV23) 12/6/2001 INFLUENZA AGE 9 TO ADULT 8/1/2017 FOOT EXAM Q1 9/13/2017 MICROALBUMIN Q1 11/16/2017 LIPID PANEL Q1 11/16/2017 HEMOGLOBIN A1C Q6M 12/20/2017 PAP AKA CERVICAL CYTOLOGY 2/13/2020 DTaP/Tdap/Td series (2 - Td) 7/24/2027 Allergies as of 8/15/2017  Review Complete On: 8/15/2017 By: Edith Barney MD  
 No Known Allergies Current Immunizations  Reviewed on 9/5/2014 Name Date Influenza High Dose Vaccine PF 10/1/2013 Tdap 7/24/2017  1:29 PM  
  
 Not reviewed this visit You Were Diagnosed With   
  
 Codes Comments Elevated hemoglobin A1c measurement    -  Primary ICD-10-CM: R73.09 
ICD-9-CM: 790.29 Diabetes mellitus complicating pregnancy, second trimester     ICD-10-CM: O24.912 ICD-9-CM: 648.03, 250.00 Hyperlipidemia, unspecified hyperlipidemia type     ICD-10-CM: E78.5 ICD-9-CM: 272.4 Essential hypertension with goal blood pressure less than 130/80     ICD-10-CM: I10 
ICD-9-CM: 401.9 Vitals BP Pulse Temp Resp Height(growth percentile) Weight(growth percentile) 103/62 (BP 1 Location: Left arm, BP Patient Position: Sitting) 94 98 °F (36.7 °C) (Oral) 18 5' 1\" (1.549 m) 198 lb (89.8 kg) LMP SpO2 BMI OB Status Smoking Status 01/10/2017 (Exact Date) 98% 37.41 kg/m2 Pregnant Never Smoker Vitals History BMI and BSA Data Body Mass Index Body Surface Area  
 37.41 kg/m 2 1.97 m 2 Preferred Pharmacy Pharmacy Name Phone [facility] [address] [place] [address] Your Updated Medication List  
  
   
This list is accurate as of: 8/15/17 10:08 AM.  Always use your most recent med list.  
  
  
  
  
 Blood-Glucose Meter Misc Commonly known as:  ONETOUCH VERIO FLEX Use to test blood sugar 8 times daily  
  
 famotidine 20 mg tablet Commonly known as:  PEPCID Take 1 Tab by mouth every twelve (12) hours as needed (Indigestion). * glucose blood VI test strips strip Commonly known as:  Conda Grey Use to test blood glucose 5x daily * glucose blood VI test strips strip Commonly known as:  Conda Grey Use to test blood sugars 8 times daily  
  
 insulin glargine 100 unit/mL (3 mL) Inpn Commonly known as:  Kinga Nan Up to 100 units subcutaneously at each bedtime  
  
 insulin lispro 100 unit/mL kwikpen Commonly known as:  HUMALOG  
100 Units by SubCUTAneous route Before breakfast, lunch, and dinner. Indications: Diabetes in pregnancy, uncontrolled Insulin Needles (Disposable) 32 gauge x 5/32\" Ndle Commonly known as:  Anita Pen Needle Use with insulin pens to inject up to 7 times daily PNV with Ca,No.71-Iron-FA 27-1 mg Tab Take  by mouth. TYLENOL 325 mg tablet Generic drug:  acetaminophen Take 650 mg by mouth every four (4) hours as needed for Pain. * Notice: This list has 2 medication(s) that are the same as other medications prescribed for you. Read the directions carefully, and ask your doctor or other care provider to review them with you. Prescriptions Printed Refills  
 insulin glargine (LANTUS,BASAGLAR) 100 unit/mL (3 mL) inpn 3 Sig: Up to 100 units subcutaneously at each bedtime Class: Print  
 insulin lispro (HUMALOG) 100 unit/mL kwikpen 3 Si Units by SubCUTAneous route Before breakfast, lunch, and dinner. Indications: Diabetes in pregnancy, uncontrolled Class: Print Route: SubCUTAneous We Performed the Following AMB POC HEMOGLOBIN A1C [76113 CPT(R)] To-Do List   
 08/15/2017 11:00 AM  
  Appointment with ULTRASOUND 1 [facility] at [address] [place] [address] 75 ([phone]) Patient Instructions Lantus: Increase to 80 units once daily Increase Humalog to 95 units with each meal, anticipate increasing again soon Correction Scale  1:15>150 Currently you are taking insulin with your meals. As we have discussed it is important to always check your glucose level just before taking your mealtime insulin dose. Sometimes before eating your meal, your glucose level is already much higher than the goal and so you may require a few units of extra insulin. This is in addition to the scheduled dose that you plan to take for the meal. Using the scale below, add the amount of extra insulin you need to the dose you already plan to take and inject together as one injection. As always continue to monitor your glucose afterward to assure recovery of your glucose levels. IF GLUCOSE IS:                 THEN TAKE: 
    0   Extra Unit 151-165   1   Extra Unit 166-180   2   Extra Units 181-195   3   Extra Units 196-210   4   Extra Units 211-225   5   Extra Units 226-240   6   Extra Units 241-255   7   Extra Units 256-270   8   Extra Units 271-285   9   Extra Units 286-300   10 Extra Units Introducing [facility] & HEALTH SERVICES! Jayda Singh introduces [employer] patient portal. Now you can access parts of your medical record, email your doctor's office, and request medication refills online. 1. In your internet browser, go to [email or web address] 2. Click on the First Time User? Click Here link in the Sign In box. You will see the New Member Sign Up page. 3. Enter your [employer] Access Code exactly as it appears below. You will not need to use this code after youve completed the sign-up process. If you do not sign up before the expiration date, you must request a new code. · [employer] Access Code: [email or web address] Expires: 10/9/2017 10:18 AM 
 
4. Enter the last four digits of your Social Security Number (xxxx) and Date of Birth (mm/dd/yyyy) as indicated and click Submit. You will be taken to the next sign-up page. 5. Create a [employer] ID. This will be your [employer] login ID and cannot be changed, so think of one that is secure and easy to remember. 6. Create a [employer] password. You can change your password at any time. 7. Enter your Password Reset Question and Answer. This can be used at a later time if you forget your password. 8. Enter your e-mail address. You will receive e-mail notification when new information is available in [address] 9. Click Sign Up. You can now view and download portions of your medical record. 10. Click the Download Summary menu link to download a portable copy of your medical information. If you have questions, please visit the Frequently Asked Questions section of the [employer] website. Remember, [employer] is NOT to be used for urgent needs. For medical emergencies, dial 911. Now available from your iPhone and Android! Please provide this summary of care documentation to your next provider. Your primary care clinician is listed as Amanda Yuen. If you have any questions after today's visit, please call [phone].

## 2017-08-15 NOTE — PROGRESS NOTES
Padmini Felder is a 31 y.o. female    Chief Complaint   Patient presents with    Diabetes     2 week F/up    Other     PCP and Pharmacy Confirmed     1. Have you been to the ER, urgent care clinic since your last visit? Hospitalized since your last visit? No    2. Have you seen or consulted any other health care providers outside of the [address] [place] [address] since your last visit? Include any pap smears or colon screening.  No

## 2017-08-15 NOTE — PROGRESS NOTES
CHIEF COMPLAINT: f/u uncontrolled type 2 diabetes    HISTORY OF PRESENT ILLNESS:   Lavon Vazquez is a 31 y.o. female with a PMHx as noted below who presents for f/u of uncontrolled type 2 diabetes. Presents for f/u, does not have record of her home blood sugars. Description as below, notes persistent high blood sugars,  Denies drinking sodas. Insists on checking her A1c again, though advised her home glucose numbers are what we will be treating. .    Current medications:  Invokana 300mg daily (HELD FOR PREGNANCY)  Metformin 1000mg BID (HELD FOR PREGNANCY)  Lantus 70 units at bedtime only  Humalog 80 units each meal.   Victoza (HELD FOR PREGNANCY)  Simvastatin 20mg daily (HELD FOR PREGNANCY)  Lisinopril 5mg daily (HELD FOR PREGNANCY)    Review of home glucose:  No log with her today, from memory  AM: 170-200's  Lunch: 148-180  Dinner: 148-180  Bedtime: 148-195    Only using diet soda, not regular. PAST MEDICAL/SURGICAL HISTORY:   Past Medical History:   Diagnosis Date    Diabetes mellitus ([facility] [address] 2011    insulin    Hyperlipidemia     Obesity      Past Surgical History:   Procedure Laterality Date     DELIVERY ONLY      2011    HX  SECTION  2011    breech       ALLERGIES:   No Known Allergies    MEDICATIONS ON ADMISSION:     Current Outpatient Prescriptions:     insulin glargine (LANTUS,BASAGLAR) 100 unit/mL (3 mL) inpn, Up to 80 units subcutaneously at each bedtime, Disp: 20 Pen, Rfl: 3    insulin lispro (HUMALOG) 100 unit/mL kwikpen, 90 Units by SubCUTAneous route Before breakfast, lunch, and dinner.  Indications: Diabetes in pregnancy, uncontrolled, Disp: 7 Package, Rfl: 3    Blood-Glucose Meter (ONETOUCH VERIO FLEX) misc, Use to test blood sugar 8 times daily, Disp: 1 Each, Rfl: 0    glucose blood VI test strips (ONETOUCH VERIO) strip, Use to test blood sugars 8 times daily, Disp: 100 Strip, Rfl: 11    Insulin Needles, Disposable, (YOUNG PEN NEEDLE) 32 gauge x \" ndle, Use with insulin pens to inject up to 7 times daily, Disp: 200 Pen Needle, Rfl: 12    famotidine (PEPCID) 20 mg tablet, Take 1 Tab by mouth every twelve (12) hours as needed (Indigestion). , Disp: 20 Tab, Rfl: 0    acetaminophen (TYLENOL) 325 mg tablet, Take 650 mg by mouth every four (4) hours as needed for Pain., Disp: , Rfl:     glucose blood VI test strips (ONE TOUCH VERIO) strip, Use to test blood glucose 5x daily, Disp: 100 Strip, Rfl: 11    PNV with Ca,No.71-Iron-FA 27-1 mg tab, Take  by mouth., Disp: , Rfl:     SOCIAL HISTORY:   Social History     Social History    Marital status:      Spouse name: N/A    Number of children: N/A    Years of education: N/A     Occupational History    Not on file. Social History Main Topics    Smoking status: Never Smoker    Smokeless tobacco: Never Used    Alcohol use No    Drug use: No    Sexual activity: Yes     Partners: Male     Birth control/ protection: None     Other Topics Concern    Not on file     Social History Narrative       FAMILY HISTORY:  Family History   Problem Relation Age of Onset    Diabetes Mother     Diabetes Father     Diabetes Sister     Diabetes Brother        REVIEW OF SYSTEMS: Complete ROS assessed and noted for that which is described above, all else are negative.   Eyes: normal  ENT: normal  CVS: normal  Resp: normal  GI: normal  : normal  GYN: normal  Endocrine: normal  Integument: normal  Musculoskeletal: normal  Neuro: parasthesias of feet  Psych: normal      PHYSICAL EXAMINATION:    VITAL SIGNS:  Visit Vitals    /62 (BP 1 Location: Left arm, BP Patient Position: Sitting)    Pulse 94    Temp 98 °F (36.7 °C) (Oral)    Resp 18    Ht 5' 1\" (1.549 m)    Wt 198 lb (89.8 kg)    LMP 01/10/2017 (Exact Date)    SpO2 98%    BMI 37.41 kg/m2       GENERAL: NCAT, Sitting comfortably, NAD  EYES: EOMI, non-icteric, no proptosis  Ear/Nose/Throat: NCAT, no inflammation, no masses  LYMPH NODES: No LAD  CARDIOVASCULAR: S1 S2, RRR, No murmur  RESPIRATORY: CTA b/l, no wheeze/rales  GASTROINTESTINAL:  ND  MUSCULOSKELETAL: Normal ROM, no atrophy  SKIN: warm, no edema/rash/ or other skin changes  NEUROLOGIC: 5/5 power all extremities, no tremor, AAOx3  PSYCHIATRIC: Normal affect, Normal insight and judgement      REVIEW OF LABORATORY AND RADIOLOGY DATA:   Labs and documentation have been reviewed as described above. ASSESSMENT AND PLAN:   Mihaela Rosa is a 31 y.o. female with a PMHx as noted above who presents for f/u of uncontrolled type 2 diabetes. Type 2 diabetes Uncontrolled in Pregnancy  Hyperlipidemia  Hypertension  Vitamin D deficiency    Persistent uncontrolled diabetes, taking another aggressive increase in regimen today. Plan: Type 2 Diabetes  Medications:  Lantus: Increase from 70 to 80 units once daily  Increase Humalog from 80 to 95 units with each meal, anticipate increasing again soon  1:15 correction scale in addition to the standing dose above. Off invokana, metformin, and victoza while pregnant  Check glucose ACHS (Need to check bedtime blood sugars also)  Goal Fasting blood sugar <90  Goal premeal blood sugar <120    HTN: BP stable, HOLD lisinopril 5mg FOR PREGNANCY  HLD:  HOLD Simvastatin 20mg daily FOR PREGNANCY  Vitamin D Deficiency: Continue 2000 units of D3 daily, should be on prenatal vitamins    RTC: 2 week f/u visit    Tonye Apley T.  [address] [place] [address] Diabetes & Endocrinology

## 2017-08-15 NOTE — PATIENT INSTRUCTIONS
Lantus: Increase to 80 units once daily  Increase Humalog to 95 units with each meal, anticipate increasing again soon    Correction Scale  1:15>150    Currently you are taking insulin with your meals. As we have discussed it is important to always check your glucose level just before taking your mealtime insulin dose. Sometimes before eating your meal, your glucose level is already much higher than the goal and so you may require a few units of extra insulin. This is in addition to the scheduled dose that you plan to take for the meal. Using the scale below, add the amount of extra insulin you need to the dose you already plan to take and inject together as one injection. As always continue to monitor your glucose afterward to assure recovery of your glucose levels.      IF GLUCOSE IS:                 THEN TAKE:      0   Extra Unit  151-165   1   Extra Unit  166-180   2   Extra Units  181-195   3   Extra Units  196-210   4   Extra Units  211-225   5   Extra Units  226-240   6   Extra Units  241-255   7   Extra Units  256-270   8   Extra Units  271-285   9   Extra Units  286-300   10 Extra Units

## 2017-08-22 ENCOUNTER — TELEPHONE (OUTPATIENT)
Dept: ENDOCRINOLOGY | Age: 37
End: 2017-08-22

## 2017-08-22 ENCOUNTER — HOSPITAL ENCOUNTER (OUTPATIENT)
Dept: PERINATAL CARE | Age: 37
Discharge: HOME | End: 2017-08-22
Attending: OBSTETRICS & GYNECOLOGY
Payer: MEDICAID

## 2017-08-22 PROCEDURE — 76818 FETAL BIOPHYS PROFILE W/NST: CPT | Performed by: OBSTETRICS & GYNECOLOGY

## 2017-08-22 NOTE — TELEPHONE ENCOUNTER
Received blood sugar log from Dr. Vince Braden office. Reviewed:  AM: 200's  Lunch: 140-170's  Dinner: 150-180's  Bedtime: Not obtained:    Plan:  Needs to start checking bedtime blood sugars as well. Increase Lantus to 95 units  Increase Humalog to 100 units with each meal  Make sure to hold the insulin pen plunger down for 10 seconds after injection before pulling needle out and depressing the plunger. Avoid all sweetened beverages  Strict diet control    Continue current correction scale for high blood sugars in addition to the humalog 100 units with each meal. Keep strict records and provide an update using current doses. Justa, please notify patient of the above plan, much appreciated ! I will forward a copy of this note to Dr. Romie Mckeon. Duane Marcus.  [address] [place] [address] Endocrinology  [address] [place] [address]

## 2017-08-22 NOTE — TELEPHONE ENCOUNTER
I called Angelica and relayed the information from Dr. Erich Jett. She wrote this down and understood what I told her.   Kristy Bryant

## 2017-08-29 ENCOUNTER — OFFICE VISIT (OUTPATIENT)
Dept: ENDOCRINOLOGY | Age: 37
End: 2017-08-29

## 2017-08-29 ENCOUNTER — HOSPITAL ENCOUNTER (OUTPATIENT)
Dept: PERINATAL CARE | Age: 37
Discharge: HOME | End: 2017-08-29
Attending: OBSTETRICS & GYNECOLOGY
Payer: MEDICAID

## 2017-08-29 VITALS
SYSTOLIC BLOOD PRESSURE: 115 MMHG | DIASTOLIC BLOOD PRESSURE: 62 MMHG | WEIGHT: 203.4 LBS | BODY MASS INDEX: 38.43 KG/M2 | HEART RATE: 113 BPM

## 2017-08-29 DIAGNOSIS — O24.912 DIABETES MELLITUS COMPLICATING PREGNANCY, SECOND TRIMESTER: Primary | ICD-10-CM

## 2017-08-29 DIAGNOSIS — I10 ESSENTIAL HYPERTENSION WITH GOAL BLOOD PRESSURE LESS THAN 130/80: ICD-10-CM

## 2017-08-29 DIAGNOSIS — E78.5 HYPERLIPIDEMIA, UNSPECIFIED HYPERLIPIDEMIA TYPE: ICD-10-CM

## 2017-08-29 PROCEDURE — 76816 OB US FOLLOW-UP PER FETUS: CPT | Performed by: OBSTETRICS & GYNECOLOGY

## 2017-08-29 PROCEDURE — 76818 FETAL BIOPHYS PROFILE W/NST: CPT | Performed by: OBSTETRICS & GYNECOLOGY

## 2017-08-29 RX ORDER — INSULIN GLARGINE 100 [IU]/ML
INJECTION, SOLUTION SUBCUTANEOUS
Qty: 20 PEN | Refills: 3 | Status: SHIPPED | OUTPATIENT
Start: 2017-08-29 | End: 2017-12-30 | Stop reason: SDUPTHER

## 2017-08-29 RX ORDER — INSULIN LISPRO 100 [IU]/ML
INJECTION, SOLUTION INTRAVENOUS; SUBCUTANEOUS
Qty: 9 PACKAGE | Refills: 3 | Status: SHIPPED | OUTPATIENT
Start: 2017-08-29 | End: 2017-12-05 | Stop reason: ALTCHOICE

## 2017-08-29 NOTE — PROGRESS NOTES
CHIEF COMPLAINT: f/u uncontrolled type 2 diabetes    HISTORY OF PRESENT ILLNESS:   Ellen Retana is a 31 y.o. female with a PMHx as noted below who presents for f/u of uncontrolled type 2 diabetes in pregnancy. Patient is currently 33 weeks, aiming for delivery at 37 weeks. Current medications:  Invokana 300mg daily (HELD FOR PREGNANCY)  Metformin 1000mg BID (HELD FOR PREGNANCY)  Lantus 110  Humalog 110  Victoza (HELD FOR PREGNANCY)  Simvastatin 20mg daily (HELD FOR PREGNANCY)  Lisinopril 5mg daily (HELD FOR PREGNANCY)    Review of home glucose:  No log with her today, from memory  AM: 105-190  Lunch:   Dinner: 160-180  Bedtime: 150-190    Having lots of exertional fatigue, pregnancy related. PAST MEDICAL/SURGICAL HISTORY:   Past Medical History:   Diagnosis Date    Diabetes mellitus ([address]     insulin    Hyperlipidemia     Obesity      Past Surgical History:   Procedure Laterality Date     DELIVERY ONLY          HX  SECTION      breech       ALLERGIES:   No Known Allergies    MEDICATIONS ON ADMISSION:     Current Outpatient Prescriptions:     insulin glargine (LANTUS,BASAGLAR) 100 unit/mL (3 mL) inpn, Up to 100 units subcutaneously at each bedtime, Disp: 20 Pen, Rfl: 3    insulin lispro (HUMALOG) 100 unit/mL kwikpen, 100 Units by SubCUTAneous route Before breakfast, lunch, and dinner. Indications: Diabetes in pregnancy, uncontrolled, Disp: 7 Package, Rfl: 3    Blood-Glucose Meter (ONETOUCH VERIO FLEX) misc, Use to test blood sugar 8 times daily, Disp: 1 Each, Rfl: 0    glucose blood VI test strips (ONETOUCH VERIO) strip, Use to test blood sugars 8 times daily, Disp: 100 Strip, Rfl: 11    Insulin Needles, Disposable, (YOUNG PEN NEEDLE) 32 gauge x 5/32\" ndle, Use with insulin pens to inject up to 7 times daily, Disp: 200 Pen Needle, Rfl: 12    famotidine (PEPCID) 20 mg tablet, Take 1 Tab by mouth every twelve (12) hours as needed (Indigestion). , Disp: 20 Tab, Rfl: 0    acetaminophen (TYLENOL) 325 mg tablet, Take 650 mg by mouth every four (4) hours as needed for Pain., Disp: , Rfl:     glucose blood VI test strips (ONE TOUCH VERIO) strip, Use to test blood glucose 5x daily, Disp: 100 Strip, Rfl: 11    PNV with Ca,No.71-Iron-FA 27-1 mg tab, Take  by mouth., Disp: , Rfl:     SOCIAL HISTORY:   Social History     Social History    Marital status:      Spouse name: N/A    Number of children: N/A    Years of education: N/A     Occupational History    Not on file. Social History Main Topics    Smoking status: Never Smoker    Smokeless tobacco: Never Used    Alcohol use No    Drug use: No    Sexual activity: Yes     Partners: Male     Birth control/ protection: None     Other Topics Concern    Not on file     Social History Narrative       FAMILY HISTORY:  Family History   Problem Relation Age of Onset    Diabetes Mother     Diabetes Father     Diabetes Sister     Diabetes Brother        REVIEW OF SYSTEMS: Complete ROS assessed and noted for that which is described above, all else are negative.   Eyes: normal  ENT: normal  CVS: normal  Resp: normal  GI: normal  : normal  GYN: normal  Endocrine: normal  Integument: normal  Musculoskeletal: normal  Neuro: parasthesias of feet  Psych: normal      PHYSICAL EXAMINATION:    VITAL SIGNS:  Visit Vitals    /62 (BP 1 Location: Left arm, BP Patient Position: Sitting)    Pulse (!) 113    Wt 203 lb 6.4 oz (92.3 kg)    LMP 01/10/2017 (Exact Date)    BMI 38.43 kg/m2       GENERAL: NCAT, Sitting comfortably, NAD  EYES: EOMI, non-icteric, no proptosis  Ear/Nose/Throat: NCAT, no inflammation, no masses  LYMPH NODES: No LAD  CARDIOVASCULAR: S1 S2, RRR, No murmur  RESPIRATORY: CTA b/l, no wheeze/rales  GASTROINTESTINAL:  ND  MUSCULOSKELETAL: Normal ROM, no atrophy  SKIN: warm, no edema/rash/ or other skin changes  NEUROLOGIC: 5/5 power all extremities, no tremor, AAOx3  PSYCHIATRIC: Normal affect, Normal insight and judgement      REVIEW OF LABORATORY AND RADIOLOGY DATA:   Labs and documentation have been reviewed as described above. ASSESSMENT AND PLAN:   Dominique Brown is a 31 y.o. female with a PMHx as noted above who presents for f/u of uncontrolled type 2 diabetes. Type 2 diabetes Uncontrolled in Pregnancy  Hyperlipidemia  Hypertension  Vitamin D deficiency    Slightly better control, still going up on doses, another aggressive dose adjustment today. Plan: Type 2 Diabetes  Medications:  Lantus: Increase to 110 units  Increase Humalog 110 / 125 / 125  1:15 correction scale in addition to the standing dose above. Off invokana, metformin, and victoza while pregnant  Check glucose ACHS (Need to check bedtime blood sugars also)  Goal Fasting blood sugar <90  Goal premeal blood sugar <120    HTN: BP stable, HOLD lisinopril 5mg FOR PREGNANCY  HLD:  HOLD Simvastatin 20mg daily FOR PREGNANCY  Vitamin D Deficiency: Continue 2000 units of D3 daily, should be on prenatal vitamins    RTC: 2 week f/u visit    >25 minutes spent together with patient today of which >50% of this time was spent in counseling and coordination of care. Jamia Nesbitt.  [address] [facility] Diabetes & Endocrinology

## 2017-08-29 NOTE — PATIENT INSTRUCTIONS
Lantus: Increase to 110 units  Increase Humalog 110 / 125 / 125    ----------------------------------------------------------------------------------------------------------------------    Below you will find a glucose log sheet which you can use to record your blood sugars. Without checking and recording what your home glucose levels are, it will be difficult to make any changes to your medication dose, even when significant changes may be needed. Please feel free to use the log below to record your home glucose levels. At the very least, I would like for you to login the entire 2-3 weeks just before your visit so we can make your visit much more productive and beneficial to you. GLUCOSE LOG SHEET:    Date Breakfast Lunch Dinner Bedtime Comments ? GLUCOSE LOG SHEET:    Date Breakfast Lunch Dinner Bedtime Comments ? GLUCOSE LOG SHEET:    Date Breakfast Lunch Dinner Bedtime Comments ?

## 2017-08-29 NOTE — MR AVS SNAPSHOT
Visit Information Date & Time Provider Department Dept. Phone Encounter #  
 8/29/2017 10:10 AM Adina Potter, [address] [place] [address] Diabetes and Endocrinology [phone] [id number] Your Appointments 9/12/2017  9:50 AM  
Follow Up with MD Dougie Teresa Diabetes and Endocrinology [facility]) [employer] [address] [phone] [address]  
  
    
 9/26/2017  9:50 AM  
Follow Up with MD Dougie Teresa Diabetes and Endocrinology [facility]) Appt Note: 2 week f/u Diabetes  (OK per Dr. Dayton Hay) [employer] [address] [address] [phone] Upcoming Health Maintenance Date Due  
 EYE EXAM RETINAL OR DILATED Q1 12/6/1992 Pneumococcal 19-64 Medium Risk (1 of 1 - PPSV23) 12/6/2001 INFLUENZA AGE 9 TO ADULT 8/1/2017 FOOT EXAM Q1 9/13/2017 MICROALBUMIN Q1 11/16/2017 LIPID PANEL Q1 11/16/2017 HEMOGLOBIN A1C Q6M 2/15/2018 PAP AKA CERVICAL CYTOLOGY 2/13/2020 DTaP/Tdap/Td series (2 - Td) 7/24/2027 Allergies as of 8/29/2017  Review Complete On: 8/29/2017 By: Adina Potter MD  
 No Known Allergies Current Immunizations  Reviewed on 9/5/2014 Name Date Influenza High Dose Vaccine PF 10/1/2013 Tdap 7/24/2017  1:29 PM  
  
 Not reviewed this visit You Were Diagnosed With   
  
 Codes Comments Diabetes mellitus complicating pregnancy, second trimester    -  Primary ICD-10-CM: O24.912 ICD-9-CM: 648.03, 250.00 Hyperlipidemia, unspecified hyperlipidemia type     ICD-10-CM: E78.5 ICD-9-CM: 272.4 Essential hypertension with goal blood pressure less than 130/80     ICD-10-CM: I10 
ICD-9-CM: 401.9 Vitals BP Pulse Weight(growth percentile) LMP BMI OB Status  115/62 (BP 1 Location: Left arm, BP Patient Position: Sitting) (!) 113 203 lb 6.4 oz (92.3 kg) 01/10/2017 (Exact Date) 38.43 kg/m2 Pregnant Smoking Status Never Smoker BMI and BSA Data Body Mass Index Body Surface Area  
 38.43 kg/m 2 1.99 m 2 Preferred Pharmacy Pharmacy Name Phone [facility] [address] [address] Your Updated Medication List  
  
   
This list is accurate as of: 8/29/17 10:30 AM.  Always use your most recent med list.  
  
  
  
  
 Blood-Glucose Meter Misc Commonly known as:  ONETOUCH VERIO FLEX Use to test blood sugar 8 times daily  
  
 famotidine 20 mg tablet Commonly known as:  PEPCID Take 1 Tab by mouth every twelve (12) hours as needed (Indigestion). * glucose blood VI test strips strip Commonly known as:  Wolm Litten Use to test blood glucose 5x daily * glucose blood VI test strips strip Commonly known as:  Wolm Litten Use to test blood sugars 8 times daily  
  
 insulin glargine 100 unit/mL (3 mL) Inpn Commonly known as:  Caye Huge Up to 120 units subcutaneously at each bedtime  
  
 insulin lispro 100 unit/mL kwikpen Commonly known as:  HUMALOG Up to 140 units with each meals  Indications: Diabetes in pregnancy, uncontrolled Insulin Needles (Disposable) 32 gauge x 5/32\" Ndle Commonly known as:  Anita Pen Needle Use with insulin pens to inject up to 7 times daily PNV with Ca,No.71-Iron-FA 27-1 mg Tab Take  by mouth. TYLENOL 325 mg tablet Generic drug:  acetaminophen Take 650 mg by mouth every four (4) hours as needed for Pain. * Notice: This list has 2 medication(s) that are the same as other medications prescribed for you. Read the directions carefully, and ask your doctor or other care provider to review them with you. Prescriptions Printed  Refills  
 insulin lispro (HUMALOG) 100 unit/mL kwikpen 3  
 Sig: Up to 140 units with each meals  Indications: Diabetes in pregnancy, uncontrolled Class: Print  
 insulin glargine (LANTUS,BASAGLAR) 100 unit/mL (3 mL) inpn 3 Sig: Up to 120 units subcutaneously at each bedtime Class: Print To-Do List   
 08/29/2017 11:00 AM  
  Appointment with ULTRASOUND 1 [facility] at [address] [place] [address] [phone] ([phone]) Patient Instructions Lantus: Increase to 110 units Increase Humalog 110 / 125 / 125 
 
---------------------------------------------------------------------------------------------------------------------- Below you will find a glucose log sheet which you can use to record your blood sugars. Without checking and recording what your home glucose levels are, it will be difficult to make any changes to your medication dose, even when significant changes may be needed. Please feel free to use the log below to record your home glucose levels. At the very least, I would like for you to login the entire 2-3 weeks just before your visit so we can make your visit much more productive and beneficial to you. GLUCOSE LOG SHEET: 
 
Date Breakfast Lunch Dinner Bedtime Comments ? GLUCOSE LOG SHEET: 
 
Date Breakfast Lunch Dinner Bedtime Comments ? GLUCOSE LOG SHEET: 
 
Date Breakfast Lunch Dinner Bedtime Comments ? Introducing [facility]! [employer] introduces [employer] patient portal. Now you can access parts of your medical record, email your doctor's office, and request medication refills online. 1. In your internet browser, go to [email or web address] 2. Click on the First Time User? Click Here link in the Sign In box. You will see the New Member Sign Up page. 3. Enter your [employer] Access Code exactly as it appears below. You will not need to use this code after youve completed the sign-up process. If you do not sign up before the expiration date, you must request a new code. · [employer] Access Code: [email or web address] Expires: 10/9/2017 10:18 AM 
 
4. Enter the last four digits of your Social Security Number (xxxx) and Date of Birth (mm/dd/yyyy) as indicated and click Submit. You will be taken to the next sign-up page. 5. Create a [employer] ID. This will be your [employer] login ID and cannot be changed, so think of one that is secure and easy to remember. 6. Create a [employer] password. You can change your password at any time. 7. Enter your Password Reset Question and Answer. This can be used at a later time if you forget your password. 8. Enter your e-mail address. You will receive e-mail notification when new information is available in [address] 9. Click Sign Up. You can now view and download portions of your medical record. 10. Click the Download Summary menu link to download a portable copy of your medical information. If you have questions, please visit the Frequently Asked Questions section of the [employer] website. Remember, [employer] is NOT to be used for urgent needs. For medical emergencies, dial 911. Now available from your iPhone and Android! Please provide this summary of care documentation to your next provider. Your primary care clinician is listed as Jes Pollard.  If you have any questions after today's visit, please call [phone].

## 2017-09-05 ENCOUNTER — HOSPITAL ENCOUNTER (OUTPATIENT)
Dept: PERINATAL CARE | Age: 37
Discharge: HOME | End: 2017-09-05
Attending: OBSTETRICS & GYNECOLOGY
Payer: MEDICAID

## 2017-09-05 PROCEDURE — 76818 FETAL BIOPHYS PROFILE W/NST: CPT | Performed by: OBSTETRICS & GYNECOLOGY

## 2017-09-11 ENCOUNTER — TELEPHONE (OUTPATIENT)
Dept: ENDOCRINOLOGY | Age: 37
End: 2017-09-11

## 2017-09-11 NOTE — TELEPHONE ENCOUNTER
----- Message from Rivera Saunders sent at 9/11/2017  9:59 AM EDT -----  Regarding: Physician Call  Dr. Caridad Larkin, at [facility], called to talk to you about how to manage this patient's diabetes, now that she has delivered? Dr. Mercedes Roberts can be reached at:  [phone].  (cell phone)

## 2017-09-11 NOTE — TELEPHONE ENCOUNTER
Spoke with Dr. Shanae Oropeza,   Patient had a rupture overnight,  very early this AM.   We had discussion about her blood sugar control considering her very high insulin doses. She just came from recovery and not updated blood sugars. Last glucose obtained was in 120's based on recent home doses taken. I suspect she will be less resistant and will require quite a bit less insulin. We will take a conservative approach and readjust as needed. Lantus 40 units  Humalog 20 units with meals  Will likely need to be adjusted,  Advised I will call her tomorrow afternoon after 1PM to check in and recommend any further changes. Juan F Arita.  [address] Endocrinology  [address] [place] [address]

## 2017-09-12 ENCOUNTER — TELEPHONE (OUTPATIENT)
Dept: ENDOCRINOLOGY | Age: 37
End: 2017-09-12

## 2017-09-12 NOTE — TELEPHONE ENCOUNTER
Spoke again today with Dr. Bc Shah,  Reviewed patients blood sugars: Following : 140  3:52 PM: 167  10:17 PM: 141    Received 40 U lantus at bedtime    This AM: 7:41 AM  205  1:18 PM:  223    Recommended the following:   Increase lantus to 60 units  Increase Humalog/Novolog to 26 units TID with meals  Start medium scale correction insulin    Welcomed to call with questions or concerns,    Maria C Nunez.  [address] [facility] Endocrinology  [address] [place] [address]

## 2017-09-26 ENCOUNTER — OFFICE VISIT (OUTPATIENT)
Dept: ENDOCRINOLOGY | Age: 37
End: 2017-09-26

## 2017-09-26 VITALS
HEART RATE: 98 BPM | WEIGHT: 194.2 LBS | SYSTOLIC BLOOD PRESSURE: 125 MMHG | BODY MASS INDEX: 36.67 KG/M2 | DIASTOLIC BLOOD PRESSURE: 82 MMHG | HEIGHT: 61 IN

## 2017-09-26 DIAGNOSIS — E11.9 TYPE 2 DIABETES MELLITUS WITHOUT COMPLICATION, WITH LONG-TERM CURRENT USE OF INSULIN (HCC): Primary | ICD-10-CM

## 2017-09-26 DIAGNOSIS — E78.5 HYPERLIPIDEMIA, UNSPECIFIED HYPERLIPIDEMIA TYPE: ICD-10-CM

## 2017-09-26 DIAGNOSIS — Z79.4 TYPE 2 DIABETES MELLITUS WITHOUT COMPLICATION, WITH LONG-TERM CURRENT USE OF INSULIN (HCC): Primary | ICD-10-CM

## 2017-09-26 DIAGNOSIS — I10 ESSENTIAL HYPERTENSION WITH GOAL BLOOD PRESSURE LESS THAN 130/80: ICD-10-CM

## 2017-09-26 RX ORDER — SIMVASTATIN 20 MG/1
20 TABLET, FILM COATED ORAL
Qty: 90 TAB | Refills: 3 | Status: SHIPPED | OUTPATIENT
Start: 2017-09-26 | End: 2021-11-10 | Stop reason: ALTCHOICE

## 2017-09-26 RX ORDER — METFORMIN HYDROCHLORIDE 500 MG/1
1000 TABLET, EXTENDED RELEASE ORAL
Qty: 90 TAB | Refills: 3 | Status: SHIPPED | OUTPATIENT
Start: 2017-09-26 | End: 2017-12-05 | Stop reason: SDUPTHER

## 2017-09-26 RX ORDER — LISINOPRIL 5 MG/1
5 TABLET ORAL DAILY
Qty: 90 TAB | Refills: 3 | Status: SHIPPED | OUTPATIENT
Start: 2017-09-26

## 2017-09-26 NOTE — MR AVS SNAPSHOT
Visit Information Date & Time Provider Department Dept. Phone Encounter #  
 9/26/2017  9:50 AM Dipti Harvey, [address] [place] [address] Diabetes and Endocrinology [phone] [id number] Upcoming Health Maintenance Date Due  
 EYE EXAM RETINAL OR DILATED Q1 12/6/1992 Pneumococcal 19-64 Medium Risk (1 of 1 - PPSV23) 12/6/2001 INFLUENZA AGE 9 TO ADULT 8/1/2017 FOOT EXAM Q1 9/13/2017 MICROALBUMIN Q1 11/16/2017 LIPID PANEL Q1 11/16/2017 HEMOGLOBIN A1C Q6M 2/15/2018 PAP AKA CERVICAL CYTOLOGY 2/13/2020 DTaP/Tdap/Td series (2 - Td) 7/24/2027 Allergies as of 9/26/2017  Review Complete On: 9/26/2017 By: Dipti Harvey MD  
 No Known Allergies Current Immunizations  Reviewed on 9/5/2014 Name Date Influenza High Dose Vaccine PF 10/1/2013 Tdap 7/24/2017  1:29 PM  
  
 Not reviewed this visit You Were Diagnosed With   
  
 Codes Comments Type 2 diabetes mellitus without complication, with long-term current use of insulin (HCC)    -  Primary ICD-10-CM: E11.9, Z79.4 ICD-9-CM: 250.00, V58.67 Hyperlipidemia, unspecified hyperlipidemia type     ICD-10-CM: E78.5 ICD-9-CM: 272.4 Essential hypertension with goal blood pressure less than 130/80     ICD-10-CM: I10 
ICD-9-CM: 401.9 Vitals BP Pulse Height(growth percentile) Weight(growth percentile) LMP BMI  
 125/82 (BP 1 Location: Left arm, BP Patient Position: Sitting) 98 5' 1\" (1.549 m) 194 lb 3.2 oz (88.1 kg) 01/10/2017 (Exact Date) 36.69 kg/m2 OB Status Smoking Status Pregnant Never Smoker BMI and BSA Data Body Mass Index Body Surface Area  
 36.69 kg/m 2 1.95 m 2 Preferred Pharmacy Pharmacy Name Phone [facility] [address] [place] [address] Your Updated Medication List  
  
   
This list is accurate as of: 9/26/17 10:21 AM.  Always use your most recent med list.  
  
  
  
  
 Blood-Glucose Meter Misc Commonly known as:  ONETOUCH VERIO FLEX Use to test blood sugar 8 times daily * glucose blood VI test strips strip Commonly known as:  Zulay Pond Use to test blood glucose 5x daily * glucose blood VI test strips strip Commonly known as:  [place]ond Use to test blood sugars 8 times daily  
  
 insulin glargine 100 unit/mL (3 mL) Inpn Commonly known as:  Rosa Rank Up to 120 units subcutaneously at each bedtime  
  
 insulin lispro 100 unit/mL kwikpen Commonly known as:  HUMALOG Up to 140 units with each meals  Indications: Diabetes in pregnancy, uncontrolled Insulin Needles (Disposable) 32 gauge x \" Ndle Commonly known as:  Anita Pen Needle Use with insulin pens to inject up to 7 times daily Liraglutide 0.6 mg/0.1 mL (18 mg/3 mL) Pnij Commonly known as:  VICTOZA 3-DEBRA  
1.8 mg by SubCUTAneous route daily. Titrate up to 1.8mg daily as direcected  
  
 lisinopril 5 mg tablet Commonly known as:  Valene Pencil Take 1 Tab by mouth daily. metFORMIN  mg tablet Commonly known as:  GLUCOPHAGE XR Take 2 Tabs by mouth Before breakfast and dinner. PNV with Ca,No.71-Iron-FA 27-1 mg Tab Take  by mouth. simvastatin 20 mg tablet Commonly known as:  ZOCOR Take 1 Tab by mouth nightly. TYLENOL 325 mg tablet Generic drug:  acetaminophen Take 650 mg by mouth every four (4) hours as needed for Pain. * Notice: This list has 2 medication(s) that are the same as other medications prescribed for you. Read the directions carefully, and ask your doctor or other care provider to review them with you. Prescriptions Printed Refills Liraglutide (VICTOZA 3-DEBRA) 0.6 mg/0.1 mL (18 mg/3 mL) pnij 3 Si.8 mg by SubCUTAneous route daily. Titrate up to 1.8mg daily as direcected Class: Print Route: SubCUTAneous  
 simvastatin (ZOCOR) 20 mg tablet 3 Sig: Take 1 Tab by mouth nightly. Class: Print Route: Oral  
 lisinopril (PRINIVIL, ZESTRIL) 5 mg tablet 3 Sig: Take 1 Tab by mouth daily. Class: Print Route: Oral  
 metFORMIN ER (GLUCOPHAGE XR) 500 mg tablet 3 Sig: Take 2 Tabs by mouth Before breakfast and dinner. Class: Print Route: Oral  
  
Patient Instructions Lantus 65 units Metformin 1000mg twice daily (2 of the 500mg twice daily) Stop humalog when you start the victoza Starting Victoza: 
Start by taking 0.6mg once daily for 1 week, Then 1.2mg daily for 1 week, Then 1.8mg daily thereafter. Restarting simvastatin each evening, 20 mg, for your cholesterol, Restarting lisinopril 5mg for your kidneys 
 
---------------------------------------------------------------------------------------------------------------------- Below you will find a glucose log sheet which you can use to record your blood sugars. Without checking and recording what your home glucose levels are, it will be difficult to make any changes to your medication dose, even when significant changes may be needed. Please feel free to use the log below to record your home glucose levels. At the very least, I would like for you to login the entire 2-3 weeks just before your visit so we can make your visit much more productive and beneficial to you. GLUCOSE LOG SHEET: 
 
Date Breakfast Lunch Dinner Bedtime Comments ? GLUCOSE LOG SHEET: 
 
Date Breakfast Lunch Dinner Bedtime Comments ? GLUCOSE LOG SHEET: 
 
 Date Breakfast Lunch Dinner Bedtime Comments ? Introducing [facility] & HEALTH SERVICES! Zaida Franceses introduces [employer] patient portal. Now you can access parts of your medical record, email your doctor's office, and request medication refills online. 1. In your internet browser, go to [email or web address] 2. Click on the First Time User? Click Here link in the Sign In box. You will see the New Member Sign Up page. 3. Enter your N[employer] Access Code exactly as it appears below. You will not need to use this code after youve completed the sign-up process. If you do not sign up before the expiration date, you must request a new code. · [employer] Access Code: [email or web address] Expires: 10/9/2017 10:18 AM 
 
4. Enter the last four digits of your Social Security Number (xxxx) and Date of Birth (mm/dd/yyyy) as indicated and click Submit. You will be taken to the next sign-up page. 5. Create a Nasty [employer] ID. This will be your [employer] login ID and cannot be changed, so think of one that is secure and easy to remember. 6. Create a N[employer] password. You can change your password at any time. 7. Enter your Password Reset Question and Answer. This can be used at a later time if you forget your password. 8. Enter your e-mail address. You will receive e-mail notification when new information is available in [address] 9. Click Sign Up. You can now view and download portions of your medical record. 10. Click the Download Summary menu link to download a portable copy of your medical information. If you have questions, please visit the Frequently Asked Questions section of the [employer] website.  Remember, [employer] is NOT to be used for urgent needs. For medical emergencies, dial 911. Now available from your iPhone and Android! Please provide this summary of care documentation to your next provider. Your primary care clinician is listed as Rocky Emanuel. If you have any questions after today's visit, please call [phone].

## 2017-09-26 NOTE — PROGRESS NOTES
CHIEF COMPLAINT: f/u uncontrolled type 2 diabetes    HISTORY OF PRESENT ILLNESS:   Lord Rosas is a 31 y.o. female with a PMHx as noted below who presents for f/u of uncontrolled type 2 diabetes in pregnancy. Patient has delivered her baby, she is on a reduced dose of insulin as we had recommended following deliver.      Current medications:  Invokana 300mg daily (HELD FOR PREGNANCY)  Metformin 1000mg BID (HELD FOR PREGNANCY)  Lantus 65 units  Humalog 40 units  Victoza (HELD FOR PREGNANCY)  Simvastatin 20mg daily (HELD FOR PREGNANCY)  Lisinopril 5mg daily (HELD FOR PREGNANCY)    Review of home glucose:   AM: 180-202  Lunch: 181-260  Dinner: 130-180    Review of most recent diabetes-related labs:  Lab Results   Component Value Date    HBA1C 7.9 (H) 2017    HBA1C 9.9 (H) 2016    HBA1C 9.7 (H) 10/14/2015    FWY4ATSU 7.1 08/15/2017    NKW7LIME 7.5 2017    PBN0JZRC 7.4 2017    CHOL 246 (H) 2016    LDLC 158 (H) 2016    GFRAA >60 2017    GFRNA >60 2017    MCACR 14.3 2016    VITD3 13.9 (L) 2016               PAST MEDICAL/SURGICAL HISTORY:   Past Medical History:   Diagnosis Date    Diabetes mellitus ([facility] [address] 2011    insulin    Hyperlipidemia     Obesity      Past Surgical History:   Procedure Laterality Date     DELIVERY ONLY          HX  SECTION      breech       ALLERGIES:   No Known Allergies    MEDICATIONS ON ADMISSION:     Current Outpatient Prescriptions:     insulin lispro (HUMALOG) 100 unit/mL kwikpen, Up to 140 units with each meals  Indications: Diabetes in pregnancy, uncontrolled, Disp: 9 Package, Rfl: 3    insulin glargine (LANTUS,BASAGLAR) 100 unit/mL (3 mL) inpn, Up to 120 units subcutaneously at each bedtime, Disp: 20 Pen, Rfl: 3    Blood-Glucose Meter (ONETOUCH VERIO FLEX) misc, Use to test blood sugar 8 times daily, Disp: 1 Each, Rfl: 0    glucose blood VI test strips (ONETOUCH VERIO) strip, Use to test blood sugars 8 times daily, Disp: 100 Strip, Rfl: 11    Insulin Needles, Disposable, (YOUNG PEN NEEDLE) 32 gauge x 5/32\" ndle, Use with insulin pens to inject up to 7 times daily, Disp: 200 Pen Needle, Rfl: 12    acetaminophen (TYLENOL) 325 mg tablet, Take 650 mg by mouth every four (4) hours as needed for Pain., Disp: , Rfl:     glucose blood VI test strips (ONE TOUCH VERIO) strip, Use to test blood glucose 5x daily, Disp: 100 Strip, Rfl: 11    PNV with Ca,No.71-Iron-FA 27-1 mg tab, Take  by mouth., Disp: , Rfl:     SOCIAL HISTORY:   Social History     Social History    Marital status:      Spouse name: N/A    Number of children: N/A    Years of education: N/A     Occupational History    Not on file. Social History Main Topics    Smoking status: Never Smoker    Smokeless tobacco: Never Used    Alcohol use No    Drug use: No    Sexual activity: Yes     Partners: Male     Birth control/ protection: None     Other Topics Concern    Not on file     Social History Narrative       FAMILY HISTORY:  Family History   Problem Relation Age of Onset    Diabetes Mother     Diabetes Father     Diabetes Sister     Diabetes Brother        REVIEW OF SYSTEMS: Complete ROS assessed and noted for that which is described above, all else are negative.   Eyes: normal  ENT: normal  CVS: normal  Resp: normal  GI: normal  : normal  GYN: normal  Endocrine: normal  Integument: normal  Musculoskeletal: normal  Neuro: parasthesias of feet  Psych: normal      PHYSICAL EXAMINATION:    VITAL SIGNS:  Visit Vitals    /82 (BP 1 Location: Left arm, BP Patient Position: Sitting)    Pulse 98    Ht 5' 1\" (1.549 m)    Wt 194 lb 3.2 oz (88.1 kg)    LMP 01/10/2017 (Exact Date)    BMI 36.69 kg/m2       GENERAL: NCAT, Sitting comfortably, NAD  EYES: EOMI, non-icteric, no proptosis  Ear/Nose/Throat: NCAT, no inflammation, no masses  LYMPH NODES: No LAD  CARDIOVASCULAR: S1 S2, RRR, No murmur  RESPIRATORY: CTA b/l, no wheeze/rales  GASTROINTESTINAL:  ND  MUSCULOSKELETAL: Normal ROM, no atrophy  SKIN: warm, no edema/rash/ or other skin changes  NEUROLOGIC: 5/5 power all extremities, no tremor, AAOx3  PSYCHIATRIC: Normal affect, Normal insight and judgement      REVIEW OF LABORATORY AND RADIOLOGY DATA:   Labs and documentation have been reviewed as described above. ASSESSMENT AND PLAN:   Lord Rosas is a 31 y.o. female with a PMHx as noted above who presents for f/u of uncontrolled type 2 diabetes. Type 2 diabetes Uncontrolled  Hyperlipidemia  Hypertension  Vitamin D deficiency    Patient is not planning to breast feed and would like to restart her victoza regimen she was on prior to her pregnancy. She was doing well prior to pregnancy on her prior regimen. We will also reassess her lipids on next visit. Patient is advised that if for any reason she decides to start breastfeeding we will need to know immediately as these medications are to be discontinued right away. She demonstrated her understanding. Discussed the need for continued carb control. Plan: Type 2 Diabetes  Medications:  Lantus: 65 units daily  Stop Humalog 40 units TID when starting Victoza  Start Victoza 1.8mg daily  Start metformin, 1000mg BID  Was on invokana prior to pregnancy, will not restart. HTN: BP stable, Will restart 5mg of lisinopril   HLD:  Will restart 20mg simvastatin prior to pregnancy, will restart it  Vitamin D Deficiency: Continue 2000 units of D3 daily    RTC: 6 weeks      Cyril Valdez T.  [address] [facility] Diabetes & Endocrinology

## 2017-09-26 NOTE — PATIENT INSTRUCTIONS
Lantus 65 units  Metformin 1000mg twice daily (2 of the 500mg twice daily)  Stop humalog when you start the victoza    Starting Victoza:  Start by taking 0.6mg once daily for 1 week,   Then 1.2mg daily for 1 week,   Then 1.8mg daily thereafter. Restarting simvastatin each evening, 20 mg, for your cholesterol,  Restarting lisinopril 5mg for your kidneys    ----------------------------------------------------------------------------------------------------------------------    Below you will find a glucose log sheet which you can use to record your blood sugars. Without checking and recording what your home glucose levels are, it will be difficult to make any changes to your medication dose, even when significant changes may be needed. Please feel free to use the log below to record your home glucose levels. At the very least, I would like for you to login the entire 2-3 weeks just before your visit so we can make your visit much more productive and beneficial to you. GLUCOSE LOG SHEET:    Date Breakfast Lunch Dinner Bedtime Comments ? GLUCOSE LOG SHEET:    Date Breakfast Lunch Dinner Bedtime Comments ? GLUCOSE LOG SHEET:    Date Breakfast Lunch Dinner Bedtime Comments ?

## 2017-10-04 ENCOUNTER — TELEPHONE (OUTPATIENT)
Dept: ENDOCRINOLOGY | Age: 37
End: 2017-10-04

## 2017-10-04 NOTE — TELEPHONE ENCOUNTER
Patient notably rejected for Victoza by [facility] CUSHING Plan. Notably the document indicates that Malinda Packeron is only considered in patients who failed a 30 day trial of trulicity. Therfor I ordered trulicity 2.8OD to be taken once weekly only. This will take the place of victoza. Axel Howe.  [address]

## 2017-10-05 NOTE — TELEPHONE ENCOUNTER
----- Message from Romaine Rojas sent at 10/5/2017 10:27 AM EDT -----  Regarding: Dr. Ankit Sheehan  Pt returning missed call from the nurse. Best contact number [phone].

## 2017-10-05 NOTE — TELEPHONE ENCOUNTER
10/5/2017, 8:57 AM    Attempted to call Veronica Tavera, reached voice mail. I left a message to return my call.       DGSE

## 2017-10-05 NOTE — TELEPHONE ENCOUNTER
I returned Angelica's call and let her know that now I am working on a P/A for the Trulicity.    Rayna Stein

## 2017-10-17 ENCOUNTER — TELEPHONE (OUTPATIENT)
Dept: ENDOCRINOLOGY | Age: 37
End: 2017-10-17

## 2017-12-05 ENCOUNTER — OFFICE VISIT (OUTPATIENT)
Dept: ENDOCRINOLOGY | Age: 37
End: 2017-12-05

## 2017-12-05 VITALS
HEART RATE: 88 BPM | DIASTOLIC BLOOD PRESSURE: 75 MMHG | SYSTOLIC BLOOD PRESSURE: 110 MMHG | WEIGHT: 184.2 LBS | HEIGHT: 61 IN | BODY MASS INDEX: 34.78 KG/M2

## 2017-12-05 DIAGNOSIS — E78.5 HYPERLIPIDEMIA, UNSPECIFIED HYPERLIPIDEMIA TYPE: ICD-10-CM

## 2017-12-05 DIAGNOSIS — Z79.4 TYPE 2 DIABETES MELLITUS WITHOUT COMPLICATION, WITH LONG-TERM CURRENT USE OF INSULIN (HCC): Primary | ICD-10-CM

## 2017-12-05 DIAGNOSIS — I10 ESSENTIAL HYPERTENSION WITH GOAL BLOOD PRESSURE LESS THAN 130/80: ICD-10-CM

## 2017-12-05 DIAGNOSIS — E11.9 TYPE 2 DIABETES MELLITUS WITHOUT COMPLICATION, WITH LONG-TERM CURRENT USE OF INSULIN (HCC): Primary | ICD-10-CM

## 2017-12-05 LAB — HBA1C MFR BLD HPLC: 9.6 %

## 2017-12-05 RX ORDER — METFORMIN HYDROCHLORIDE 500 MG/1
1000 TABLET, EXTENDED RELEASE ORAL
Qty: 360 TAB | Refills: 3 | Status: SHIPPED | OUTPATIENT
Start: 2017-12-05 | End: 2018-04-18 | Stop reason: SDUPTHER

## 2017-12-05 RX ORDER — PEN NEEDLE, DIABETIC 31 GX3/16"
NEEDLE, DISPOSABLE MISCELLANEOUS
Qty: 200 PEN NEEDLE | Refills: 7 | Status: SHIPPED | OUTPATIENT
Start: 2017-12-05

## 2017-12-05 NOTE — PATIENT INSTRUCTIONS
Lantus: 65 units daily : Need to take regularly  Victoza 1.8mg daily : Continue  Metformin, 1000mg BID : Restart    ----------------------------------------------------------------------------------------------------------------------    Below you will find a glucose log sheet which you can use to record your blood sugars. Without checking and recording what your home glucose levels are, it will be difficult to make any changes to your medication dose, even when significant changes may be needed. Please feel free to use the log below to record your home glucose levels. At the very least, I would like for you to login the entire 2-3 weeks just before your visit so we can make your visit much more productive and beneficial to you. GLUCOSE LOG SHEET:    Date Breakfast Lunch Dinner Bedtime Comments ? GLUCOSE LOG SHEET:    Date Breakfast Lunch Dinner Bedtime Comments ? GLUCOSE LOG SHEET:    Date Breakfast Lunch Dinner Bedtime Comments ?

## 2017-12-05 NOTE — MR AVS SNAPSHOT
Visit Information Date & Time Provider Department Dept. Phone Encounter #  
 12/5/2017 10:30 AM Abdulaziz Arango, [address] [facility] Diabetes and Endocrinology [phone] [id number] Follow-up Instructions Return in about 6 weeks (around 1/16/2018). Upcoming Health Maintenance Date Due  
 EYE EXAM RETINAL OR DILATED Q1 12/6/1992 Pneumococcal 19-64 Medium Risk (1 of 1 - PPSV23) 12/6/2001 Influenza Age 5 to Adult 8/1/2017 FOOT EXAM Q1 9/13/2017 MICROALBUMIN Q1 11/16/2017 LIPID PANEL Q1 11/16/2017 HEMOGLOBIN A1C Q6M 2/15/2018 PAP AKA CERVICAL CYTOLOGY 2/13/2020 DTaP/Tdap/Td series (2 - Td) 7/24/2027 Allergies as of 12/5/2017  Review Complete On: 12/5/2017 By: Chriss Torres No Known Allergies Current Immunizations  Reviewed on 9/5/2014 Name Date Influenza High Dose Vaccine PF 10/1/2013 Tdap 7/24/2017  1:29 PM  
  
 Not reviewed this visit You Were Diagnosed With   
  
 Codes Comments Type 2 diabetes mellitus without complication, with long-term current use of insulin (HCC)    -  Primary ICD-10-CM: E11.9, Z79.4 ICD-9-CM: 250.00, V58.67 Hyperlipidemia, unspecified hyperlipidemia type     ICD-10-CM: E78.5 ICD-9-CM: 272.4 Essential hypertension with goal blood pressure less than 130/80     ICD-10-CM: I10 
ICD-9-CM: 401.9 Vitals BP Pulse Height(growth percentile) Weight(growth percentile) LMP BMI  
 110/75 (BP 1 Location: Left arm, BP Patient Position: Sitting) 88 5' 1\" (1.549 m) 184 lb 3.2 oz (83.6 kg) 01/10/2017 (Exact Date) 34.8 kg/m2 OB Status Smoking Status Pregnant Never Smoker BMI and BSA Data Body Mass Index Body Surface Area 34.8 kg/m 2 1.9 m 2 Preferred Pharmacy Pharmacy Name Phone [facility] [address] [place] [address] Your Updated Medication List  
  
   
This list is accurate as of: 12/5/17 11:02 AM.  Always use your most recent med list.  
  
  
  
  
 Blood-Glucose Meter Misc Commonly known as:  ONETOUCH VERIO FLEX Use to test blood sugar 8 times daily  
  
 glucose blood VI test strips strip Commonly known as:  Amirah Locket Use to test blood glucose 5x daily  
  
 insulin glargine 100 unit/mL (3 mL) Inpn Commonly known as:  Mari Roman Up to 120 units subcutaneously at each bedtime Insulin Needles (Disposable) 32 gauge x 5/32\" Ndle Commonly known as:  Anita Pen Needle Use with insulin pens to inject up to 4 times daily Liraglutide 0.6 mg/0.1 mL (18 mg/3 mL) Pnij Commonly known as:  VICTOZA 3-DEBRA  
1.8 mg by SubCUTAneous route daily. Titrate up to 1.8mg daily as direcected  
  
 lisinopril 5 mg tablet Commonly known as:  Arnold Files Take 1 Tab by mouth daily. metFORMIN  mg tablet Commonly known as:  GLUCOPHAGE XR Take 2 Tabs by mouth Before breakfast and dinner. PNV with Ca,No.71-Iron-FA 27-1 mg Tab Take  by mouth. simvastatin 20 mg tablet Commonly known as:  ZOCOR Take 1 Tab by mouth nightly. TYLENOL 325 mg tablet Generic drug:  acetaminophen Take 650 mg by mouth every four (4) hours as needed for Pain. Prescriptions Printed Refills Liraglutide (VICTOZA 3-DEBRA) 0.6 mg/0.1 mL (18 mg/3 mL) pnij 3 Si.8 mg by SubCUTAneous route daily. Titrate up to 1.8mg daily as direcected Class: Print Route: SubCUTAneous Prescriptions Sent to Pharmacy Refills  
 metFORMIN ER (GLUCOPHAGE XR) 500 mg tablet 3 Sig: Take 2 Tabs by mouth Before breakfast and dinner. Class: Normal  
 Pharmacy: [facility] [address], [address] Ph #: [phone] Route: Oral  
 glucose blood VI test strips (ONETOUCH VERIO) strip 11 Sig: Use to test blood glucose 5x daily Class: Normal  
 Pharmacy: [facility] [address], [address] Ph #: [phone] Insulin Needles, Disposable, (YOUNG PEN NEEDLE) 32 gauge x 5/32\" ndle 7 Sig: Use with insulin pens to inject up to 4 times daily Class: Normal  
 Pharmacy: [id number], [place]  #: [phone] We Performed the Following AMB POC HEMOGLOBIN A1C [97389 CPT(R)]  DIABETES FOOT EXAM [HM7 Custom] LIPID PANEL [20058 CPT(R)] METABOLIC PANEL, COMPREHENSIVE [25092 CPT(R)] MICROALBUMIN, UR, RAND W/ MICROALBUMIN/CREA RATIO [id number] CPT(R)] Follow-up Instructions Return in about 6 weeks (around 1/16/2018). Patient Instructions Lantus: 65 units daily : Need to take regularly Victoza 1.8mg daily : Continue Metformin, 1000mg BID : Restart 
 
---------------------------------------------------------------------------------------------------------------------- Below you will find a glucose log sheet which you can use to record your blood sugars. Without checking and recording what your home glucose levels are, it will be difficult to make any changes to your medication dose, even when significant changes may be needed. Please feel free to use the log below to record your home glucose levels. At the very least, I would like for you to login the entire 2-3 weeks just before your visit so we can make your visit much more productive and beneficial to you. GLUCOSE LOG SHEET: 
 
Date Breakfast Lunch Dinner Bedtime Comments ? GLUCOSE LOG SHEET: 
 
Date Breakfast Lunch Dinner Bedtime Comments ? GLUCOSE LOG SHEET: 
 
Date Breakfast Lunch Dinner Bedtime Comments ? Introducing [facility] & HEALTH SERVICES! [facility] introduces [employer] patient portal. Now you can access parts of your medical record, email your doctor's office, and request medication refills online. 1. In your internet browser, go to [email or web address] 2. Click on the First Time User? Click Here link in the Sign In box. You will see the New Member Sign Up page. 3. Enter your [employer] Access Code exactly as it appears below. You will not need to use this code after youve completed the sign-up process. If you do not sign up before the expiration date, you must request a new code. · [employer] Access Code: [email or web address] Expires: 3/5/2018 10:55 AM 
 
4. Enter the last four digits of your Social Security Number (xxxx) and Date of Birth (mm/dd/yyyy) as indicated and click Submit. You will be taken to the next sign-up page. 5. Create a [employer]od ID. This will be your [employer] login ID and cannot be changed, so think of one that is secure and easy to remember. 6. Create a [employer]od password. You can change your password at any time. 7. Enter your Password Reset Question and Answer. This can be used at a later time if you forget your password. 8. Enter your e-mail address. You will receive e-mail notification when new information is available in [address] 9. Click Sign Up. You can now view and download portions of your medical record. 10. Click the Download Summary menu link to download a portable copy of your medical information.  
 
If you have questions, please visit the Frequently Asked Questions section of the [employer]. Remember, OpenTablehart is NOT to be used for urgent needs. For medical emergencies, dial 911. Now available from your iPhone and Android! Please provide this summary of care documentation to your next provider. Your primary care clinician is listed as Alex Carson. If you have any questions after today's visit, please call [phone].

## 2017-12-05 NOTE — PROGRESS NOTES
CHIEF COMPLAINT: f/u uncontrolled type 2 diabetes    HISTORY OF PRESENT ILLNESS:   Scout Hendricks is a 31 y.o. female with a PMHx as noted below who presents for f/u of uncontrolled type 2 diabetes in pregnancy. Patient is doing well. Her daughter had a respiratory illness requiring hospitalization recently. A1c today is 9.6%  Not taking her meds regularly. Current medications:  Metformin 1000mg BID: Not taken in 2 months  Lantus 65 units: Not taking regularly, maybe 2x/week  Victoza 1.8mg daily  Simvastatin 20mg daily   Lisinopril 5mg daily     Review of home glucose:   AM:  250-300  Lunch: 250-300  Dinner: 250-300    Review of most recent diabetes-related labs:  Lab Results   Component Value Date    HBA1C 7.9 (H) 2017    HBA1C 9.9 (H) 2016    HBA1C 9.7 (H) 10/14/2015    WIU0VWRX 7.1 08/15/2017    ROU0KVVV 7.5 2017    TOI8IMAA 7.4 2017    CHOL 246 (H) 2016    LDLC 158 (H) 2016    GFRAA >60 2017    GFRNA >60 2017    MCACR 14.3 2016    VITD3 13.9 (L) 2016               PAST MEDICAL/SURGICAL HISTORY:   Past Medical History:   Diagnosis Date    Diabetes mellitus ([facility] [address] 2011    insulin    Hyperlipidemia     Obesity      Past Surgical History:   Procedure Laterality Date     DELIVERY ONLY          HX  SECTION      breech       ALLERGIES:   No Known Allergies    MEDICATIONS ON ADMISSION:     Current Outpatient Prescriptions:     Liraglutide (VICTOZA 3-DEBRA) 0.6 mg/0.1 mL (18 mg/3 mL) pnij, 1.8 mg by SubCUTAneous route daily. Titrate up to 1.8mg daily as direcected, Disp: 9 Pen, Rfl: 3    metFORMIN ER (GLUCOPHAGE XR) 500 mg tablet, Take 2 Tabs by mouth Before breakfast and dinner., Disp: 360 Tab, Rfl: 3    simvastatin (ZOCOR) 20 mg tablet, Take 1 Tab by mouth nightly. (Patient taking differently: Take 20 mg by mouth.  Take one tab in the AM and one in the PM), Disp: 90 Tab, Rfl: 3    lisinopril (PRINIVIL, ZESTRIL) 5 mg tablet, Take 1 Tab by mouth daily. , Disp: 90 Tab, Rfl: 3    insulin glargine (LANTUS,BASAGLAR) 100 unit/mL (3 mL) inpn, Up to 120 units subcutaneously at each bedtime, Disp: 20 Pen, Rfl: 3    Blood-Glucose Meter (ONETOUCH VERIO FLEX) misc, Use to test blood sugar 8 times daily, Disp: 1 Each, Rfl: 0    Insulin Needles, Disposable, (YOUNG PEN NEEDLE) 32 gauge x 5/32\" ndle, Use with insulin pens to inject up to 7 times daily, Disp: 200 Pen Needle, Rfl: 12    glucose blood VI test strips (ONE TOUCH VERIO) strip, Use to test blood glucose 5x daily, Disp: 100 Strip, Rfl: 11    acetaminophen (TYLENOL) 325 mg tablet, Take 650 mg by mouth every four (4) hours as needed for Pain., Disp: , Rfl:     PNV with Ca,No.71-Iron-FA 27-1 mg tab, Take  by mouth., Disp: , Rfl:     SOCIAL HISTORY:   Social History     Social History    Marital status:      Spouse name: N/A    Number of children: N/A    Years of education: N/A     Occupational History    Not on file. Social History Main Topics    Smoking status: Never Smoker    Smokeless tobacco: Never Used    Alcohol use No    Drug use: No    Sexual activity: Yes     Partners: Male     Birth control/ protection: None     Other Topics Concern    Not on file     Social History Narrative       FAMILY HISTORY:  Family History   Problem Relation Age of Onset    Diabetes Mother     Diabetes Father     Diabetes Sister     Diabetes Brother        REVIEW OF SYSTEMS: Complete ROS assessed and noted for that which is described above, all else are negative.   Eyes: normal  ENT: normal  CVS: normal  Resp: normal  GI: normal  : normal  GYN: normal  Endocrine: normal  Integument: normal  Musculoskeletal: normal  Neuro: parasthesias of feet  Psych: normal      PHYSICAL EXAMINATION:    VITAL SIGNS:  Visit Vitals    /75 (BP 1 Location: Left arm, BP Patient Position: Sitting)    Pulse 88    Ht 5' 1\" (1.549 m)    Wt 184 lb 3.2 oz (83.6 kg)    LMP 01/10/2017 (Exact Date)   [address] [place] [address] BMI 34.8 kg/m2       GENERAL: NCAT, Sitting comfortably, NAD  EYES: EOMI, non-icteric, no proptosis  Ear/Nose/Throat: NCAT, no inflammation, no masses  LYMPH NODES: No LAD  CARDIOVASCULAR: S1 S2, RRR, No murmur  RESPIRATORY: CTA b/l, no wheeze/rales  GASTROINTESTINAL:  ND  MUSCULOSKELETAL: Normal ROM, no atrophy  SKIN: warm, no edema/rash/ or other skin changes  NEUROLOGIC: 5/5 power all extremities, no tremor, AAOx3  PSYCHIATRIC: Normal affect, Normal insight and judgement           Diabetic foot exam performed by Ronda Madden MD     Measurement  Response Nurse Comment Physician Comment   Monofilament  R - normal sensation with micro filament  L - normal sensation with micro filament     Pulse DP R - 2+ (normal)  L - 2+ (normal)     Vibration R - Normal    L - Normal     Structural deformity R - None  L - None     Skin Integrity / Deformity R - None  L - None        Reviewed by:            REVIEW OF LABORATORY AND RADIOLOGY DATA:   Labs and documentation have been reviewed as described above. ASSESSMENT AND PLAN:   Karla Westbrook is a 31 y.o. female with a PMHx as noted above who presents for f/u of uncontrolled type 2 diabetes. Type 2 diabetes Uncontrolled  Hyperlipidemia  Hypertension  Vitamin D deficiency    Patients diabetes is not controlled and this is due to taking the lantus only now and then, and also due to not taking the metformin. She should restart metformin and take her lantus each day before considering any next step therapies. She is aware and reports she will make a good effort to take her medications more regularly. Requesting refills today. Foot exam completed. Advised her of her eye exam needing to be renewed.     Plan: Type 2 Diabetes  Medications:  Lantus: 65 units daily : Need to take regularly  Victoza 1.8mg daily : Continue  Metformin, 1000mg BID : Restart  Refilled meds    HTN:  lisinopril 5mg daily  HLD:  simvastatin 20mg daily  Vitamin D Deficiency: Continue 2000 units of D3 daily    RTC in 6 weeks,    Emanuel CALDERON.  [address] [facility]

## 2017-12-06 LAB
ALBUMIN SERPL-MCNC: 4.2 G/DL (ref 3.5–5.5)
ALBUMIN/CREAT UR: 138.9 MG/G CREAT (ref 0–30)
ALBUMIN/GLOB SERPL: 1.4 {RATIO} (ref 1.2–2.2)
ALP SERPL-CCNC: 84 IU/L (ref 39–117)
ALT SERPL-CCNC: 9 IU/L (ref 0–32)
AST SERPL-CCNC: 12 IU/L (ref 0–40)
BILIRUB SERPL-MCNC: 0.3 MG/DL (ref 0–1.2)
BUN SERPL-MCNC: 13 MG/DL (ref 6–20)
BUN/CREAT SERPL: 27 (ref 9–23)
CALCIUM SERPL-MCNC: 9.5 MG/DL (ref 8.7–10.2)
CHLORIDE SERPL-SCNC: 98 MMOL/L (ref 96–106)
CHOLEST SERPL-MCNC: 216 MG/DL (ref 100–199)
CO2 SERPL-SCNC: 23 MMOL/L (ref 18–29)
CREAT SERPL-MCNC: 0.49 MG/DL (ref 0.57–1)
CREAT UR-MCNC: 78.4 MG/DL
GFR SERPLBLD CREATININE-BSD FMLA CKD-EPI: 128 ML/MIN/1.73
GFR SERPLBLD CREATININE-BSD FMLA CKD-EPI: 147 ML/MIN/1.73
GLOBULIN SER CALC-MCNC: 3.1 G/DL (ref 1.5–4.5)
GLUCOSE SERPL-MCNC: 209 MG/DL (ref 65–99)
HDLC SERPL-MCNC: 38 MG/DL
INTERPRETATION, 910389: NORMAL
LDLC SERPL CALC-MCNC: ABNORMAL MG/DL (ref 0–99)
Lab: NORMAL
MICROALBUMIN UR-MCNC: 108.9 UG/ML
POTASSIUM SERPL-SCNC: 4.5 MMOL/L (ref 3.5–5.2)
PROT SERPL-MCNC: 7.3 G/DL (ref 6–8.5)
SODIUM SERPL-SCNC: 138 MMOL/L (ref 134–144)
TRIGL SERPL-MCNC: 402 MG/DL (ref 0–149)
VLDLC SERPL CALC-MCNC: ABNORMAL MG/DL (ref 5–40)

## 2017-12-30 DIAGNOSIS — O24.912 DIABETES MELLITUS COMPLICATING PREGNANCY, SECOND TRIMESTER: ICD-10-CM

## 2017-12-30 RX ORDER — INSULIN GLARGINE 100 [IU]/ML
INJECTION, SOLUTION SUBCUTANEOUS
Qty: 45 ML | Refills: 0 | Status: SHIPPED | OUTPATIENT
Start: 2017-12-30 | End: 2018-04-18 | Stop reason: SDUPTHER

## 2018-03-06 ENCOUNTER — DOCUMENTATION ONLY (OUTPATIENT)
Dept: ENDOCRINOLOGY | Age: 38
End: 2018-03-06

## 2018-03-06 NOTE — PROGRESS NOTES
MsPineda Cheryl Stone did not come in to  the Victoza Samples set aside for her on 12/14/2017. These samples have been returned to stock.   Susan Garcia

## 2018-04-18 ENCOUNTER — OFFICE VISIT (OUTPATIENT)
Dept: ENDOCRINOLOGY | Age: 38
End: 2018-04-18

## 2018-04-18 VITALS
BODY MASS INDEX: 33.32 KG/M2 | SYSTOLIC BLOOD PRESSURE: 122 MMHG | HEART RATE: 105 BPM | DIASTOLIC BLOOD PRESSURE: 86 MMHG | HEIGHT: 61 IN | WEIGHT: 176.5 LBS

## 2018-04-18 DIAGNOSIS — Z79.4 TYPE 2 DIABETES MELLITUS WITHOUT COMPLICATION, WITH LONG-TERM CURRENT USE OF INSULIN (HCC): Primary | ICD-10-CM

## 2018-04-18 DIAGNOSIS — O24.912 DIABETES MELLITUS COMPLICATING PREGNANCY, SECOND TRIMESTER: ICD-10-CM

## 2018-04-18 DIAGNOSIS — E78.5 HYPERLIPIDEMIA, UNSPECIFIED HYPERLIPIDEMIA TYPE: ICD-10-CM

## 2018-04-18 DIAGNOSIS — E11.9 TYPE 2 DIABETES MELLITUS WITHOUT COMPLICATION, WITH LONG-TERM CURRENT USE OF INSULIN (HCC): Primary | ICD-10-CM

## 2018-04-18 DIAGNOSIS — I10 ESSENTIAL HYPERTENSION WITH GOAL BLOOD PRESSURE LESS THAN 130/80: ICD-10-CM

## 2018-04-18 RX ORDER — METFORMIN HYDROCHLORIDE 500 MG/1
1000 TABLET, EXTENDED RELEASE ORAL
Qty: 360 TAB | Refills: 3 | Status: SHIPPED | OUTPATIENT
Start: 2018-04-18

## 2018-04-18 RX ORDER — INSULIN GLARGINE 100 [IU]/ML
INJECTION, SOLUTION SUBCUTANEOUS
Qty: 25 PEN | Refills: 3 | Status: SHIPPED | OUTPATIENT
Start: 2018-04-18

## 2018-04-18 NOTE — MR AVS SNAPSHOT
[address] [facility] [address] [id number] [phone] Patient: Dominique Brown MRN: [id number] Visit Information Date & Time Provider Department Dept. Phone Encounter #  
 2018  3:30 PM Igor Parish, [address] [place] [address] Diabetes and Endocrinology [phone] [id number] Upcoming Health Maintenance Date Due  
 EYE EXAM RETINAL OR DILATED Q1 1992 Pneumococcal 19-64 Medium Risk (1 of 1 - PPSV23) 2001 Influenza Age 5 to Adult 2017 HEMOGLOBIN A1C Q6M 2018 FOOT EXAM Q1 2018 MICROALBUMIN Q1 2018 LIPID PANEL Q1 2018 PAP AKA CERVICAL CYTOLOGY 2020 DTaP/Tdap/Td series (2 - Td) 2027 Allergies as of 2018  Review Complete On: 2018 By: Igor Parish MD  
 No Known Allergies Current Immunizations  Reviewed on 2014 Name Date Influenza High Dose Vaccine PF 10/1/2013 Tdap 2017  1:29 PM  
  
 Not reviewed this visit You Were Diagnosed With   
  
 Codes Comments Type 2 diabetes mellitus without complication, with long-term current use of insulin (HCC)    -  Primary ICD-10-CM: E11.9, Z79.4 ICD-9-CM: 250.00, V58.67 Hyperlipidemia, unspecified hyperlipidemia type     ICD-10-CM: E78.5 ICD-9-CM: 272.4 Essential hypertension with goal blood pressure less than 130/80     ICD-10-CM: I10 
ICD-9-CM: 401.9 Vitals BP Pulse Height(growth percentile) Weight(growth percentile) BMI OB Status 122/86 (BP 1 Location: Right arm, BP Patient Position: Sitting) (!) 105 5' 1\" (1.549 m) 176 lb 8 oz (80.1 kg) 33.35 kg/m2 Recent pregnancy Smoking Status Never Smoker BMI and BSA Data Body Mass Index Body Surface Area  
 33.35 kg/m 2 1.86 m 2 Preferred Pharmacy Pharmacy Name Phone [address] [address] [phone] Your Updated Medication List  
  
   
This list is accurate as of 4/18/18  4:20 PM.  Always use your most recent med list.  
  
  
  
  
 Blood-Glucose Meter Misc Commonly known as:  ONETOUCH VERIO FLEX Use to test blood sugar 8 times daily  
  
 glucose blood VI test strips strip Commonly known as:  Donnel Nails Use to test blood glucose 5x daily Insulin Needles (Disposable) 32 gauge x 5/32\" Ndle Commonly known as:  Anita Pen Needle Use with insulin pens to inject up to 4 times daily LANTUS SOLOSTAR U-100 INSULIN 100 unit/mL (3 mL) Inpn Generic drug:  insulin glargine INJECT UP  UNITS UNDER THE SKIN AT BEDTIME Liraglutide 0.6 mg/0.1 mL (18 mg/3 mL) Pnij Commonly known as:  VICTOZA 3-DEBRA  
1.8 mg by SubCUTAneous route daily. Titrate up to 1.8mg daily as direcected  
  
 lisinopril 5 mg tablet Commonly known as:  Leon Manchester Take 1 Tab by mouth daily. metFORMIN  mg tablet Commonly known as:  GLUCOPHAGE XR Take 2 Tabs by mouth Before breakfast and dinner. PNV with Ca,No.71-Iron-FA 27-1 mg Tab Take  by mouth. simvastatin 20 mg tablet Commonly known as:  ZOCOR Take 1 Tab by mouth nightly. TYLENOL 325 mg tablet Generic drug:  acetaminophen Take 650 mg by mouth every four (4) hours as needed for Pain. VITAMIN D3 PO Take  by mouth. Patient Instructions Start invokana 300mg daily Lantus: 65 units daily : Need to take regularly Victoza 1.8mg daily : Continue Metformin, increase to 1000mg twice daily Please note our new policy, you must arrive to the clinic 15 minutes before your appointment time to allow enough time for proper check-in, adequate time to spend with your doctor, and also to respect the appointment time of the next patient. Not arriving 15 minutes in advance may result in having your appointment rescheduled for the next available day/time. ---------------------------------------------------------------------------------------------------------------------- Below you will find a glucose log sheet which you can use to record your blood sugars. Without checking and recording what your home glucose levels are, it will be difficult to make any changes to your medication dose, even when significant changes may be needed. Please feel free to use the log below to record your home glucose levels. At the very least, I would like for you to login the entire 2-3 weeks just before your visit so we can make your visit much more productive and beneficial to you. GLUCOSE LOG SHEET: 
 
Date Breakfast Lunch Dinner Bedtime Comments ? GLUCOSE LOG SHEET: 
 
Date Breakfast Lunch Dinner Bedtime Comments ? GLUCOSE LOG SHEET: 
 
Date Breakfast Lunch Dinner Bedtime Comments ? Introducing [facility]! Loly Pelayo introduces Ifbyphone patient portal. Now you can access parts of your medical record, email your doctor's office, and request medication refills online. 1. In your internet browser, go to [email or web address] 2. Click on the First Time User? Click Here link in the Sign In box. You will see the New Member Sign Up page. 3. Enter your DashThis Access Code exactly as it appears below. You will not need to use this code after youve completed the sign-up process. If you do not sign up before the expiration date, you must request a new code. · DashThis Access Code: [email or web address] Expires: 7/17/2018  4:20 PM 
 
4. Enter the last four digits of your Social Security Number (xxxx) and Date of Birth (mm/dd/yyyy) as indicated and click Submit. You will be taken to the next sign-up page. 5. Create a DashThis ID. This will be your DashThis login ID and cannot be changed, so think of one that is secure and easy to remember. 6. Create a DashThis password. You can change your password at any time. 7. Enter your Password Reset Question and Answer. This can be used at a later time if you forget your password. 8. Enter your e-mail address. You will receive e-mail notification when new information is available in [address]. 9. Click Sign Up. You can now view and download portions of your medical record. 10. Click the Download Summary menu link to download a portable copy of your medical information. If you have questions, please visit the Frequently Asked Questions section of the DashThis website. Remember, DashThis is NOT to be used for urgent needs. For medical emergencies, dial 911. Now available from your iPhone and Android! Please provide this summary of care documentation to your next provider. Your primary care clinician is listed as Luis Manuel Mckay. If you have any questions after today's visit, please call [phone].

## 2018-04-18 NOTE — PATIENT INSTRUCTIONS
Start invokana 300mg daily  Lantus: 65 units daily : Need to take regularly  Victoza 1.8mg daily : Continue  Metformin, increase to 1000mg twice daily       Please note our new policy, you must arrive to the clinic 15 minutes before your appointment time to allow enough time for proper check-in, adequate time to spend with your doctor, and also to respect the appointment time of the next patient. Not arriving 15 minutes in advance may result in having your appointment rescheduled for the next available day/time.  ----------------------------------------------------------------------------------------------------------------------    Below you will find a glucose log sheet which you can use to record your blood sugars. Without checking and recording what your home glucose levels are, it will be difficult to make any changes to your medication dose, even when significant changes may be needed. Please feel free to use the log below to record your home glucose levels. At the very least, I would like for you to login the entire 2-3 weeks just before your visit so we can make your visit much more productive and beneficial to you. GLUCOSE LOG SHEET:    Date Breakfast Lunch Dinner Bedtime Comments ? GLUCOSE LOG SHEET:    Date Breakfast Lunch Dinner Bedtime Comments ? GLUCOSE LOG SHEET:    Date Breakfast Lunch Dinner Bedtime Comments ?

## 2018-04-18 NOTE — PROGRESS NOTES
CHIEF COMPLAINT: f/u uncontrolled type 2 diabetes    HISTORY OF PRESENT ILLNESS:   Marshal Milian is a 30 y.o. female with a PMHx as noted below who presents for f/u of uncontrolled type 2 diabetes in pregnancy. On the prior visit her A1c was 9.6%, she was not taking her lantus regularly and had not taken her metformin at all. We advised regular dosing of lantus and we restarted her metformin. Today however her A1c is 11.1%. Current medications:  Metformin 1000mg BID, only taking once daily and missing \"alot\"  Lantus 70 units, reports missing twice per week,   Victoza 1.8mg daily, taking regularly but not always in the AM  Simvastatin 20mg daily   Lisinopril 5mg daily (not taking, BP stable)    Review of home glucose:   AM:  200-300's  Lunch: 200-300's  Dinner: 200's    Review of most recent diabetes-related labs:  Lab Results   Component Value Date    HBA1C 7.9 (H) 2017    HBA1C 9.9 (H) 2016    HBA1C 9.7 (H) 10/14/2015    IAO0ZOHT 9.6 2017    JRV7DFMD 7.1 08/15/2017    HCI4CKBV 7.5 2017    CHOL 216 (H) 2017    LDLC Comment 2017    GFRAA 147 2017    GFRNA 128 2017    MCACR 138.9 (H) 2017    VITD3 13.9 (L) 2016               PAST MEDICAL/SURGICAL HISTORY:   Past Medical History:   Diagnosis Date    Diabetes mellitus ([facility] [address]     insulin    Hyperlipidemia     Obesity      Past Surgical History:   Procedure Laterality Date     DELIVERY ONLY          HX  SECTION  2011    breech       ALLERGIES:   No Known Allergies    MEDICATIONS ON ADMISSION:     Current Outpatient Prescriptions:     CHOLECALCIFEROL, VITAMIN D3, (VITAMIN D3 PO), Take  by mouth., Disp: , Rfl:     LANTUS SOLOSTAR 100 unit/mL (3 mL) inpn, INJECT UP  UNITS UNDER THE SKIN AT BEDTIME (Patient taking differently: inject 70 units at bedtime), Disp: 45 mL, Rfl: 0    Liraglutide (VICTOZA 3-DEBRA) 0.6 mg/0.1 mL (18 mg/3 mL) pnij, 1.8 mg by SubCUTAneous route daily. Titrate up to 1.8mg daily as direcected, Disp: 2 Pen, Rfl: 0    metFORMIN ER (GLUCOPHAGE XR) 500 mg tablet, Take 2 Tabs by mouth Before breakfast and dinner., Disp: 360 Tab, Rfl: 3    glucose blood VI test strips (ONETOUCH VERIO) strip, Use to test blood glucose 5x daily, Disp: 100 Strip, Rfl: 11    Insulin Needles, Disposable, (YOUNG PEN NEEDLE) 32 gauge x 5/32\" ndle, Use with insulin pens to inject up to 4 times daily, Disp: 200 Pen Needle, Rfl: 7    simvastatin (ZOCOR) 20 mg tablet, Take 1 Tab by mouth nightly. (Patient taking differently: Take 20 mg by mouth. Take one tab in the AM and one in the PM), Disp: 90 Tab, Rfl: 3    Blood-Glucose Meter (ONETOUCH VERIO FLEX) misc, Use to test blood sugar 8 times daily, Disp: 1 Each, Rfl: 0    acetaminophen (TYLENOL) 325 mg tablet, Take 650 mg by mouth every four (4) hours as needed for Pain., Disp: , Rfl:     lisinopril (PRINIVIL, ZESTRIL) 5 mg tablet, Take 1 Tab by mouth daily. , Disp: 90 Tab, Rfl: 3    PNV with Ca,No.71-Iron-FA 27-1 mg tab, Take  by mouth., Disp: , Rfl:     SOCIAL HISTORY:   Social History     Social History    Marital status:      Spouse name: N/A    Number of children: N/A    Years of education: N/A     Occupational History    Not on file. Social History Main Topics    Smoking status: Never Smoker    Smokeless tobacco: Never Used    Alcohol use No    Drug use: No    Sexual activity: Yes     Partners: Male     Birth control/ protection: None     Other Topics Concern    Not on file     Social History Narrative       FAMILY HISTORY:  Family History   Problem Relation Age of Onset    Diabetes Mother     Diabetes Father     Diabetes Sister     Diabetes Brother        REVIEW OF SYSTEMS: Complete ROS assessed and noted for that which is described above, all else are negative.   Eyes: normal  ENT: normal  CVS: normal  Resp: normal  GI: normal  : normal  GYN: normal  Endocrine: normal  Integument: normal  Musculoskeletal: normal  Neuro: parasthesias of feet  Psych: normal      PHYSICAL EXAMINATION:    VITAL SIGNS:  Visit Vitals    /86 (BP 1 Location: Right arm, BP Patient Position: Sitting)    Pulse (!) 105    Ht 5' 1\" (1.549 m)    Wt 176 lb 8 oz (80.1 kg)    BMI 33.35 kg/m2       GENERAL: NCAT, Sitting comfortably, NAD  EYES: EOMI, non-icteric, no proptosis  Ear/Nose/Throat: NCAT, no inflammation, no masses  LYMPH NODES: No LAD  CARDIOVASCULAR: S1 S2, RRR, No murmur  RESPIRATORY: CTA b/l, no wheeze/rales  GASTROINTESTINAL:  ND  MUSCULOSKELETAL: Normal ROM, no atrophy  SKIN: warm, no edema/rash/ or other skin changes  NEUROLOGIC: 5/5 power all extremities, no tremor, AAOx3  PSYCHIATRIC: Normal affect, Normal insight and judgement      REVIEW OF LABORATORY AND RADIOLOGY DATA:   Labs and documentation have been reviewed as described above. ASSESSMENT AND PLAN:   Joey Messina is a 30 y.o. female with a PMHx as noted above who presents for f/u of uncontrolled type 2 diabetes. Type 2 diabetes Uncontrolled  Hyperlipidemia  Hypertension  Vitamin D deficiency    Patient continues to miss doses of her medications which is the main concern. Her A1c is now 11.1% and she is having painful neuropathy in her hands and feet. We had a lengthy discussion about her diabetes control. She would like to restart invokana which she was on in the past and reports had helped her. We discussed that it would be ok however would not be of benefit if not taking regularly. Reminded her of the potential for yeast infections, importance of hydration, etc. Of importance also was taking metformin which is a foundation medication to help with her insulin resistance. Discussed the importance of her having some personal time at home to take care of herself, as she is a mother of 1, one is a new infant, another is autistic, and so she is very busy. She does not have any help at home which she does need.      Plan: Type 2 Diabetes  Medications:  Start invokana 300mg daily  Lantus: 65 units daily : Need to take regularly  Victoza 1.8mg daily : Continue  Metformin, increase to 1000mg twice daily   Refilled all meds, paper scripts provided as requested    HTN:  Ok to hold lisinopril 5mg daily (had not been taking)  HLD:  Smvastatin 20mg daily  Vitamin D Deficiency: Continue 2000 units of D3 daily    RTC in 2 months,    Kam UPTON  [address] [facility]

## 2018-04-19 LAB — HBA1C MFR BLD HPLC: 11.1 %

## 2018-05-16 ENCOUNTER — PATIENT OUTREACH (OUTPATIENT)
Dept: ENDOCRINOLOGY | Age: 38
End: 2018-05-16

## 2018-05-16 NOTE — Clinical Note
MARIA LUZ,  Patient is working on getting her blood sugar readings to me for your review. She states she is taking her medications correctly, but she is having problems with diarrhea and abdominal pain with Metformin. Please advise. Thank you.

## 2018-05-16 NOTE — PROGRESS NOTES
MD Hafsa Rosas, CONSTANZA                     Hi Dolly Ovalle,     This is a message I have dated for the future to be released. I saw this patient for f/u today on 18. Her a1c is 11.1% and she is having signs of progressive neuropathy in her hands and feet. She is a mother of 3, one is autistic, another is a new infant, and she does not have any support at home (yes she is ). She misses a lot of her medications and is under a lot of pressure. Would you able to give her a call to check on her around this time, it would be of great importance to make sure she is doing ok. Thank you ! brianna       18  Spoke to patient today, verified patient's name, address and . Patient states doing okay, no problems or concerns noted. Patient states her blood sugars have been averaging between 160-190 since her last appointment. Patient states she is taking her blood sugar 1-2 times daily, usually before lunch or dinner. Patient states no episodes of low blood sugar noted. Patient states she has not recorded her blood sugar readings, they are in her glucometer, but she is not able to get it right now. NN will call patient at another time for daily blood sugars. Patient states she is taking: Metformin 500 mg, 2 tablets 2 times daily; Lantus 70 units daily; Invokana 300 mg daily and Victoza 1.8 mg daily    Patient states she is having severe abdominal pain and diarrhea with the Metformin. MD notified. Goals Addressed      Patient verbalizes understanding of self -management goals of living with Diabetes. 18  Patient will test blood sugar 1-2 times daily, record and send to office for MD review by Friday, 18. 1314 Kylee Thibodeaux, RN                   This is a good indicator that she was in fact missing a lot of doses as she mentioned.  She should go back to 1 tablet twice daily for 2 weeks, then challenge with 1 tab in the AM, 2 tabs in the PM for 2-3 weeks, then try the 2 tabs twice daily after that. She can try taking with her meals to see if that helps also in addition to the above. Thanks Heidy Mcnamara,         NN spoke to patient, verified patient's name, address and . Patient informed of the above MD instruction. NN will call patient on Friday for most recent blood sugar readings.

## 2018-12-30 ENCOUNTER — HOSPITAL ENCOUNTER (EMERGENCY)
Age: 38
Discharge: HOME | End: 2018-12-30
Attending: EMERGENCY MEDICINE
Payer: SELF-PAY

## 2018-12-30 ENCOUNTER — APPOINTMENT (OUTPATIENT)
Dept: GENERAL RADIOLOGY | Age: 38
End: 2018-12-30
Attending: PHYSICIAN ASSISTANT
Payer: SELF-PAY

## 2018-12-30 VITALS
OXYGEN SATURATION: 100 % | TEMPERATURE: 98 F | RESPIRATION RATE: 18 BRPM | HEART RATE: 85 BPM | DIASTOLIC BLOOD PRESSURE: 84 MMHG | SYSTOLIC BLOOD PRESSURE: 125 MMHG
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
## 2018-12-30 DIAGNOSIS — M79.604 RIGHT LEG PAIN: ICD-10-CM

## 2018-12-30 PROCEDURE — 74011250637 HC RX REV CODE- 250/637: Performed by: PHYSICIAN ASSISTANT

## 2018-12-30 PROCEDURE — 99283 EMERGENCY DEPT VISIT LOW MDM: CPT

## 2018-12-30 PROCEDURE — 73610 X-RAY EXAM OF ANKLE: CPT

## 2018-12-30 PROCEDURE — 73590 X-RAY EXAM OF LOWER LEG: CPT

## 2018-12-30 RX ORDER — OXYCODONE AND ACETAMINOPHEN 5; 325 MG/1; MG/1
1 TABLET ORAL
Status: COMPLETED | OUTPATIENT
Start: 2018-12-30 | End: 2018-12-30

## 2018-12-30 RX ORDER — NAPROXEN 500 MG/1
500 TABLET ORAL 2 TIMES DAILY WITH MEALS
Qty: 20 TAB | Refills: 0 | Status: SHIPPED | OUTPATIENT
Start: 2018-12-30 | End: 2019-01-09

## 2018-12-30 RX ADMIN — OXYCODONE AND ACETAMINOPHEN 1 TABLET: 5; 325 TABLET ORAL at 21:07

## 2018-12-31 NOTE — DISCHARGE INSTRUCTIONS
Apply ice  Elevate the leg  Naprosyn twice daily for pain  Follow-up with regular doctor  Return for any new or worsening. Leticia Vasquez, [place]       Leg Pain: Care Instructions  Your Care Instructions  Many things can cause leg pain. Too much exercise or overuse can cause a muscle cramp (or charley horse). You can get leg cramps from not eating a balanced diet that has enough potassium, calcium, and other minerals. If you do not drink enough fluids or are taking certain medicines, you may develop leg cramps. Other causes of leg pain include injuries, blood flow problems, nerve damage, and twisted and enlarged veins (varicose veins). You can usually ease pain with self-care. Your doctor may recommend that you rest your leg and keep it elevated. Follow-up care is a key part of your treatment and safety. Be sure to make and go to all appointments, and call your doctor if you are having problems. It's also a good idea to know your test results and keep a list of the medicines you take. How can you care for yourself at home? · Take pain medicines exactly as directed. ? If the doctor gave you a prescription medicine for pain, take it as prescribed. ? If you are not taking a prescription pain medicine, ask your doctor if you can take an over-the-counter medicine. · Take any other medicines exactly as prescribed. Call your doctor if you think you are having a problem with your medicine. · Rest your leg while you have pain, and avoid standing for long periods of time. · Prop up your leg at or above the level of your heart when possible. · Make sure you are eating a balanced diet that is rich in calcium, potassium, and magnesium, especially if you are pregnant. · If directed by your doctor, put ice or a cold pack on the area for 10 to 20 minutes at a time. Put a thin cloth between the ice and your skin. · Your leg may be in a splint, a brace, or an elastic bandage, and you may have crutches to help you walk. Follow your doctor's directions about how long to wear supports and how to use the crutches. When should you call for help? Call 911 anytime you think you may need emergency care. For example, call if:    · You have sudden chest pain and shortness of breath, or you cough up blood.     · Your leg is cool or pale or changes color.    Call your doctor now or seek immediate medical care if:    · You have increasing or severe pain.     · Your leg suddenly feels weak and you cannot move it.     · You have signs of a blood clot, such as:  ? Pain in your calf, back of the knee, thigh, or groin. ? Redness and swelling in your leg or groin.     · You have signs of infection, such as:  ? Increased pain, swelling, warmth, or redness. ? Red streaks leading from the sore area. ? Pus draining from a place on your leg. ? A fever.     · You cannot bear weight on your leg.    Watch closely for changes in your health, and be sure to contact your doctor if:    · You do not get better as expected. Where can you learn more? Go to http://tayler-eduardo.info/. Enter Q377 in the search box to learn more about \"Leg Pain: Care Instructions. \"  Current as of: November 20, 2017  Content Version: 11.8  © 4174-0840 [employer] Care instructions adapted under license by [employer] (which disclaims liability or warranty for this information). If you have questions about a medical condition or this instruction, always ask your healthcare professional. [place] [address] any warranty or liability for your use of this information.

## 2018-12-31 NOTE — ED PROVIDER NOTES
40 yo female with complaint of 10/10, rt anterior leg pain following a fall at 300 Pm after slipping on a puddle in the floor. Denies head injury or LOC. Ambulatory immediately following. No medications for pain PTA. Pain radiates down to the foot. Worse with weight bearing and palpation. No distal numbness or weakness. No fever, headache, sore throat, cough, rhinorrhea, sneezing, SOB, abdominal pain, nausea, vomiting, or urinary complaints. Past Medical History:  
Diagnosis Date  Diabetes mellitus ([facility] [address] 2011  
 insulin  Hyperlipidemia  Obesity Past Surgical History:  
Procedure Laterality Date [facility] 812 ONLY    
 2011  HX  SECTION  2011  
 breech Family History:  
Problem Relation Age of Onset  Diabetes Mother  Diabetes Father  Diabetes Sister  Diabetes Brother Social History Socioeconomic History  Marital status:  Spouse name: Not on file  Number of children: Not on file  Years of education: Not on file  Highest education level: Not on file Social Needs  Financial resource strain: Not on file  Food insecurity - worry: Not on file  Food insecurity - inability: Not on file  Transportation needs - medical: Not on file  Transportation needs - non-medical: Not on file Occupational History  Not on file Tobacco Use  Smoking status: Never Smoker  Smokeless tobacco: Never Used Substance and Sexual Activity  Alcohol use: No  
 Drug use: No  
 Sexual activity: Yes  
  Partners: Male Birth control/protection: None Other Topics Concern  Not on file Social History Narrative  Not on file ALLERGIES: Patient has no known allergies. Review of Systems Constitutional: Negative. Negative for chills, fatigue and fever. HENT: Negative. Negative for congestion, ear pain, facial swelling, rhinorrhea, sneezing and sore throat. Eyes: Negative for pain, discharge and itching. Respiratory: Negative for cough and shortness of breath. Cardiovascular: Negative. Negative for chest pain and leg swelling. Gastrointestinal: Negative. Negative for abdominal pain, constipation, diarrhea, nausea and vomiting. Genitourinary: Negative for difficulty urinating, frequency and urgency. Musculoskeletal: Positive for arthralgias. Neurological: Negative for numbness and headaches. All other systems reviewed and are negative. Vitals:  
 12/30/18 2048 Pulse: (!) 115 Resp: 18 Physical Exam  
Constitutional: She is oriented to person, place, and time. She appears well-developed and well-nourished. No distress. Adult female in NAD HENT:  
Head: Normocephalic and atraumatic. Left Ear: External ear normal.  
Eyes: Conjunctivae are normal. Pupils are equal, round, and reactive to light. Neck: Normal range of motion. Neck supple. Cardiovascular: Normal rate, regular rhythm and normal heart sounds. Pulmonary/Chest: Effort normal. No respiratory distress. She has no wheezes. Abdominal: Soft. Bowel sounds are normal. She exhibits no distension. There is no tenderness. There is no rebound. Musculoskeletal: Normal range of motion. Legs: 
Neurological: She is alert and oriented to person, place, and time. Skin: Skin is warm and dry. No ecchymosis, no laceration and no lesion noted. Nursing note and vitals reviewed. MDM Number of Diagnoses or Management Options Fall, initial encounter:  
Right leg pain:  
Diagnosis management comments: 42 yo female with complaint of rt leg pain following a mechanical GLF today. No obvious trauma noted on exam. ? Fracture vs soft tissue injury Plan  
 
xr rt tib fib Xray rt ankle Analgesia [facility]assess. [facility] [address] Amount and/or Complexity of Data Reviewed Tests in the radiology section of CPT®: ordered and reviewed Review and summarize past medical records: yes Procedures Progress note Imaging reviewed. April LANRE Gibbons, [address] No acute fracture. Will treat with NSAIDs and crutches for ambulating until pain improved. April LANRE Gibbons, [address] Patient's results have been reviewed with them. Patient and/or family have verbally conveyed their understanding and agreement of the patient's signs, symptoms, diagnosis, treatment and prognosis and additionally agree to follow up as recommended or return to the Emergency Room should their condition change prior to follow-up. Discharge instructions have also been provided to the patient with some educational information regarding their diagnosis as well a list of reasons why they would want to return to the ER prior to their follow-up appointment should their condition change.  Leticia Gibbons, [address]

## 2018-12-31 NOTE — ED TRIAGE NOTES
TRIAGE: Pt arrives reporting R leg pain s/p fall on water on ground. Pain described as 10/10. Pt states she can not walk and is in hospital wheelchair.

## 2019-01-04 ENCOUNTER — HOSPITAL ENCOUNTER (OUTPATIENT)
Dept: LAB | Age: 39
Discharge: HOME | End: 2019-01-04

## 2019-01-04 LAB
ALBUMIN SERPL-MCNC: 3.7 G/DL (ref 3.5–5)
ALBUMIN/GLOB SERPL: 0.9 {RATIO} (ref 1.1–2.2)
ALP SERPL-CCNC: 83 U/L (ref 45–117)
ALT SERPL-CCNC: 15 U/L (ref 12–78)
ANION GAP SERPL CALC-SCNC: 9 MMOL/L (ref 5–15)
AST SERPL-CCNC: 15 U/L (ref 15–37)
BILIRUB SERPL-MCNC: 0.5 MG/DL (ref 0.2–1)
BUN SERPL-MCNC: 14 MG/DL (ref 6–20)
BUN/CREAT SERPL: 26 (ref 12–20)
CALCIUM SERPL-MCNC: 9.2 MG/DL (ref 8.5–10.1)
CHLORIDE SERPL-SCNC: 98 MMOL/L (ref 97–108)
CHOLEST SERPL-MCNC: 241 MG/DL
CO2 SERPL-SCNC: 28 MMOL/L (ref 21–32)
CREAT SERPL-MCNC: 0.54 MG/DL (ref 0.55–1.02)
GLOBULIN SER CALC-MCNC: 3.9 G/DL (ref 2–4)
GLUCOSE SERPL-MCNC: 211 MG/DL (ref 65–100)
HDLC SERPL-MCNC: 35 MG/DL
HDLC SERPL: 6.9 {RATIO} (ref 0–5)
LDLC SERPL CALC-MCNC: ABNORMAL MG/DL (ref 0–100)
LDLC SERPL DIRECT ASSAY-MCNC: 165 MG/DL (ref 0–100)
LIPID PROFILE,FLP: ABNORMAL
POTASSIUM SERPL-SCNC: 4.3 MMOL/L (ref 3.5–5.1)
PROT SERPL-MCNC: 7.6 G/DL (ref 6.4–8.2)
SODIUM SERPL-SCNC: 135 MMOL/L (ref 136–145)
TRIGL SERPL-MCNC: 422 MG/DL (ref ?–150)
VLDLC SERPL CALC-MCNC: ABNORMAL MG/DL

## 2019-01-04 PROCEDURE — 83721 ASSAY OF BLOOD LIPOPROTEIN: CPT

## 2019-01-04 PROCEDURE — 80053 COMPREHEN METABOLIC PANEL: CPT

## 2019-01-04 PROCEDURE — 80061 LIPID PANEL: CPT

## 2020-02-27 ENCOUNTER — HOSPITAL ENCOUNTER (OUTPATIENT)
Dept: LAB | Age: 40
Discharge: HOME | End: 2020-02-27

## 2020-02-27 PROCEDURE — 87186 SC STD MICRODIL/AGAR DIL: CPT

## 2020-02-27 PROCEDURE — 87086 URINE CULTURE/COLONY COUNT: CPT

## 2020-02-27 PROCEDURE — 87077 CULTURE AEROBIC IDENTIFY: CPT

## 2020-03-01 LAB
BACTERIA SPEC CULT: ABNORMAL
CC UR VC: ABNORMAL
SERVICE CMNT-IMP: ABNORMAL

## 2020-10-07 ENCOUNTER — HOSPITAL ENCOUNTER (EMERGENCY)
Age: 40
Discharge: HOME | End: 2020-10-08
Attending: EMERGENCY MEDICINE
Payer: MEDICAID

## 2020-10-07 ENCOUNTER — APPOINTMENT (OUTPATIENT)
Dept: CT IMAGING | Age: 40
End: 2020-10-07
Attending: NURSE PRACTITIONER
Payer: MEDICAID

## 2020-10-07 VITALS
SYSTOLIC BLOOD PRESSURE: 117 MMHG | TEMPERATURE: 97.8 F | RESPIRATION RATE: 18 BRPM | OXYGEN SATURATION: 100 % | DIASTOLIC BLOOD PRESSURE: 75 MMHG | HEART RATE: 90 BPM

## 2020-10-07 DIAGNOSIS — K04.7 DENTAL ABSCESS: Primary | ICD-10-CM

## 2020-10-07 LAB
ANION GAP SERPL CALC-SCNC: 3 MMOL/L (ref 5–15)
BASOPHILS # BLD: 0.1 K/UL (ref 0–0.1)
BASOPHILS NFR BLD: 0 % (ref 0–1)
BUN SERPL-MCNC: 12 MG/DL (ref 6–20)
BUN/CREAT SERPL: 18 (ref 12–20)
CALCIUM SERPL-MCNC: 8.8 MG/DL (ref 8.5–10.1)
CHLORIDE SERPL-SCNC: 101 MMOL/L (ref 97–108)
CO2 SERPL-SCNC: 28 MMOL/L (ref 21–32)
CREAT SERPL-MCNC: 0.66 MG/DL (ref 0.55–1.02)
DIFFERENTIAL METHOD BLD: ABNORMAL
EOSINOPHIL # BLD: 0.1 K/UL (ref 0–0.4)
EOSINOPHIL NFR BLD: 1 % (ref 0–7)
ERYTHROCYTE [DISTWIDTH] IN BLOOD BY AUTOMATED COUNT: 15.1 % (ref 11.5–14.5)
GLUCOSE SERPL-MCNC: 257 MG/DL (ref 65–100)
HCT VFR BLD AUTO: 39 % (ref 35–47)
HGB BLD-MCNC: 12.3 G/DL (ref 11.5–16)
IMM GRANULOCYTES # BLD AUTO: 0.1 K/UL (ref 0–0.04)
IMM GRANULOCYTES NFR BLD AUTO: 0 % (ref 0–0.5)
LYMPHOCYTES # BLD: 3.5 K/UL (ref 0.8–3.5)
LYMPHOCYTES NFR BLD: 28 % (ref 12–49)
MCH RBC QN AUTO: 24.6 PG (ref 26–34)
MCHC RBC AUTO-ENTMCNC: 31.5 G/DL (ref 30–36.5)
MCV RBC AUTO: 78.2 FL (ref 80–99)
MONOCYTES # BLD: 0.7 K/UL (ref 0–1)
MONOCYTES NFR BLD: 6 % (ref 5–13)
NEUTS SEG # BLD: 8 K/UL (ref 1.8–8)
NEUTS SEG NFR BLD: 65 % (ref 32–75)
NRBC # BLD: 0 K/UL (ref 0–0.01)
NRBC BLD-RTO: 0 PER 100 WBC
PLATELET # BLD AUTO: 262 K/UL (ref 150–400)
PMV BLD AUTO: 9.4 FL (ref 8.9–12.9)
POTASSIUM SERPL-SCNC: 3.6 MMOL/L (ref 3.5–5.1)
RBC # BLD AUTO: 4.99 M/UL (ref 3.8–5.2)
SODIUM SERPL-SCNC: 132 MMOL/L (ref 136–145)
WBC # BLD AUTO: 12.4 K/UL (ref 3.6–11)

## 2020-10-07 PROCEDURE — 75810000289 HC I&D ABSCESS SIMP/COMP/MULT

## 2020-10-07 PROCEDURE — 99283 EMERGENCY DEPT VISIT LOW MDM: CPT

## 2020-10-07 PROCEDURE — 70487 CT MAXILLOFACIAL W/DYE: CPT

## 2020-10-07 PROCEDURE — 80048 BASIC METABOLIC PNL TOTAL CA: CPT

## 2020-10-07 PROCEDURE — 74011000258 HC RX REV CODE- 258: Performed by: RADIOLOGY

## 2020-10-07 PROCEDURE — 85025 COMPLETE CBC W/AUTO DIFF WBC: CPT

## 2020-10-07 PROCEDURE — 74011250637 HC RX REV CODE- 250/637: Performed by: NURSE PRACTITIONER

## 2020-10-07 PROCEDURE — 74011000250 HC RX REV CODE- 250: Performed by: NURSE PRACTITIONER

## 2020-10-07 PROCEDURE — 74011000636 HC RX REV CODE- 636: Performed by: RADIOLOGY

## 2020-10-07 PROCEDURE — 36415 COLL VENOUS BLD VENIPUNCTURE: CPT

## 2020-10-07 RX ORDER — SODIUM CHLORIDE 0.9 % (FLUSH) 0.9 %
10 SYRINGE (ML) INJECTION
Status: COMPLETED | OUTPATIENT
Start: 2020-10-07 | End: 2020-10-07

## 2020-10-07 RX ADMIN — LIDOCAINE HYDROCHLORIDE: 20 SOLUTION ORAL; TOPICAL at 22:57

## 2020-10-07 RX ADMIN — IOPAMIDOL 100 ML: 612 INJECTION, SOLUTION INTRAVENOUS at 23:21

## 2020-10-07 RX ADMIN — Medication 10 ML: at 23:20

## 2020-10-07 RX ADMIN — SODIUM CHLORIDE 100 ML: 900 INJECTION, SOLUTION INTRAVENOUS at 23:20

## 2020-10-08 PROCEDURE — 74011250637 HC RX REV CODE- 250/637: Performed by: NURSE PRACTITIONER

## 2020-10-08 RX ORDER — LIDOCAINE HYDROCHLORIDE 20 MG/ML
10 INJECTION, SOLUTION INFILTRATION; PERINEURAL
Status: DISCONTINUED | OUTPATIENT
Start: 2020-10-08 | End: 2020-10-08 | Stop reason: HOSPADM

## 2020-10-08 RX ORDER — CLINDAMYCIN HYDROCHLORIDE 150 MG/1
450 CAPSULE ORAL EVERY 8 HOURS
Qty: 90 CAP | Refills: 0 | Status: SHIPPED | OUTPATIENT
Start: 2020-10-08 | End: 2020-10-18

## 2020-10-08 RX ORDER — OXYCODONE AND ACETAMINOPHEN 5; 325 MG/1; MG/1
1 TABLET ORAL
Qty: 12 TAB | Refills: 0 | Status: SHIPPED | OUTPATIENT
Start: 2020-10-08 | End: 2020-10-11

## 2020-10-08 RX ORDER — LIDOCAINE HYDROCHLORIDE 20 MG/ML
INJECTION, SOLUTION EPIDURAL; INFILTRATION; INTRACAUDAL; PERINEURAL
Status: DISCONTINUED
Start: 2020-10-08 | End: 2020-10-08 | Stop reason: HOSPADM

## 2020-10-08 RX ORDER — OXYCODONE AND ACETAMINOPHEN 5; 325 MG/1; MG/1
1 TABLET ORAL
Status: COMPLETED | OUTPATIENT
Start: 2020-10-08 | End: 2020-10-08

## 2020-10-08 RX ADMIN — OXYCODONE HYDROCHLORIDE AND ACETAMINOPHEN 1 TABLET: 5; 325 TABLET ORAL at 02:01

## 2020-10-08 NOTE — ED PROVIDER NOTES
This is a 51-year-old female with a past medical history of diabetes who presents ER today for chief complaint of left-sided jaw pain/tooth pain s/p root canal yesterday ([facility]by [facility], [address]). Patient states that she underwent a root canal yesterday, and since then has been taking 800 mg of ibuprofen around-the-clock with no relief in her symptoms. She reports 8/10 pain that radiates to her left jaw and left ear. She also reports a sensation of difficulty swallowing and swelling in her face. Reports taking clindamycin as prescribed. Denies fever/chills, nausea/vomiting, and trismus. Denies tinnitus, hearing loss, and vertigo. Past Medical History:   Diagnosis Date    Diabetes [facility])        History reviewed. No pertinent surgical history. History reviewed. No pertinent family history.     Social History     Socioeconomic History    Marital status:      Spouse name: Not on file    Number of children: Not on file    Years of education: Not on file    Highest education level: Not on file   Occupational History    Not on file   Social Needs    Financial resource strain: Not on file    Food insecurity     Worry: Not on file     Inability: Not on file    Transportation needs     Medical: Not on file     Non-medical: Not on file   Tobacco Use    Smoking status: Never Smoker    Smokeless tobacco: Never Used   Substance and Sexual Activity    Alcohol use: Not Currently    Drug use: Never    Sexual activity: Not on file   Lifestyle    Physical activity     Days per week: Not on file     Minutes per session: Not on file    Stress: Not on file   Relationships    Social connections     Talks on phone: Not on file     Gets together: Not on file     Attends Yarsani service: Not on file     Active member of club or organization: Not on file     Attends meetings of clubs or organizations: Not on file     Relationship status: Not on file    Intimate partner violence     Fear of current or ex partner: Not on file     Emotionally abused: Not on file     Physically abused: Not on file     Forced sexual activity: Not on file   Other Topics Concern    Not on file   Social History Narrative    Not on file         ALLERGIES: Patient has no known allergies. Review of Systems   Constitutional: Negative for fever. HENT: Positive for dental problem, ear pain, facial swelling and trouble swallowing. Negative for sore throat. Eyes: Negative for visual disturbance. Respiratory: Negative for shortness of breath. Cardiovascular: Negative for palpitations. Gastrointestinal: Negative for vomiting. Genitourinary: Negative for dysuria. Musculoskeletal: Negative for myalgias. Skin: Negative for rash. Neurological: Negative for dizziness. Psychiatric/Behavioral: Negative for dysphoric mood. Vitals:    10/07/20 2132   BP: 117/75   Pulse: 90   Resp: 18   Temp: 97.8 °F (36.6 °C)   SpO2: 100%            Physical Exam  Vitals signs and nursing note reviewed. Constitutional:       General: She is not in acute distress. Appearance: Normal appearance. She is not ill-appearing. HENT:      Head: Normocephalic and atraumatic. Jaw: There is normal jaw occlusion. Tenderness present. No trismus. Right Ear: External ear normal.      Left Ear: External ear normal.      Nose: Nose normal.      Mouth/Throat:      Mouth: Mucous membranes are moist. Oral lesions present. Pharynx: Oropharynx is clear. No pharyngeal swelling. Tonsils: No tonsillar abscesses. Comments: Swelling and pain to palpation along left-sided bugle mucosa with submandibular lymphadenopathy. No tenderness to palpation beneath chin. No abnormal elevation of tongue or swelling of mucosa beneath tongue. Eyes:      General: No scleral icterus. Extraocular Movements: Extraocular movements intact.       Conjunctiva/sclera: Conjunctivae normal.      Pupils: Pupils are equal, round, and reactive to light. Neck:      Musculoskeletal: Normal range of motion and neck supple. No neck rigidity or muscular tenderness. Cardiovascular:      Rate and Rhythm: Normal rate and regular rhythm. Pulses: Normal pulses. Heart sounds: Normal heart sounds. Pulmonary:      Effort: Pulmonary effort is normal.      Breath sounds: Normal breath sounds. Abdominal:      General: Abdomen is flat. Bowel sounds are normal.      Palpations: Abdomen is soft. Musculoskeletal: Normal range of motion. Skin:     General: Skin is warm and dry. Coloration: Skin is not pale. Neurological:      General: No focal deficit present. Mental Status: She is alert and oriented to person, place, and time. Psychiatric:         Mood and Affect: Mood normal.         Behavior: Behavior normal.         Thought Content: Thought content normal.         Judgment: Judgment normal.          MDM        VITAL SIGNS:  Patient Vitals for the past 4 hrs:   Temp Pulse Resp BP SpO2   10/07/20 2132 97.8 °F (36.6 °C) 90 18 117/75 100 %         LABS:  Recent Results (from the past 6 hour(s))   CBC WITH AUTOMATED DIFF    Collection Time: 10/07/20 10:44 PM   Result Value Ref Range    WBC 12.4 (H) 3.6 - 11.0 K/uL    RBC 4.99 3.80 - 5.20 M/uL    HGB 12.3 11.5 - 16.0 g/dL    HCT 39.0 35.0 - 47.0 %    MCV 78.2 (L) 80.0 - 99.0 FL    MCH 24.6 (L) 26.0 - 34.0 PG    MCHC 31.5 30.0 - 36.5 g/dL    RDW 15.1 (H) 11.5 - 14.5 %    PLATELET 013 943 - 885 K/uL    MPV 9.4 8.9 - 12.9 FL    NRBC 0.0 0  WBC    ABSOLUTE NRBC 0.00 0.00 - 0.01 K/uL    NEUTROPHILS 65 32 - 75 %    LYMPHOCYTES 28 12 - 49 %    MONOCYTES 6 5 - 13 %    EOSINOPHILS 1 0 - 7 %    BASOPHILS 0 0 - 1 %    IMMATURE GRANULOCYTES 0 0.0 - 0.5 %    ABS. NEUTROPHILS 8.0 1.8 - 8.0 K/UL    ABS. LYMPHOCYTES 3.5 0.8 - 3.5 K/UL    ABS. MONOCYTES 0.7 0.0 - 1.0 K/UL    ABS. EOSINOPHILS 0.1 0.0 - 0.4 K/UL    ABS. BASOPHILS 0.1 0.0 - 0.1 K/UL    ABS. IMM.  GRANS. 0.1 (H) 0.00 - 0.04 K/UL    DF AUTOMATED     METABOLIC PANEL, BASIC    Collection Time: 10/07/20 10:44 PM   Result Value Ref Range    Sodium 132 (L) 136 - 145 mmol/L    Potassium 3.6 3.5 - 5.1 mmol/L    Chloride 101 97 - 108 mmol/L    CO2 28 21 - 32 mmol/L    Anion gap 3 (L) 5 - 15 mmol/L    Glucose 257 (H) 65 - 100 mg/dL    BUN 12 6 - 20 MG/DL    Creatinine 0.66 0.55 - 1.02 MG/DL    BUN/Creatinine ratio 18 12 - 20      GFR est AA >60 >60 ml/min/1.73m2    GFR est non-AA >60 >60 ml/min/1.73m2    Calcium 8.8 8.5 - 10.1 MG/DL        IMAGING:  CT MAXILLOFACIAL W CONT   Final Result   IMPRESSION: 1.7 x 2.8 x 2.7 cm soft tissue abscess overlying the left mandibular   body with some superior extension. Medications During Visit:  Medications   butamben-tetracaine-benzocaine (CETACAINE) 2 %-2 %-14 % (200 mg/sec) topical spray 1 Spray (has no administration in time range)   lidocaine (PF) (XYLOCAINE) 20 mg/mL (2 %) injection (has no administration in time range)   lidocaine (XYLOCAINE) 20 mg/mL (2 %) injection 200 mg (has no administration in time range)   oxyCODONE-acetaminophen (PERCOCET) 5-325 mg per tablet 1 Tab (has no administration in time range)   dental ball (lidocaine/benadryl/benzocaine) mixture ( Other Given 10/7/20 4335)   sodium chloride 0.9 % bolus infusion 100 mL (100 mL IntraVENous New Bag 10/7/20 2320)   iopamidoL (ISOVUE 300) 61 % contrast injection 100 mL (100 mL IntraVENous Given 10/7/20 2321)   sodium chloride (NS) flush 10 mL (10 mL IntraVENous Given 10/7/20 2320)         DECISION MAKING:  Mario Scott is a 42 y.o. female who comes in as above. Blood count reveals mild leukocytosis. CT scan shows 1.7 x 2.8 x 2.7 soft tissue abscess overlying the left mandibular body with some superior extension. I gave the patient a nerve block and attempted to penetrate the abscess, but was unsuccessful.   I also attempted to assault oral surgery ([facility] oral and facial surgery), but the practice stated that no one is on-call for this evening. Patient does report significant provement in pain after receiving nerve block. She is able to swallow liquid and pills with no difficulty. No signs of airway compromise. She was encouraged to continue taking Motrin as prescribed, and to increase her clindamycin from the 300 mg twice daily to 450 mg 3 times daily. She was also given Percocet to take as needed. Most importantly, patient was strongly encouraged to call her dentist first thing in the morning to be seen as soon as possible for a dental abscess along the area of her root canal.  If she was unable to reach her dentist, she was encouraged to make an appointment with [facility] oral and facial surgery (referral provided). Above results discussed and reviewed with the patient. Patient verbalized understanding of the care plan, including any changes to current outpatient medication regimen, discussed disease process, symptom control, and follow-up care. IMPRESSION:  1. Dental abscess        DISPOSITION:  Discharged      Current Discharge Medication List      START taking these medications    Details   oxyCODONE-acetaminophen (Percocet) 5-325 mg per tablet Take 1 Tab by mouth every six (6) hours as needed for Pain for up to 3 days. Max Daily Amount: 4 Tabs. Qty: 12 Tab, Refills: 0    Associated Diagnoses: Dental abscess      clindamycin (CLEOCIN) 150 mg capsule Take 3 Caps by mouth every eight (8) hours for 10 days. Qty: 90 Cap, Refills: 0              Follow-up Information     Follow up With Specialties Details Why Contact Info    Cindy Anderson MD Oral Surgery Schedule an appointment as soon as possible for a visit Make an appointment first thing in the morning to reevaluate the dental abscess [place] [phone]              The patient is asked to follow-up with their primary care provider in the next several days. They are to call tomorrow for an appointment. The patient is asked to return promptly for any increased concerns or worsening of symptoms. They can return to this emergency department or any other emergency department.

## 2020-10-08 NOTE — DISCHARGE INSTRUCTIONS
We did a CT scan which shows that you have an abscess where you had your dental work done. It is very important that you call the dentist office first thing tomorrow and get an appointment as soon as possible for further management. If you are unable to reach the dentist office that did your root canal, please call Dr. Mckenzie Billy immediately to make an appointment as soon as possible. Continue to take ibuprofen for pain. I am also prescribing you Percocet to use as needed. Your dentist prescribed you clindamycin 300 mg twice daily.   I am going to increase the dose of your antibiotic so you will now take clindamycin 450 mg 3 times daily

## 2020-10-08 NOTE — ED TRIAGE NOTES
Patient arrives ambulatory from home with CC of left lower Dental pain. Pain radiates to left ear. Patient had a root canal on Tuesday. Patient states she is taking ibuprofen without relief. Also taking Clindamycin. Denies fevers.

## 2021-01-08 ENCOUNTER — HOSPITAL ENCOUNTER (EMERGENCY)
Age: 41
Discharge: HOME | End: 2021-01-08
Attending: EMERGENCY MEDICINE
Payer: MEDICAID

## 2021-01-08 ENCOUNTER — APPOINTMENT (OUTPATIENT)
Dept: GENERAL RADIOLOGY | Age: 41
End: 2021-01-08
Attending: EMERGENCY MEDICINE
Payer: MEDICAID

## 2021-01-08 VITALS
OXYGEN SATURATION: 100 % | HEIGHT: 59 IN | HEART RATE: 92 BPM | DIASTOLIC BLOOD PRESSURE: 78 MMHG | TEMPERATURE: 98.1 F | SYSTOLIC BLOOD PRESSURE: 128 MMHG | RESPIRATION RATE: 20 BRPM | BODY MASS INDEX: 34.22 KG/M2 | WEIGHT: 169.75 LBS

## 2021-01-08 DIAGNOSIS — S30.0XXA CONTUSION OF BUTTOCK, INITIAL ENCOUNTER: Primary | ICD-10-CM

## 2021-01-08 DIAGNOSIS — S93.401A SPRAIN OF RIGHT ANKLE, UNSPECIFIED LIGAMENT, INITIAL ENCOUNTER: ICD-10-CM

## 2021-01-08 PROCEDURE — 73610 X-RAY EXAM OF ANKLE: CPT

## 2021-01-08 PROCEDURE — 74011000250 HC RX REV CODE- 250: Performed by: PHYSICIAN ASSISTANT

## 2021-01-08 PROCEDURE — 74011250636 HC RX REV CODE- 250/636: Performed by: PHYSICIAN ASSISTANT

## 2021-01-08 PROCEDURE — 99283 EMERGENCY DEPT VISIT LOW MDM: CPT

## 2021-01-08 PROCEDURE — 96372 THER/PROPH/DIAG INJ SC/IM: CPT

## 2021-01-08 PROCEDURE — 72220 X-RAY EXAM SACRUM TAILBONE: CPT

## 2021-01-08 RX ORDER — LIDOCAINE 4 G/100G
PATCH TOPICAL
Qty: 30 PATCH | Refills: 0 | Status: SHIPPED | OUTPATIENT
Start: 2021-01-08

## 2021-01-08 RX ORDER — KETOROLAC TROMETHAMINE 30 MG/ML
30 INJECTION, SOLUTION INTRAMUSCULAR; INTRAVENOUS
Status: COMPLETED | OUTPATIENT
Start: 2021-01-08 | End: 2021-01-08

## 2021-01-08 RX ORDER — HYDROCODONE BITARTRATE AND ACETAMINOPHEN 5; 325 MG/1; MG/1
1 TABLET ORAL
Qty: 8 TAB | Refills: 0 | Status: SHIPPED | OUTPATIENT
Start: 2021-01-08 | End: 2021-01-11

## 2021-01-08 RX ORDER — IBUPROFEN 600 MG/1
600 TABLET ORAL
Qty: 20 TAB | Refills: 0 | Status: SHIPPED | OUTPATIENT
Start: 2021-01-08

## 2021-01-08 RX ORDER — LIDOCAINE 4 G/100G
1 PATCH TOPICAL EVERY 24 HOURS
Status: DISCONTINUED | OUTPATIENT
Start: 2021-01-08 | End: 2021-01-08

## 2021-01-08 RX ORDER — LIDOCAINE 4 G/100G
2 PATCH TOPICAL EVERY 24 HOURS
Status: DISCONTINUED | OUTPATIENT
Start: 2021-01-08 | End: 2021-01-08 | Stop reason: HOSPADM

## 2021-01-08 RX ADMIN — KETOROLAC TROMETHAMINE 30 MG: 30 INJECTION, SOLUTION INTRAMUSCULAR; INTRAVENOUS at 16:23

## 2021-01-08 NOTE — ED NOTES
Pt reports pain to lower back and buttocks as well as left arm and right leg following 2 falls in past week. Pt denies any urinary symptoms. Pt denies hitting head or loc. Pt standing and reports pain all over and her butt hurts too bad to sit. PA at bedside evaluating pt.

## 2021-01-08 NOTE — ED PROVIDER NOTES
EMERGENCY DEPARTMENT HISTORY AND PHYSICAL EXAM      Date: 1/8/2021  Patient Name: Dev Perez    History of Presenting Illness     Chief Complaint   Patient presents with    Fall     Patient reports she fell from the top to the bottom of her steps on yesterday morning. Patient is c/o coccyx and R ankle pain. History Provided By: Patient    HPI: Dev Perez, 47 y.o. female with PMHx significant for diabetes, hyperlipidemia, presents to the ED with cc of fall. Patient reports she slipped and inverted her right ankle 1 week ago. Since then, she has had pain to her right lateral ankle that is worse with ambulation and movement. Yesterday, she was going down the stairs when she slipped and fell down 16 stairs. Since then, she has had severe pain to her lower back and bilateral buttocks. She has associated generalized achiness and a bruise to her left elbow. She has tried taking Tylenol without relief of pain. She reports it is hard to sit down due to the pain. She denies lower extremity numbness or weakness, bowel or bladder dysfunction, saddle anesthesia, head injury. There are no other complaints, changes, or physical findings at this time. PCP: UNKNOWN    No current facility-administered medications on file prior to encounter. Current Outpatient Medications on File Prior to Encounter   Medication Sig Dispense Refill    CHOLECALCIFEROL, VITAMIN D3, (VITAMIN D3 PO) Take  by mouth.  insulin glargine (LANTUS SOLOSTAR U-100 INSULIN) 100 unit/mL (3 mL) inpn Inject 70 units at bedtime 25 Pen 3    metFORMIN ER (GLUCOPHAGE XR) 500 mg tablet Take 2 Tabs by mouth Before breakfast and dinner. 360 Tab 3    Liraglutide (VICTOZA 3-DEBRA) 0.6 mg/0.1 mL (18 mg/3 mL) pnij 1.8 mg by SubCUTAneous route daily. Titrate up to 1.8mg daily as direcected 9 Pen 3    canagliflozin (INVOKANA) 300 mg tablet Take 1 Tab by mouth Daily (before breakfast).  90 Tab 3    glucose blood VI test strips (ONETOUCH VERIO) strip Use to test blood glucose 5x daily 100 Strip 11    Insulin Needles, Disposable, (YOUNG PEN NEEDLE) 32 gauge x \" ndle Use with insulin pens to inject up to 4 times daily 200 Pen Needle 7    simvastatin (ZOCOR) 20 mg tablet Take 1 Tab by mouth nightly. (Patient taking differently: Take 20 mg by mouth. Take one tab in the AM and one in the PM) 90 Tab 3    lisinopril (PRINIVIL, ZESTRIL) 5 mg tablet Take 1 Tab by mouth daily. 90 Tab 3    Blood-Glucose Meter (ONETOUCH VERIO FLEX) misc Use to test blood sugar 8 times daily 1 Each 0    PNV with Ca,No.71-Iron-FA 27-1 mg tab Take  by mouth. Past History     Past Medical History:  Past Medical History:   Diagnosis Date    Diabetes ([address]     Diabetes mellitus ([address]     insulin    Hyperlipidemia     Obesity        Past Surgical History:  Past Surgical History:   Procedure Laterality Date    HX  SECTION  2011    breech    [place]  DELIV[place] ONLY      2011       Family History:  Family History   Problem Relation Age of Onset    Diabetes Mother     Diabetes Father     Diabetes Sister     Diabetes Brother        Social History:  Social History     Tobacco Use    Smoking status: Never Smoker    Smokeless tobacco: Never Used   Substance Use Topics    Alcohol use: Not Currently    Drug use: Never       Allergies:  No Known Allergies      Review of Systems   Review of Systems   Constitutional: Negative for chills and fever. HENT: Negative for ear pain and sore throat. Eyes: Negative for redness and visual disturbance. Respiratory: Negative for cough and shortness of breath. Cardiovascular: Negative for chest pain and palpitations. Gastrointestinal: Negative for abdominal pain, nausea and vomiting. Genitourinary: Negative for dysuria and hematuria. Musculoskeletal: Positive for back pain and myalgias. Negative for gait problem. +right ankle pain   Skin: Negative for rash and wound.    Neurological: Negative for dizziness and headaches. Psychiatric/Behavioral: Negative for behavioral problems and confusion. All other systems reviewed and are negative. Physical Exam   Physical Exam  Constitutional:       Appearance: She is not toxic-appearing. Comments: Patient is standing in the room, appears slightly uncomfortable. HENT:      Head: Normocephalic and atraumatic. Mouth/Throat:      Mouth: Mucous membranes are moist.   Eyes:      Extraocular Movements: Extraocular movements intact. Pupils: Pupils are equal, round, and reactive to light. Neck:      Musculoskeletal: Normal range of motion and neck supple. Cardiovascular:      Rate and Rhythm: Normal rate and regular rhythm. Pulmonary:      Effort: Pulmonary effort is normal. No respiratory distress. Musculoskeletal:      Left elbow: She exhibits normal range of motion, no swelling and no deformity. Tenderness (mild diffuse) found. Right ankle: She exhibits normal range of motion, no swelling, no ecchymosis and no deformity. Tenderness. Lateral malleolus tenderness found. No medial malleolus tenderness found. Back:    Skin:     General: Skin is warm and dry. Neurological:      General: No focal deficit present. Mental Status: She is alert and oriented to person, place, and time. Psychiatric:         Behavior: Behavior normal.           Diagnostic Study Results     Labs -   No results found for this or any previous visit (from the past 12 hour(s)). Radiologic Studies -   XR ANKLE RT MIN 3 V   Final Result   IMPRESSION: No acute abnormality. XR SACRUM AND COCCYX   Final Result   IMPRESSION: No acute abnormality        CT Results  (Last 48 hours)    None        CXR Results  (Last 48 hours)    None            Medical Decision Making   I am the first provider for this patient.     I reviewed the vital signs, available nursing notes, past medical history, past surgical history, family history and social history.    Vital Signs-Reviewed the patient's vital signs.  Patient Vitals for the past 12 hrs:   Temp Pulse Resp BP SpO2   01/08/21 1512 98.1 °F (36.7 °C) 92 20 128/78 100 %         Records Reviewed: Nursing Notes and Old Medical Records      Provider Notes (Medical Decision Making):   DDx: fracture, contusion, sprain    Patient presents with injuries after a fall. She has no back pain red flag signs and neurological exam is intact. Plan for x-rays and analgesia.      ED Course:   Initial assessment performed. The patients presenting problems have been discussed, and they are in agreement with the care plan formulated and outlined with them.  I have encouraged them to ask questions as they arise throughout their visit.    ED Course as of Jan 08 1958 Fri Jan 08, 2021   1705 Reassessed patient, she is still having a lot of pain. Discussed that we cannot give stronger pain medication here since she is driving but I will prescribe a short amount of pain medication for home. Advised ice to the affected area and using a donut cushion to sit. Discussed follow up and return precautions.    [ANDREA]      ED Course User Index  [ANDREA] Sara Hollins PA         Disposition:  5:30 PM  The patient has been re-evaluated and is ready for discharge. Reviewed available results with patient. Counseled patient on diagnosis and care plan. Patient has expressed understanding, and all questions have been answered. Patient agrees with plan and agrees to follow up as recommended, or to return to the ED if their symptoms worsen. Discharge instructions have been provided and explained to the patient, along with reasons to return to the ED.      PLAN:  1.   Discharge Medication List as of 1/8/2021  5:30 PM      START taking these medications    Details   HYDROcodone-acetaminophen (Norco) 5-325 mg per tablet Take 1 Tab by mouth every four (4) hours as needed for Pain for up to 3 days. Max Daily Amount: 6 Tabs., Normal, Disp-8 Tab, R-0     ibuprofen (MOTRIN) 600 mg tablet Take 1 Tab by mouth every six (6) hours as needed for Pain., Normal, Disp-20 Tab, R-0      lidocaine 4 % patch Apply patch to the area of pain for 12 hours, then remove for 12 hours. , Normal, Disp-30 Patch, R-0         CONTINUE these medications which have NOT CHANGED    Details   CHOLECALCIFEROL, VITAMIN D3, (VITAMIN D3 PO) Take  by mouth., Historical Med      insulin glargine (LANTUS SOLOSTAR U-100 INSULIN) 100 unit/mL (3 mL) inpn Inject 70 units at bedtime, PrintPlease consider 90 day supplies to promote better adherenceDisp-25 Pen, R-3      metFORMIN ER (GLUCOPHAGE XR) 500 mg tablet Take 2 Tabs by mouth Before breakfast and dinner., Print, Disp-360 Tab, R-3      Liraglutide (VICTOZA 3-DEBRA) 0.6 mg/0.1 mL (18 mg/3 mL) pnij 1.8 mg by SubCUTAneous route daily. Titrate up to 1.8mg daily as direcected, Print, Disp-9 Pen, R-3      canagliflozin (INVOKANA) 300 mg tablet Take 1 Tab by mouth Daily (before breakfast). , Print, Disp-90 Tab, R-3      glucose blood VI test strips (ONETOUCH VERIO) strip Use to test blood glucose 5x daily, Normal, Disp-100 Strip, R-11      Insulin Needles, Disposable, (YOUNG PEN NEEDLE) 32 gauge x 5/32\" ndle Use with insulin pens to inject up to 4 times daily, Normal, Disp-200 Pen Needle, R-7      simvastatin (ZOCOR) 20 mg tablet Take 1 Tab by mouth nightly. , Print, Disp-90 Tab, R-3      lisinopril (PRINIVIL, ZESTRIL) 5 mg tablet Take 1 Tab by mouth daily. , Print, Disp-90 Tab, R-3      Blood-Glucose Meter (ONETOUCH VERIO FLEX) misc Use to test blood sugar 8 times daily, Normal, Disp-1 Each, R-0      PNV with Ca,No.71-Iron-FA 27-1 mg tab Take  by mouth., Historical Med           2.    Follow-up Information     Follow up With Specialties Details Why Contact Info    Primary care provider  Call  to establish care and follow up     [facility] EMERGENCY DEPT Emergency Medicine Go to  If symptoms worsen [address]  [address] [facility]  [phone]        Return to ED if worse     Diagnosis     Clinical Impression:   1. Contusion of buttock, initial encounter    2. Sprain of right ankle, unspecified ligament, initial encounter            Lettbob Tamayo.  MARIO Hollins

## 2021-01-08 NOTE — DISCHARGE INSTRUCTIONS
Local Primary Care Physicians     [facility] [phone]   MD Aleshia Herrera MD     [facility] [phone]   Luke Sarkar, [facility]   MD Brayan Rehman MD Genevia Coffee, MD [address]  [phone]   MD Jluis Mckeon MD    [address] [facility] [phone]   Irvin Lee, MD Alyssa Dan, MD Fransisca Pierre MD     [facility] [phone]   ENHZ HOJFPX KZ, MD Briana Russell, MD Zhao [facility], NP     [address] [phone]   Alberto Song, MD Farzaneh Garcia, MD Val Strong, MD Frances Adkins, MD Rock Colón, MD Zen Mathew MD     [address] [facility]   Madisyn Nixon MD    [facility] [phone]   MD Aleta Pepper, NP   Nasir Singleton, MD Rai Bangura, MD Edin Desai, MD Marco Thomas MD     [address] [facility] [phone]   Gama Bernard, MD Brayden Clifton, [facility]   Ambar Allison, NP   Makeda Joseph, MD Surinder King, MD Gayatri Keita, MD Dylan Ag MD    [facility] [phone]   Gene Goodell, MD Ripley Senate, MD Idalia Avery MD Evetta Kelly, MD Girish Seaman MD     [address] [phone]   MD Dereck Calderon MD [place] [phone]   Josph Meigs, MD Freddy Marion, MD Ronita Polio, MD     [facility] [phone]   Beny Vazquez, MD Francois Ayala, MD Mariana Ortiz, MD Asya Stephens, MD Jaspal Cloon, NP   Bryan Langston MD     [address] [phone]   MD Milton Sadler MD Conception Dust, MD     [address] [facility] [phone]     MD Korey Ochoa, RASHARD Landa, MARIO Landa, MARIO Clark, MD Gay Cueva, ELIZABETH [place],

## 2021-07-06 ENCOUNTER — TRANSCRIBE ORDER (OUTPATIENT)
Dept: SCHEDULING | Age: 41
End: 2021-07-06

## 2021-07-06 DIAGNOSIS — R12 HEARTBURN: ICD-10-CM

## 2021-07-06 DIAGNOSIS — R10.13 EPIGASTRIC PAIN: ICD-10-CM

## 2021-07-06 DIAGNOSIS — R11.2 NAUSEA AND VOMITING: ICD-10-CM

## 2021-07-06 DIAGNOSIS — R10.9 ABDOMINAL PAIN: Primary | ICD-10-CM

## 2021-07-06 DIAGNOSIS — R14.0 BLOATING: ICD-10-CM

## 2021-07-07 ENCOUNTER — TRANSCRIBE ORDER (OUTPATIENT)
Dept: SCHEDULING | Age: 41
End: 2021-07-07

## 2021-07-07 DIAGNOSIS — R10.13 EPIGASTRIC PAIN: Primary | ICD-10-CM

## 2021-07-07 DIAGNOSIS — R11.2 NAUSEA WITH VOMITING: ICD-10-CM

## 2021-07-07 DIAGNOSIS — R14.0 BLOATING: ICD-10-CM

## 2021-07-07 DIAGNOSIS — R10.9 ABDOMINAL PAIN: ICD-10-CM

## 2021-07-07 DIAGNOSIS — R12 HEARTBURN: ICD-10-CM

## 2021-07-12 ENCOUNTER — HOSPITAL ENCOUNTER (OUTPATIENT)
Dept: PREADMISSION TESTING | Age: 41
Discharge: HOME | End: 2021-07-12
Payer: MEDICAID

## 2021-07-12 ENCOUNTER — TRANSCRIBE ORDER (OUTPATIENT)
Dept: REGISTRATION | Age: 41
End: 2021-07-12

## 2021-07-12 DIAGNOSIS — Z01.812 PRE-PROCEDURE LAB EXAM: ICD-10-CM

## 2021-07-12 DIAGNOSIS — Z01.812 PRE-PROCEDURE LAB EXAM: Primary | ICD-10-CM

## 2021-07-12 PROCEDURE — U0005 INFEC AGEN DETEC AMPLI PROBE: HCPCS

## 2021-07-13 LAB
SARS-COV-2, XPLCVT: NOT DETECTED
SOURCE, COVRS: NORMAL

## 2021-07-15 ENCOUNTER — HOSPITAL ENCOUNTER (OUTPATIENT)
Age: 41
Setting detail: OUTPATIENT SURGERY
Discharge: HOME | End: 2021-07-15
Attending: INTERNAL MEDICINE | Admitting: INTERNAL MEDICINE
Payer: MEDICAID

## 2021-07-15 ENCOUNTER — ANESTHESIA EVENT (OUTPATIENT)
Dept: ENDOSCOPY | Age: 41
End: 2021-07-15
Payer: MEDICAID

## 2021-07-15 ENCOUNTER — ANESTHESIA (OUTPATIENT)
Dept: ENDOSCOPY | Age: 41
End: 2021-07-15
Payer: MEDICAID

## 2021-07-15 VITALS
HEIGHT: 57 IN | SYSTOLIC BLOOD PRESSURE: 114 MMHG | DIASTOLIC BLOOD PRESSURE: 70 MMHG | OXYGEN SATURATION: 98 % | BODY MASS INDEX: 37.11 KG/M2 | WEIGHT: 172 LBS | TEMPERATURE: 98 F | RESPIRATION RATE: 17 BRPM | HEART RATE: 101 BPM

## 2021-07-15 LAB
GLUCOSE BLD STRIP.AUTO-MCNC: 114 MG/DL (ref 65–117)
SERVICE CMNT-IMP: NORMAL

## 2021-07-15 PROCEDURE — 74011000250 HC RX REV CODE- 250: Performed by: NURSE ANESTHETIST, CERTIFIED REGISTERED

## 2021-07-15 PROCEDURE — 76040000019: Performed by: INTERNAL MEDICINE

## 2021-07-15 PROCEDURE — 74011250636 HC RX REV CODE- 250/636: Performed by: NURSE ANESTHETIST, CERTIFIED REGISTERED

## 2021-07-15 PROCEDURE — 2709999900 HC NON-CHARGEABLE SUPPLY: Performed by: INTERNAL MEDICINE

## 2021-07-15 PROCEDURE — 76060000031 HC ANESTHESIA FIRST 0.5 HR: Performed by: INTERNAL MEDICINE

## 2021-07-15 PROCEDURE — 74011250636 HC RX REV CODE- 250/636: Performed by: INTERNAL MEDICINE

## 2021-07-15 PROCEDURE — 82962 GLUCOSE BLOOD TEST: CPT

## 2021-07-15 PROCEDURE — 77030021593 HC FCPS BIOP ENDOSC BSC -A: Performed by: INTERNAL MEDICINE

## 2021-07-15 PROCEDURE — 88305 TISSUE EXAM BY PATHOLOGIST: CPT

## 2021-07-15 RX ORDER — NALOXONE HYDROCHLORIDE 0.4 MG/ML
0.4 INJECTION, SOLUTION INTRAMUSCULAR; INTRAVENOUS; SUBCUTANEOUS
Status: DISCONTINUED | OUTPATIENT
Start: 2021-07-15 | End: 2021-07-15 | Stop reason: HOSPADM

## 2021-07-15 RX ORDER — PROPOFOL 10 MG/ML
INJECTION, EMULSION INTRAVENOUS AS NEEDED
Status: DISCONTINUED | OUTPATIENT
Start: 2021-07-15 | End: 2021-07-15 | Stop reason: HOSPADM

## 2021-07-15 RX ORDER — LIDOCAINE HYDROCHLORIDE 20 MG/ML
INJECTION, SOLUTION EPIDURAL; INFILTRATION; INTRACAUDAL; PERINEURAL AS NEEDED
Status: DISCONTINUED | OUTPATIENT
Start: 2021-07-15 | End: 2021-07-15 | Stop reason: HOSPADM

## 2021-07-15 RX ORDER — FENTANYL CITRATE 50 UG/ML
25-200 INJECTION, SOLUTION INTRAMUSCULAR; INTRAVENOUS
Status: DISCONTINUED | OUTPATIENT
Start: 2021-07-15 | End: 2021-07-15 | Stop reason: HOSPADM

## 2021-07-15 RX ORDER — FLUMAZENIL 0.1 MG/ML
0.2 INJECTION INTRAVENOUS
Status: DISCONTINUED | OUTPATIENT
Start: 2021-07-15 | End: 2021-07-15 | Stop reason: HOSPADM

## 2021-07-15 RX ORDER — SODIUM CHLORIDE 0.9 % (FLUSH) 0.9 %
5-40 SYRINGE (ML) INJECTION EVERY 8 HOURS
Status: DISCONTINUED | OUTPATIENT
Start: 2021-07-15 | End: 2021-07-15 | Stop reason: HOSPADM

## 2021-07-15 RX ORDER — INSULIN GLARGINE 100 [IU]/ML
INJECTION, SOLUTION SUBCUTANEOUS
COMMUNITY
End: 2021-11-10 | Stop reason: SDUPTHER

## 2021-07-15 RX ORDER — EPINEPHRINE 0.1 MG/ML
1 INJECTION INTRACARDIAC; INTRAVENOUS
Status: DISCONTINUED | OUTPATIENT
Start: 2021-07-15 | End: 2021-07-15 | Stop reason: HOSPADM

## 2021-07-15 RX ORDER — SODIUM CHLORIDE 0.9 % (FLUSH) 0.9 %
5-40 SYRINGE (ML) INJECTION AS NEEDED
Status: DISCONTINUED | OUTPATIENT
Start: 2021-07-15 | End: 2021-07-15 | Stop reason: HOSPADM

## 2021-07-15 RX ORDER — MIDAZOLAM HYDROCHLORIDE 1 MG/ML
.25-5 INJECTION, SOLUTION INTRAMUSCULAR; INTRAVENOUS
Status: DISCONTINUED | OUTPATIENT
Start: 2021-07-15 | End: 2021-07-15 | Stop reason: HOSPADM

## 2021-07-15 RX ORDER — SODIUM CHLORIDE 9 MG/ML
INJECTION, SOLUTION INTRAVENOUS
Status: DISCONTINUED | OUTPATIENT
Start: 2021-07-15 | End: 2021-07-15 | Stop reason: HOSPADM

## 2021-07-15 RX ORDER — ATROPINE SULFATE 0.1 MG/ML
0.5 INJECTION INTRAVENOUS
Status: DISCONTINUED | OUTPATIENT
Start: 2021-07-15 | End: 2021-07-15 | Stop reason: HOSPADM

## 2021-07-15 RX ORDER — SODIUM CHLORIDE 9 MG/ML
50 INJECTION, SOLUTION INTRAVENOUS CONTINUOUS
Status: DISCONTINUED | OUTPATIENT
Start: 2021-07-15 | End: 2021-07-15 | Stop reason: HOSPADM

## 2021-07-15 RX ORDER — DEXTROMETHORPHAN/PSEUDOEPHED 2.5-7.5/.8
1.2 DROPS ORAL
Status: DISCONTINUED | OUTPATIENT
Start: 2021-07-15 | End: 2021-07-15 | Stop reason: HOSPADM

## 2021-07-15 RX ADMIN — SODIUM CHLORIDE: 900 INJECTION, SOLUTION INTRAVENOUS at 17:49

## 2021-07-15 RX ADMIN — PROPOFOL 50 MG: 10 INJECTION, EMULSION INTRAVENOUS at 17:56

## 2021-07-15 RX ADMIN — LIDOCAINE HYDROCHLORIDE 100 MG: 20 INJECTION, SOLUTION EPIDURAL; INFILTRATION; INTRACAUDAL; PERINEURAL at 17:52

## 2021-07-15 RX ADMIN — PROPOFOL 200 MG: 10 INJECTION, EMULSION INTRAVENOUS at 17:52

## 2021-07-15 NOTE — PROGRESS NOTES

## 2021-07-15 NOTE — PROCEDURES
[address] [facility], [address] [facility] (EGD) Procedure Note    Melvina Michaels  1980  [id number]      Procedure: Endoscopic Gastroduodenoscopy with biopsy    Indication:  Abdominal pain, epigastric, Vomiting, persitent of unclear etilogy     Pre-operative Diagnosis: see indication above    Post-operative Diagnosis: see findings below    : Dereck Rubio MD    Surgical Assistant: Endoscopy Technician-1: Lazaro Kc  Endoscopy RN-1: Lynn Singletary RN    Implants:  None    Referring Provider:  None      Anesthesia/Sedation:  MAC anesthesia Propofol        Procedure Details     After infomed consent was obtained for the procedure, with all risks and benefits of procedure explained the patient was taken to the endoscopy suite and placed in the left lateral decubitus position. Following sequential administration of sedation as per above, the endoscope was inserted into the mouth and advanced under direct vision to third portion of the duodenum. A careful inspection was made as the gastroscope was withdrawn, including a retroflexed view of the proximal stomach; findings and interventions are described below. Findings:   Esophagus:normal  Stomach: erosive gastritis in antrum, biopsies taken   Duodenum/jejunum: normal, random biopsies taken       Therapies:  none    Specimens: as above         EBL: None      Complications:   None; patient tolerated the procedure well. Impression:    -See post-procedure diagnoses.     Recommendations:  -Continue acid suppression with protonix for 6 months  -avoid NSAIDS  -awaiting US of abdomen  -f/u in 2 months    Signed By: Dereck Rubio MD     7/15/2021  6:02 PM

## 2021-07-15 NOTE — ANESTHESIA PREPROCEDURE EVALUATION
Relevant Problems   No relevant active problems       Anesthetic History   No history of anesthetic complications            Review of Systems / Medical History  Patient summary reviewed, nursing notes reviewed and pertinent labs reviewed    Pulmonary  Within defined limits                 Neuro/Psych   Within defined limits           Cardiovascular  Within defined limits                     GI/Hepatic/Renal  Within defined limits              Endo/Other  Within defined limits  Diabetes         Other Findings              Physical Exam    Airway  Mallampati: II  TM Distance: > 6 cm  Neck ROM: normal range of motion   Mouth opening: Normal     Cardiovascular  Regular rate and rhythm,  S1 and S2 normal,  no murmur, click, rub, or gallop             Dental  No notable dental hx       Pulmonary  Breath sounds clear to auscultation               Abdominal  GI exam deferred       Other Findings            Anesthetic Plan    ASA: 2  Anesthesia type: MAC          Induction: Intravenous  Anesthetic plan and risks discussed with: Patient

## 2021-07-15 NOTE — H&P
The patient is a 47year old female who presents with a complaint of Bloating. The patient presents due to reoccurrence of symptoms. The onset of the bloating has been chronic. The symptoms have been associated with abdominal pain, while the symptoms have not been associated with amenorrhea, anorexia, anxiety, bloody stools, chewing gum, constipation, diarrhea, drinking large amounts of carbonated beverages, dysmenorrhea, dysuria, hard stools, hematemesis, hematuria, history of abdominal surgery, melena, peripheral edema, poor fitting dentures, possible pregnancy, post-nasal drip, rapid eating, steatorrhea, vaginal bleeding, weight gain or weight loss. Note for \"Bloating\": she is from [facility], h/o poorly controlled DM, Hg A1C in 10 range. she c/o for 3 years of bloating, nausea, vomiting, epigastric pain, bloating, and early satiety, no constipation      Problem List/Past Medical Jhon Baron; 2021 9:59 AM)  Diabetes Mellitus, Type II    Hypercholesterolemia      Past Surgical History Jhon Kuo; 2021 9:59 AM)   Delivery      Allergies Jhon Baron; 2021 9:59 AM)  No Known Allergies  [2021]:  No Known Drug Allergies  [2021]:    Medication History (Tamia Baron; 2021 10:00 AM)  metFORMIN HCl  (500MG Tablet, Oral) Active. Lantus SoloStar  (100UNIT/ML Soln Pen-inj, Subcutaneous 54 untis twice a day) Active. HumaLOG KwikPen  (100UNIT/ML Soln Pen-inj, Subcutaneous 22 units once a day) Active. Trulicity  (6.5QP/8.9AY Soln Pen-inj, Subcutaneous) Active. Atorvastatin Calcium  (20MG Tablet, Oral) Active. Medications Reconciled     Family History Jhon Baron; 2021 9:59 AM)  Diabetes Mellitus Type II   Mother, Father, Sibling. Social History Jhon Baron; 2021 9:59 AM)  Blood Transfusion   No.  Alcohol Use   Has never drank. Employment status   N/a. Marital status   . Tobacco Use   Never smoker.     Health Maintenance History Destiney Kuo; 6/30/2021 9:59 AM)  Flu Vaccine   no  Pneumovax   no  COVID-19 vaccine   Date: 2021. one shot        Review of Systems Destiney Plummer; 6/30/2021 9:59 AM)  General Not Present- Chronic Fatigue, Poor Appetite, Weight Gain and Weight Loss. Skin Not Present- Itching, Rash and Skin Color Changes. HEENT Not Present- Hearing Loss and Vertigo. Respiratory Not Present- Difficulty Breathing and TB exposure. Cardiovascular Not Present- Chest Pain, Use of Antibiotics before Dental Procedures and Use of Blood Thinners. Gastrointestinal Present- See HPI. Musculoskeletal Not Present- Arthritis, Hip Replacement Surgery and Knee Replacement Surgery. Neurological Not Present- Weakness. Psychiatric Not Present- Depression. Endocrine Present- Diabetes. Not Present- Thyroid Problems. Hematology Not Present- Anemia. Vitals Destiney Plummer; 6/30/2021 10:05 AM)  6/30/2021 10:00 AM  Weight: 172 lb   Height: 60 in   Body Surface Area: 1.75 m²   Body Mass Index: 33.59 kg/m²    Temp.: 97.3° F     BP: 85/71 (Sitting, Left Arm, Standard)              Physical Exam (Aubrey Jauregui MD; 6/30/2021 1:19 PM)  General  Mental Status - Alert. General Appearance - Cooperative, Pleasant, Not in acute distress. Orientation - Oriented X3. Build & Nutrition - Well nourished and Well developed. Integumentary  General Characteristics  Overall examination of the patient's skin reveals - no rashes, no bruises and no spider angiomas. Color - normal coloration of skin. Head and Neck  Neck  Global Assessment - full range of motion and supple, no bruit auscultated on the right, no bruit auscultated on the left, non-tender, no lymphadenopathy. Thyroid  Gland Characteristics - normal size and consistency. Eye  Eyeball - Left - No Exophthalmos. Eyeball - Right - No Exophthalmos. Sclera/Conjunctiva - Left - No Jaundice. Sclera/Conjunctiva - Right - No Jaundice.     Chest and Lung Exam  Chest and lung exam reveals  - quiet, even and easy respiratory effort with no use of accessory muscles. Auscultation  Breath sounds - Normal. Adventitious sounds - No Adventitious sounds. Cardiovascular  Auscultation  Rhythm - Regular, No Tachycardia, No Bradycardia . Heart Sounds - Normal heart sounds , S1 WNL and S2 WNL, No S3, No Summation Gallop. Murmurs & Other Heart Sounds - Auscultation of the heart reveals - No Murmurs. Abdomen  Palpation/Percussion  Tenderness - Non-Tender. Rebound tenderness - No rebound. Rigidity (guarding) - No Rigidity. Dullness to percussion - No abnormal dullness to percussion. Liver - No hepatosplenomegaly. Abdominal Mass Palpable - No masses. Other Characteristics - No Ascites. Auscultation  Auscultation of the abdomen reveals - Bowel sounds normal, No Abdominal bruits and No Succussion splash. Rectal - Did not examine. Peripheral Vascular  Upper Extremity  Inspection - Left - Normal - No Clubbing, No Cyanosis, No Edema, Pulses Intact. Right - Normal - No Clubbing, No Cyanosis, No Edema, Pulses Intact. Palpation - Edema - Left - No edema. Right - No edema. Lower Extremity  Inspection - Left - Inspection Normal. Right - Inspection Normal. Palpation - Edema - Left - No edema. Right - No edema. Neurologic  Neurologic evaluation reveals  - Cranial nerves grossly intact, no focal neurologic deficits. Motor  Involuntary Movements - Asterixis - not present. Musculoskeletal  Global Assessment  Gait and Station - normal gait and station. Assessment & Plan (Farhana Smith MD; 6/30/2021 1:21 PM)  Epigastric pain (R10.13)  Impression: she is from [facility], h/o poorly controlled DM, Hg A1C in 10 range.  she c/o for 3 years of bloating, nausea, vomiting, epigastric pain, bloating, and early satiety, no constipation  EGD to look for any peptic ulcer disease  US of abdomen to look for gallstones  Current Plans  ABD complete Ultrasound / non-billable (01712)  CBC, PLATELETS & AUT DIFF  METABOLIC PANEL, COMPREHENSIVE  LIPASE  CELIAC DISEASE, COMP (09462)  Abdominal pain (R10.9)  Heartburn (R12)  Current Plans  Started Protonix 40MG, 1 (one) Tablet daily before breakfast, #90, 90 days starting 06/30/2021, Ref. x3.  Pt Education - GERD-Reflux Esophagitis: discussed with patient and provided information. Nausea with vomiting (R11.2)  Impression: suspect gastroparesis  instructed about better glycemic control  given instructions about gastroparesis diet  Current Plans  Endoscopy (75055) (Discussed risks and benefits with the patient to include: perforation, post polypectomy, or post biopsy bleeding, missed lesions, and sedation reactions.)  Started Zofran 4MG, 1 (one) Tablet every 4-6 hours prn nausea, #60, 30 days starting 06/30/2021, Ref. x3.  Bloating (R14.0)  Date of Surgery Update:  Francine Crump was seen and examined. History and physical has been reviewed. The patient has been examined. There have been no significant clinical changes since the completion of the originally dated History and Physical.  The patient was counseled at length about the risks of seth Covid-19 in the will-operative and post-operative states including the recovery window of their procedure. The patient was made aware that seth Covid-19 after a surgical procedure may worsen their prognosis for recovering from the virus and lend to a higher morbidity and or mortality risk. The patient was given the options of postponing their procedure. All of the risks, benefits, and alternatives were discussed. The patient does  wish to proceed with the procedure.     Signed By: Saeid Webber MD     July 15, 2021 5:47 PM

## 2021-07-15 NOTE — DISCHARGE INSTRUCTIONS
[address]  [place], [address]    EGD DISCHARGE INSTRUCTIONS    Heike Butler  [id number]  1980    Discomfort:  Sore throat- throat lozenges or warm salt water gargle  redness at IV site- apply warm compress to area; if redness or soreness persist- contact your physician  Gaseous discomfort- walking, belching will help relieve any discomfort  You may not operate a vehicle for 12 hours  You may not engage in an occupation involving machinery or appliances for rest of today  You may not drink alcoholic beverages for at least 12 hours  Avoid making any critical decisions for at least 24 hour  DIET  You may resume your regular diet - however -  remember your colon is empty and a heavy meal will produce gas. Avoid these foods:  vegetables, fried / greasy foods, carbonated drinks    ACTIVITY  You may resume your normal daily activities   Spend the remainder of the day resting -  avoid any strenuous activity. CALL M.D. ANY SIGN OF   Increasing pain, nausea, vomiting  Abdominal distension (swelling)  New increased bleeding (oral or rectal)  Fever (chills)  Pain in chest area  Bloody discharge from nose or mouth  Shortness of breath    Follow-up Instructions:   Call Dr. Addison Landon for any questions or problems and follow up with him in 6 weeks  Telephone # [phone]  Avoid NSAIDS  Continue on taking the already prescribed protonix for 6 months    ENDOSCOPY FINDINGS:   Your endoscopy showed inflammation and small non bleeding ulcers in your stomach, rest of exam was normal.    Signed By: Addison Landon MD     7/15/2021  6:04 PM         Learning About Coronavirus (COVID-19)  Coronavirus (COVID-19): Overview  What is coronavirus (COVID-19)? The coronavirus disease (COVID-19) is caused by a virus. It is an illness that was first found in [place], [place], in December 2019. It has since spread worldwide. The virus can cause fever, cough, and trouble breathing. In severe cases, it can cause pneumonia and make it hard to breathe without help. It can cause death. Coronaviruses are a large group of viruses. They cause the common cold. They also cause more serious illnesses like Middle East respiratory syndrome (MERS) and severe acute respiratory syndrome (SARS). COVID-19 is caused by a novel coronavirus. That means it's a new type that has not been seen in people before. This virus spreads person-to-person through droplets from coughing and sneezing. It can also spread when you are close to someone who is infected. And it can spread when you touch something that has the virus on it, such as a doorknob or a tabletop. What can you do to protect yourself from coronavirus (COVID-19)? The best way to protect yourself from getting sick is to:  · Avoid areas where there is an outbreak. · Avoid contact with people who may be infected. · Wash your hands often with soap or alcohol-based hand sanitizers. · Avoid crowds and try to stay at least 6 feet away from other people. · Wash your hands often, especially after you cough or sneeze. Use soap and water, and scrub for at least 20 seconds. If soap and water aren't available, use an alcohol-based hand . · Avoid touching your mouth, nose, and eyes. What can you do to avoid spreading the virus to others? To help avoid spreading the virus to others:  · Cover your mouth with a tissue when you cough or sneeze. Then throw the tissue in the trash. · Use a disinfectant to clean things that you touch often. · Stay home if you are sick or have been exposed to the virus. Don't go to school, work, or public areas. And don't use public transportation. · If you are sick:  ? Leave your home only if you need to get medical care. But call the doctor's office first so they know you're coming. And wear a face mask, if you have one.  ? If you have a face mask, wear it whenever you're around other people.  It can help stop the spread of the virus when you cough or sneeze. ? Clean and disinfect your home every day. Use household  and disinfectant wipes or sprays. Take special care to clean things that you grab with your hands. These include doorknobs, remote controls, phones, and handles on your refrigerator and microwave. And don't forget countertops, tabletops, bathrooms, and computer keyboards. When to call for help  Call 911 anytime you think you may need emergency care. For example, call if:  · You have severe trouble breathing. (You can't talk at all.)  · You have constant chest pain or pressure. · You are severely dizzy or lightheaded. · You are confused or can't think clearly. · Your face and lips have a blue color. · You pass out (lose consciousness) or are very hard to wake up. Call your doctor now if you develop symptoms such as:  · Shortness of breath. · Fever. · Cough. If you need to get care, call ahead to the doctor's office for instructions before you go. Make sure you wear a face mask, if you have one, to prevent exposing other people to the virus. Where can you get the latest information? The following health organizations are tracking and studying this virus. Their websites contain the most up-to-date information. [facility] [place] also learn what to do if you think you may have been exposed to the virus. · U.S. Centers for Disease Control and Prevention (CDC): The CDC provides updated news about the disease and travel advice. The website also tells you how to prevent the spread of infection. www.cdc.gov  · World Health Organization [facility]): WHO offers information about the virus outbreaks. WHO also has travel advice. www.who.int  Current as of: April 1, 2020               Content Version: 12.4  © 2675-9336 [employer].    Care instructions adapted under license by your healthcare professional. If you have questions about a medical condition or this instruction, always ask your healthcare professional. [employer] disclaims any warranty or liability for your use of this information.

## 2021-07-16 NOTE — ANESTHESIA POSTPROCEDURE EVALUATION
Post-Anesthesia Evaluation and Assessment    Patient: Reyes Mock MRN: [id number]  SSN: [id number]    YOB: 1980  Age: 47 y.o. Sex: female      I have evaluated the patient and they are stable and ready for discharge from the PACU. Cardiovascular Function/Vital Signs  Visit Vitals  /70   Pulse (!) 101   Temp 36.7 °C (98 °F)   Resp 17   Ht 4' 9\" (1.448 m)   Wt 78 kg (172 lb)   SpO2 98%   Breastfeeding No   BMI 37.22 kg/m²       Patient is status post MAC anesthesia for Procedure(s):  ESOPHAGOGASTRODUODENOSCOPY (EGD)  ESOPHAGOGASTRODUODENAL (EGD) BIOPSY. Nausea/Vomiting: None    Postoperative hydration reviewed and adequate. Pain:  Pain Scale 1: Numeric (0 - 10) (07/15/21 1820)  Pain Intensity 1: 0 (07/15/21 1820)   Managed    Neurological Status: At baseline    Mental Status, Level of Consciousness: Alert and  oriented to person, place, and time    Pulmonary Status:   O2 Device: None (Room air) (07/15/21 1820)   Adequate oxygenation and airway patent    Complications related to anesthesia: None    Post-anesthesia assessment completed. No concerns    Signed By: Reuben Buitrago MD     July 15, 2021              Procedure(s):  ESOPHAGOGASTRODUODENOSCOPY (EGD)  ESOPHAGOGASTRODUODENAL (EGD) BIOPSY. MAC    <BSHSIANPOST>    INITIAL Post-op Vital signs:   Vitals Value Taken Time   /70 07/15/21 1823   Temp 36.7 °C (98 °F) 07/15/21 1805   Pulse 93 07/15/21 1827   Resp 18 07/15/21 1827   SpO2 97 % 07/15/21 1826   Vitals shown include unvalidated device data.

## 2021-08-04 ENCOUNTER — HOSPITAL ENCOUNTER (OUTPATIENT)
Dept: ULTRASOUND IMAGING | Age: 41
Discharge: HOME | End: 2021-08-04
Attending: INTERNAL MEDICINE
Payer: MEDICAID

## 2021-08-04 DIAGNOSIS — R10.13 EPIGASTRIC PAIN: ICD-10-CM

## 2021-08-04 DIAGNOSIS — R12 HEARTBURN: ICD-10-CM

## 2021-08-04 DIAGNOSIS — R14.0 BLOATING: ICD-10-CM

## 2021-08-04 DIAGNOSIS — R11.2 NAUSEA AND VOMITING: ICD-10-CM

## 2021-08-04 DIAGNOSIS — R10.9 ABDOMINAL PAIN: ICD-10-CM

## 2021-08-04 PROCEDURE — 76700 US EXAM ABDOM COMPLETE: CPT

## 2021-08-22 ENCOUNTER — HOSPITAL ENCOUNTER (EMERGENCY)
Age: 41
Discharge: HOME | End: 2021-08-22
Attending: EMERGENCY MEDICINE
Payer: MEDICAID

## 2021-08-22 ENCOUNTER — APPOINTMENT (OUTPATIENT)
Dept: GENERAL RADIOLOGY | Age: 41
End: 2021-08-22
Attending: PHYSICIAN ASSISTANT
Payer: MEDICAID

## 2021-08-22 VITALS
DIASTOLIC BLOOD PRESSURE: 80 MMHG | WEIGHT: 176.81 LBS | OXYGEN SATURATION: 100 % | SYSTOLIC BLOOD PRESSURE: 121 MMHG | RESPIRATION RATE: 14 BRPM | BODY MASS INDEX: 38.15 KG/M2 | HEART RATE: 97 BPM | HEIGHT: 57 IN

## 2021-08-22 DIAGNOSIS — M25.512 ACUTE PAIN OF LEFT SHOULDER: Primary | ICD-10-CM

## 2021-08-22 PROCEDURE — 73030 X-RAY EXAM OF SHOULDER: CPT

## 2021-08-22 PROCEDURE — 96372 THER/PROPH/DIAG INJ SC/IM: CPT

## 2021-08-22 PROCEDURE — 74011250636 HC RX REV CODE- 250/636: Performed by: PHYSICIAN ASSISTANT

## 2021-08-22 PROCEDURE — 99282 EMERGENCY DEPT VISIT SF MDM: CPT

## 2021-08-22 RX ORDER — DIAZEPAM 5 MG/1
5 TABLET ORAL
Qty: 10 TABLET | Refills: 0 | Status: SHIPPED | OUTPATIENT
Start: 2021-08-22 | End: 2021-11-10 | Stop reason: ALTCHOICE

## 2021-08-22 RX ORDER — KETOROLAC TROMETHAMINE 30 MG/ML
30 INJECTION, SOLUTION INTRAMUSCULAR; INTRAVENOUS
Status: COMPLETED | OUTPATIENT
Start: 2021-08-22 | End: 2021-08-22

## 2021-08-22 RX ORDER — NAPROXEN 500 MG/1
500 TABLET ORAL 2 TIMES DAILY WITH MEALS
Qty: 20 TABLET | Refills: 0 | Status: SHIPPED | OUTPATIENT
Start: 2021-08-22 | End: 2021-09-01

## 2021-08-22 RX ADMIN — KETOROLAC TROMETHAMINE 30 MG: 30 INJECTION, SOLUTION INTRAMUSCULAR; INTRAVENOUS at 20:09

## 2021-08-23 NOTE — ED NOTES
Intermittent L shoulder pain that radiates into L arm over the past year. Pain became worse when she tried to lift a heavy bag of groceries 2 days ago.     Pt ambulatory to exam room for eval with HENRIK Hollins bracing L arm across chest

## 2021-08-23 NOTE — ED PROVIDER NOTES
EMERGENCY DEPARTMENT HISTORY AND PHYSICAL EXAM      Date: 8/22/2021  Patient Name: Guerita Macario    History of Presenting Illness     Chief Complaint   Patient presents with    Arm Pain     Pt arrived to ED from home with L arm pain, she is supposed to be in PT but can't go. History Provided By: Patient    HPI: Guerita aMcario, 47 y.o. female with PMHx significant for type 2 diabetes, presents to the ED with cc of left arm pain. Over the last year, the patient has pain to her left shoulder that radiates down her left arm. It is worse with movement and she has difficulty moving it. Her endocrinologist has referred her to physical therapy, but she says that she does not plan to go because she has 3 kids and does not have time to do it.  2 days ago, she lifted a bag of onions and the pain has been increased and severe since then. It feels better while holding her arm braced against her body. She has associated tingling to the hand. There are no other complaints, changes, or physical findings at this time. PCP: None    No current facility-administered medications on file prior to encounter. Current Outpatient Medications on File Prior to Encounter   Medication Sig Dispense Refill    metformin HCl (METFORMIN PO) Take 500 mg by mouth.  insulin lispro protamine/insulin lispro (HumaLOG Mix 50-50 Insuln U-100) 100 unit/mL (50-50) injection by SubCUTAneous route.  insulin glargine (Lantus U-100 Insulin) 100 unit/mL injection by SubCUTAneous route nightly.  dulaglutide (TRULICITY SC) by SubCUTAneous route.  ibuprofen (MOTRIN) 600 mg tablet Take 1 Tab by mouth every six (6) hours as needed for Pain. 20 Tab 0    lidocaine 4 % patch Apply patch to the area of pain for 12 hours, then remove for 12 hours. 30 Patch 0    CHOLECALCIFEROL, VITAMIN D3, (VITAMIN D3 PO) Take  by mouth.       insulin glargine (LANTUS SOLOSTAR U-100 INSULIN) 100 unit/mL (3 mL) inpn Inject 70 units at bedtime 25 Pen 3    metFORMIN ER (GLUCOPHAGE XR) 500 mg tablet Take 2 Tabs by mouth Before breakfast and dinner. 360 Tab 3    Liraglutide (VICTOZA 3-DEBRA) 0.6 mg/0.1 mL (18 mg/3 mL) pnij 1.8 mg by SubCUTAneous route daily. Titrate up to 1.8mg daily as direcected 9 Pen 3    canagliflozin (INVOKANA) 300 mg tablet Take 1 Tab by mouth Daily (before breakfast). 90 Tab 3    glucose blood VI test strips (ONETOUCH VERIO) strip Use to test blood glucose 5x daily 100 Strip 11    Insulin Needles, Disposable, (YOUNG PEN NEEDLE) 32 gauge x /32\" ndle Use with insulin pens to inject up to 4 times daily 200 Pen Needle 7    simvastatin (ZOCOR) 20 mg tablet Take 1 Tab by mouth nightly. (Patient taking differently: Take 20 mg by mouth. Take one tab in the AM and one in the PM) 90 Tab 3    lisinopril (PRINIVIL, ZESTRIL) 5 mg tablet Take 1 Tab by mouth daily. 90 Tab 3    Blood-Glucose Meter (ONETOUCH VERIO FLEX) misc Use to test blood sugar 8 times daily 1 Each 0    PNV with Ca,No.71-Iron-FA 27-1 mg tab Take  by mouth. Past History     Past Medical History:  Past Medical History:   Diagnosis Date    [facility]     Diabetes ([facility]     type 2    Diabetes mellitus ([facility]     insulin    Hyperlipidemia     Obesity        Past Surgical History:  Past Surgical History:   Procedure Laterality Date    HX  SECTION  2011    breech    HX GYN      C Section x 3    DE  DELIVERY ONLY             Family History:  Family History   Problem Relation Age of Onset    Diabetes Mother     Diabetes Father     Diabetes Sister     Diabetes Brother        Social History:  Social History     Tobacco Use    Smoking status: Never Smoker    Smokeless tobacco: Never Used   Substance Use Topics    Alcohol use: Never    Drug use: Never       Allergies: Allergies   Allergen Reactions    Victoza [Liraglutide] Swelling         Review of Systems   Review of Systems   Constitutional: Negative for chills and fever. HENT: Negative for ear pain and sore throat. Eyes: Negative for redness and visual disturbance. Respiratory: Negative for cough and shortness of breath. Cardiovascular: Negative for chest pain and palpitations. Gastrointestinal: Negative for abdominal pain, nausea and vomiting. Genitourinary: Negative for dysuria and hematuria. Musculoskeletal: Negative for back pain and gait problem. +left shoulder pain   Skin: Negative for rash and wound. Neurological: Negative for dizziness and headaches. Psychiatric/Behavioral: Negative for behavioral problems and confusion. All other systems reviewed and are negative. Physical Exam   Physical Exam  Constitutional:       Appearance: She is not toxic-appearing. HENT:      Head: Normocephalic and atraumatic. Mouth/Throat:      Mouth: Mucous membranes are moist.   Eyes:      Extraocular Movements: Extraocular movements intact. Pupils: Pupils are equal, round, and reactive to light. Cardiovascular:      Rate and Rhythm: Normal rate and regular rhythm. Pulmonary:      Effort: Pulmonary effort is normal. No respiratory distress. Musculoskeletal:         General: No deformity. Normal range of motion. Cervical back: Normal range of motion and neck supple. Comments: Tenderness to palpation of the left upper trapezius muscles, left upper pectoral muscles, and left upper arm diffusely. Decreased range of motion secondary to pain. No obvious deformity. 2+ radial pulse. Distal sensation and motor function intact. Skin:     General: Skin is warm and dry. Neurological:      General: No focal deficit present. Mental Status: She is alert and oriented to person, place, and time. Psychiatric:         Behavior: Behavior normal.           Diagnostic Study Results     Labs -   No results found for this or any previous visit (from the past 12 hour(s)).     Radiologic Studies -   XR SHOULDER LT AP/LAT MIN 2 V   Final Result   No acute abnormality. CT Results  (Last 48 hours)    None        CXR Results  (Last 48 hours)    None            Medical Decision Making   I am the first provider for this patient. I reviewed the vital signs, available nursing notes, past medical history, past surgical history, family history and social history. Vital Signs-Reviewed the patient's vital signs. Patient Vitals for the past 12 hrs:   Pulse Resp BP SpO2   08/22/21 1900 97 14 121/80 100 %         Records Reviewed: Nursing Notes and Old Medical Records      Provider Notes (Medical Decision Making):   DDx: adhesive capsulitis, osteoarthritis, shoulder impingement syndrome, rotator cuff tear    Patient presents with atraumatic left shoulder pain. She has muscular tenderness to palpation and decreased range of motion. Suspect chronic adhesive capsulitis related to her diabetes with acute muscle strain to the area. Patient would like to have an x-ray done today. Will treat symptomatically. ED Course:   Initial assessment performed. The patients presenting problems have been discussed, and they are in agreement with the care plan formulated and outlined with them. I have encouraged them to ask questions as they arise throughout their visit. Disposition:  8:44 PM  The patient has been re-evaluated and is ready for discharge. Reviewed available results with patient. Counseled patient on diagnosis and care plan. Patient has expressed understanding, and all questions have been answered. Patient agrees with plan and agrees to follow up as recommended, or to return to the ED if their symptoms worsen. Discharge instructions have been provided and explained to the patient, along with reasons to return to the ED. PLAN:  1. Discharge Medication List as of 8/22/2021  8:44 PM      START taking these medications    Details   diazePAM (Valium) 5 mg tablet Take 1 Tablet by mouth every eight (8) hours as needed for Muscle Spasm(s).  Max Daily Amount: 15 mg., Print, Disp-10 Tablet, R-0      naproxen (Naprosyn) 500 mg tablet Take 1 Tablet by mouth two (2) times daily (with meals) for 10 days. , Print, Disp-20 Tablet, R-0         CONTINUE these medications which have NOT CHANGED    Details   !! metformin HCl (METFORMIN PO) Take 500 mg by mouth., Historical Med      insulin lispro protamine/insulin lispro (HumaLOG Mix 50-50 Insuln U-100) 100 unit/mL (50-50) injection by SubCUTAneous route., Historical Med      insulin glargine (Lantus U-100 Insulin) 100 unit/mL injection by SubCUTAneous route nightly., Historical Med      dulaglutide (TRULICITY SC) by SubCUTAneous route., Historical Med      ibuprofen (MOTRIN) 600 mg tablet Take 1 Tab by mouth every six (6) hours as needed for Pain., Normal, Disp-20 Tab, R-0      lidocaine 4 % patch Apply patch to the area of pain for 12 hours, then remove for 12 hours. , Normal, Disp-30 Patch, R-0      CHOLECALCIFEROL, VITAMIN D3, (VITAMIN D3 PO) Take  by mouth., Historical Med      insulin glargine (LANTUS SOLOSTAR U-100 INSULIN) 100 unit/mL (3 mL) inpn Inject 70 units at bedtime, PrintPlease consider 90 day supplies to promote better adherenceDisp-25 Pen, R-3      !! metFORMIN ER (GLUCOPHAGE XR) 500 mg tablet Take 2 Tabs by mouth Before breakfast and dinner., Print, Disp-360 Tab, R-3      Liraglutide (VICTOZA 3-DEBRA) 0.6 mg/0.1 mL (18 mg/3 mL) pnij 1.8 mg by SubCUTAneous route daily. Titrate up to 1.8mg daily as direcected, Print, Disp-9 Pen, R-3      canagliflozin (INVOKANA) 300 mg tablet Take 1 Tab by mouth Daily (before breakfast). , Print, Disp-90 Tab, R-3      glucose blood VI test strips (ONETOUCH VERIO) strip Use to test blood glucose 5x daily, Normal, Disp-100 Strip, R-11      Insulin Needles, Disposable, (YOUNG PEN NEEDLE) 32 gauge x 5/32\" ndle Use with insulin pens to inject up to 4 times daily, Normal, Disp-200 Pen Needle, R-7      simvastatin (ZOCOR) 20 mg tablet Take 1 Tab by mouth nightly. , Print, Disp-90 Tab, R-3      lisinopril (PRINIVIL, ZESTRIL) 5 mg tablet Take 1 Tab by mouth daily. , Print, Disp-90 Tab, R-3      Blood-Glucose Meter (ONETOUCH VERIO FLEX) misc Use to test blood sugar 8 times daily, Normal, Disp-1 Each, R-0      PNV with Ca,No.71-Iron-FA 27-1 mg tab Take  by mouth., Historical Med       !! - Potential duplicate medications found. Please discuss with provider. 2.   Follow-up Information     Follow up With Specialties Details Why Contact Info    [facility]  Call  to schedule a follow up appointment [facility]  [address]  [address]   [phone]    [facility] EM[facility] DE[facility] Emergency Medicine Go to  If symptoms worsen [address] [place] [address]  [phone]        Return to ED if worse     Diagnosis     Clinical Impression:   1. Acute pain of left shoulder            Lucho Peace.  AMRIO Hollins

## 2021-09-21 LAB — HBA1C MFR BLD HPLC: 7.9 %

## 2021-11-10 ENCOUNTER — OFFICE VISIT (OUTPATIENT)
Dept: FAMILY MEDICINE CLINIC | Age: 41
End: 2021-11-10
Payer: MEDICAID

## 2021-11-10 VITALS
OXYGEN SATURATION: 100 % | DIASTOLIC BLOOD PRESSURE: 58 MMHG | RESPIRATION RATE: 16 BRPM | TEMPERATURE: 97.5 F | BODY MASS INDEX: 36.37 KG/M2 | WEIGHT: 180.4 LBS | HEIGHT: 59 IN | HEART RATE: 91 BPM | SYSTOLIC BLOOD PRESSURE: 110 MMHG

## 2021-11-10 DIAGNOSIS — G56.03 BILATERAL CARPAL TUNNEL SYNDROME: ICD-10-CM

## 2021-11-10 DIAGNOSIS — E11.65 UNCONTROLLED TYPE 2 DIABETES MELLITUS WITH HYPERGLYCEMIA (HCC): ICD-10-CM

## 2021-11-10 DIAGNOSIS — R20.0 BILATERAL HAND NUMBNESS: ICD-10-CM

## 2021-11-10 DIAGNOSIS — E16.2 HYPOGLYCEMIA: ICD-10-CM

## 2021-11-10 DIAGNOSIS — M75.02 ADHESIVE CAPSULITIS OF LEFT SHOULDER: ICD-10-CM

## 2021-11-10 DIAGNOSIS — S16.1XXS STRAIN OF NECK MUSCLE, SEQUELA: ICD-10-CM

## 2021-11-10 DIAGNOSIS — Z00.00 WELL ADULT EXAM: Primary | ICD-10-CM

## 2021-11-10 DIAGNOSIS — E78.2 MIXED HYPERLIPIDEMIA: ICD-10-CM

## 2021-11-10 PROCEDURE — 99395 PREV VISIT EST AGE 18-39: CPT | Performed by: FAMILY MEDICINE

## 2021-11-10 PROCEDURE — 20610 DRAIN/INJ JOINT/BURSA W/O US: CPT | Performed by: FAMILY MEDICINE

## 2021-11-10 RX ORDER — ATORVASTATIN CALCIUM 20 MG/1
20 TABLET, FILM COATED ORAL DAILY
COMMUNITY
Start: 2021-09-24

## 2021-11-10 RX ORDER — METHYLPREDNISOLONE ACETATE 40 MG/ML
40 INJECTION, SUSPENSION INTRA-ARTICULAR; INTRALESIONAL; INTRAMUSCULAR; SOFT TISSUE ONCE
Qty: 1 ML | Refills: 0
Start: 2021-11-10 | End: 2021-11-10

## 2021-11-10 RX ORDER — DULAGLUTIDE 0.75 MG/.5ML
0.75 INJECTION, SOLUTION SUBCUTANEOUS
COMMUNITY
Start: 2021-11-05

## 2021-11-10 RX ORDER — DICLOFENAC SODIUM 10 MG/G
2 GEL TOPICAL 4 TIMES DAILY
Qty: 100 G | Refills: 1 | Status: SHIPPED | OUTPATIENT
Start: 2021-11-10

## 2021-11-10 RX ORDER — PEN NEEDLE, DIABETIC 31 GX5/16"
NEEDLE, DISPOSABLE MISCELLANEOUS
COMMUNITY
Start: 2021-10-03

## 2021-11-10 RX ORDER — INSULIN LISPRO 100 [IU]/ML
INJECTION, SOLUTION INTRAVENOUS; SUBCUTANEOUS
COMMUNITY

## 2021-11-10 NOTE — PROGRESS NOTES
Chief Complaint   Patient presents with   [facility].     New patient    Discuss left arm pain and worse with lifting and reaching back

## 2021-11-10 NOTE — PROGRESS NOTES
Subjective:     Chief Complaint   Patient presents with   [facility] patient        She  is a 47 y.o. female who presents for evaluation of:  New patient establishing care. Previously following a crossover. She is [place]. PMH significant for DM 2, HLD, HTN, anxiety, obesity. Having pain with left shoulder. Thought to have adhesive capsulitis in setting of DM2. Sx x 2 years. Has not been using any medications regularly but was given shot of Toradol in the ER and Valium    X-ray left shoulder in the ER:  \"FINDINGS: Three views of the left shoulder demonstrate no fracture, dislocation  or other acute abnormality.     IMPRESSION  No acute abnormality. \"    Bilat hand numbness -  Worse when sleeping. Does a lot of work at home with taking care of 3 kids and baking. Tried ibuprofen but did not help with numbness. Sx x 2 months. Diabetes Mellitus:  Dx 11 yrs ago. Last A1c was 7.9% with meds. Prior to this has been chronically uncontrolled as A1c is frequently in the 10 to 11% range. Following with Endo at crossover    Currently taking Lantus 50 units bid, humalog 13 units with breakfast and 30 units lunch, Trulicity 3.93 mg weekly (trouble dian higher dose), Metformin 2000 mg daily. Not on Invokana 300 mg daily. Using freestyle edgar. Taking meds, home glucose monitoring: is performed regularly  fastings and about 140-180 before dinner. Occ hypoglycemia - down to 60s occ in night. Reports no polyuria or polydipsia, no chest pain, dyspnea or TIA's, has dysesthesias in the feet, last eye exam approximately > 1 yr ago. Exercises regularly with walking. Compliant with diabetic diet. Avoids sodas and sweet teas. Avoids fast food. Limits carbs. Pt is a non smoker.     Lab Results   Component Value Date/Time    Hemoglobin A1c (POC) 11.1 04/18/2018 04:00 PM    Hemoglobin A1c (POC) 9.6 12/05/2017 10:35 AM    Hemoglobin A1c 7.9 (H) 03/07/2017 10:40 AM    Microalb/Creat ratio (ug/mg creat.) 138.9 (H) 12/05/2017 11:07 AM    LDL,Direct 165 (H) 01/04/2019 09:36 AM    LDL, calculated Not calculated due to elevated triglyceride level 01/04/2019 09:36 AM    Creatinine (POC) 0.4 (L) 09/24/2012 06:24 PM    Creatinine 0.66 10/07/2020 10:44 PM      Lab Results   Component Value Date/Time    GFR est AA >60 10/07/2020 10:44 PM    GFR est non-AA >60 10/07/2020 10:44 PM      No results found for: TSH, TSHEXT, TSHEXT      Labs from Crossover:  Hemoglobin A1C (In-house)   2021-09-23     Hemoglobin A1C 7.9       Hemoglobin A1c (OB/HIV/MED)   2021-06-24     Hemoglobin A1c 10.2   4.8-5.6   CBC With Automated Diff (CBC with Differential) (OB/HIV/MED)   2021-06-24     WBC 9.2   3.4-10.8   RBC 4.70   3.77-5.28   Hemoglobin 11.0   11.1-15.9   Hematocrit 36.9   34.0-46. 6   MCV 79   79-97   MCH 23.4   26.6-33.0   MCHC 29.8   31.5-35.7   RDW 14.4   11.7-15.4   Platelets 907   800-189   Neutrophils 65   Not Estab. Lymphs 28   Not Estab. Monocytes 6   Not Estab. Eos 1   Not Estab. Basos 0   Not Estab. Neutrophils (Absolute) 5.9   1.4-7.0   Lymphs (Absolute) 2.6   0.7-3.1   Monocytes(Absolute) 0.6   0.1-0.9   Eos (Absolute) 0.1   0.0-0.4   Baso (Absolute) 0.0   0.0-0.2   Immature Granulocytes 0   Not Estab. Immature Grans (Abs) 0.0   0.0-0.1   Lipid Panel (OB/HIV/MED)   2021-06-24     Cholesterol, Total 123   100-199   Triglycerides 139   0-149   HDL Cholesterol 31   >39   VLDL Cholesterol Maikol 25   5-40   LDL Chol Calc ([facility]) 67   0-99   Comp.  Metabolic Panel (14) (OB/HIV/MED)   2021-06-24     Glucose 210   65-99   BUN 8   6-20   Creatinine 0.47   0.57-1.00   eGFR If NonAfricn Am 126   >59   eGFR If Africn Am 145   >59   BUN/Creatinine Ratio 17   9-23   Sodium 136   134-144   Potassium 4.5   3.5-5.2   Chloride 101      Carbon Dioxide, Total 23   20-29   Calcium 8.8   8.7-10.2   Protein, Total 6.6   6.0-8.5   Albumin 3.8   3.8-4.8   Globulin, Total 2.8   1.5-4.5   A/G Ratio 1.4   1.2-2.2   Bilirubin, Total 0.3   0.0-1.2   Alkaline Phosphatase 79      AST (SGOT) 17   0-40   ALT (SGPT) 14   0-32       ROS:  Constitutional: negative for fevers, chills and fatigue  Eyes: negative for visual disturbance  Ears, nose, mouth, throat, and face: negative for hearing loss and sore throat  Respiratory: negative for cough or dyspnea on exertion  Cardiovascular: negative for chest pain, dyspnea, palpitations, fatigue  Gastrointestinal: negative for nausea, vomiting, change in bowel habits, diarrhea and abdominal pain  Genitourinary:negative for frequency and dysuria  Integument/breast: negative for rash and skin lesion(s)  Hematologic/lymphatic: negative for easy bruising and bleeding  Musculoskeletal: Per HPI  Neurological: Per HPI  Behavioral/Psych: sleeps only about 4 hours per night. Has 1 child with special needs and frequently being woken up by her kids. Reports that she has no trouble falling asleep. Not interested in starting on a daily medication     Objective:     Vitals:    11/10/21 0939   BP: (!) 110/58   Pulse: 91   Resp: 16   Temp: 97.5 °F (36.4 °C)   TempSrc: Oral   SpO2: 100%   Weight: 180 lb 6.4 oz (81.8 kg)   Height: 4' 10.5\" (1.486 m)     Physical Examination:  General appearance - alert, well appearing, and in no distress  Eyes - sclera anicteric  Neck - supple, no significant adenopathy  Lymphatics - no palpable lymphadenopathy, no hepatosplenomegaly  Chest - clear to auscultation, no wheezes, rales or rhonchi, symmetric air entry  Heart - normal rate, regular rhythm, normal S1, S2, no murmurs, rubs, clicks or gallops  Abdomen - soft, nontender, nondistended, no masses or organomegaly  Neurological - alert, oriented, normal speech, no focal findings or movement disorder noted  Musculoskeletal -left shoulder with significantly restricted passive range of motion. Unable to flex > 90 degrees. Unable to extend passively beyond 30 degrees.   Unable to perform Neer's, Ford, liftoff, Apley scratch due to pain. Bilateral wrists with positive Phalen's and Tinel's. Extremities - no edema  Skin - no rashes or suspicious lesions  Psych - nml mood and affect    Procedure:  After verbal consent was obtained, risks and benefits of the procedure were discussed with the patient and alternatives discussed. Cleansed with alcohol pads. Injected mix of 50/50 1% lidocaine 1 ml and Depomedrol 40 mg into left shoulder via posterolateral approach. The procedure was well tolerated with no complications      Past Medical History:   Diagnosis Date    Diabetes [facility]     Diabetes ([facility]     type 2    Diabetes mellitus ([facility]     insulin    Hyperlipidemia     Obesity      Past Surgical History:   Procedure Laterality Date    HX  SECTION  2011    breech    HX GYN      C Section x 3    MO  DELIVERY ONLY           Current Outpatient Medications on File Prior to Visit   Medication Sig Dispense Refill    insulin lispro (HumaLOG KwikPen Insulin) 100 unit/mL kwikpen Inject 13 units with breakfast and 30 units with lunch      Trulicity 3.72 UJ/6.7 mL sub-q pen 0.75 mg by SubCUTAneous route every seven (7) days.  atorvastatin (LIPITOR) 20 mg tablet Take 20 mg by mouth daily.  lidocaine 4 % patch Apply patch to the area of pain for 12 hours, then remove for 12 hours. 30 Patch 0    insulin glargine (LANTUS SOLOSTAR U-100 INSULIN) 100 unit/mL (3 mL) inpn Inject 70 units at bedtime (Patient taking differently: Inject 50 units twice a day) 25 Pen 3    metFORMIN ER (GLUCOPHAGE XR) 500 mg tablet Take 2 Tabs by mouth Before breakfast and dinner.  360 Tab 3    glucose blood VI test strips (ONETOUCH VERIO) strip Use to test blood glucose 5x daily 100 Strip 11    Insulin Needles, Disposable, (YOUNG PEN NEEDLE) 32 gauge x 5/32\" ndle Use with insulin pens to inject up to 4 times daily 200 Pen Needle 7    Blood-Glucose Meter (ONETOUCH VERIO FLEX) misc Use to test blood sugar 8 times daily 1 Each 0    BD Ultra-Fine Short Pen Needle 31 gauge x 5/16\" ndle       [DISCONTINUED] diazePAM (Valium) 5 mg tablet Take 1 Tablet by mouth every eight (8) hours as needed for Muscle Spasm(s). Max Daily Amount: 15 mg. 10 Tablet 0    [DISCONTINUED] metformin HCl (METFORMIN PO) Take 500 mg by mouth.  [DISCONTINUED] insulin lispro protamine/insulin lispro (HumaLOG Mix 50-50 Insuln U-100) 100 unit/mL (50-50) injection by SubCUTAneous route.  [DISCONTINUED] insulin glargine (Lantus U-100 Insulin) 100 unit/mL injection by SubCUTAneous route nightly.  [DISCONTINUED] dulaglutide (TRULICITY SC) by SubCUTAneous route.  ibuprofen (MOTRIN) 600 mg tablet Take 1 Tab by mouth every six (6) hours as needed for Pain. (Patient not taking: Reported on 11/10/2021) 20 Tab 0    [DISCONTINUED] CHOLECALCIFEROL, VITAMIN D3, (VITAMIN D3 PO) Take  by mouth.  [DISCONTINUED] Liraglutide (VICTOZA 3-DEBRA) 0.6 mg/0.1 mL (18 mg/3 mL) pnij 1.8 mg by SubCUTAneous route daily. Titrate up to 1.8mg daily as direcected 9 Pen 3    [DISCONTINUED] canagliflozin (INVOKANA) 300 mg tablet Take 1 Tab by mouth Daily (before breakfast). 90 Tab 3    lisinopril (PRINIVIL, ZESTRIL) 5 mg tablet Take 1 Tab by mouth daily. (Patient not taking: Reported on 11/10/2021) 90 Tab 3    [DISCONTINUED] simvastatin (ZOCOR) 20 mg tablet Take 1 Tab by mouth nightly. (Patient taking differently: Take 20 mg by mouth. Take one tab in the AM and one in the PM) 90 Tab 3    [DISCONTINUED] PNV with Ca,No.71-Iron-FA 27-1 mg tab Take  by mouth. No current facility-administered medications on file prior to visit. Assessment/ Plan: Reviewed labs from crossover and ongoing concerns with diabetes control. Adding on Invokana and decreasing Lantus to 50 units in the a.m. and 40 units at night due to issues with hypoglycemia. Needs to continue seeing Endo and plan to do this through crossover. Encouraged major changes with diet and exercise.     Apparent adhesive capsulitis today and injected with steroid. Sending to physical therapy for this    Also appears to have bilateral carpal tunnel syndrome so encouraged diclofenac gel, stretching, ice, and sending for PT. We will also get x-rays of cervical spine as this may be contributing. If not improving over the next 4 weeks we will plan for EMG and further eval with neuro or Ortho hand based on results. Diagnoses and all orders for this visit:    1. Well adult exam    2. Uncontrolled type 2 diabetes mellitus with hyperglycemia (HCC)  -     canagliflozin (Invokana) 100 mg tablet; Take 1 Tablet by mouth Daily (before breakfast). 3. Hypoglycemia    4. Mixed hyperlipidemia    5. Bilateral hand numbness  -     XR SPINE CERV PA LAT ODONT 3 V MAX; Future  -     REFERRAL TO PHYSICAL THERAPY  -     diclofenac (VOLTAREN) 1 % gel; Apply 2 g to affected area four (4) times daily. 6. Adhesive capsulitis of left shoulder  -     methylPREDNISolone acetate (DEPO-MedroL) 40 mg/mL injection; 1 mL by IntraMUSCular route once for 1 dose. -     METHYLPREDNISOLONE ACETATE INJECTION 40 MG  -     NE DRAIN/INJECT LARGE JOINT/BURSA  -     REFERRAL TO PHYSICAL THERAPY  -     diclofenac (VOLTAREN) 1 % gel; Apply 2 g to affected area four (4) times daily. 7. Bilateral carpal tunnel syndrome  -     REFERRAL TO PHYSICAL THERAPY  -     diclofenac (VOLTAREN) 1 % gel; Apply 2 g to affected area four (4) times daily. 8. Strain of neck muscle, sequela  -     REFERRAL TO PHYSICAL THERAPY  -     diclofenac (VOLTAREN) 1 % gel; Apply 2 g to affected area four (4) times daily. I have discussed the diagnosis with the patient and the intended plan as seen in the above orders. The patient has received an after-visit summary and questions were answered concerning future plans. I have discussed medication side effects and warnings with the patient as well. The patient verbalizes understanding and agreement with the plan.       Follow-up and Dispositions    · Return in 4 weeks (on 12/8/2021).        [facility]  OFFICE PROCEDURE PROGRESS NOTE        Chart reviewed for the following:   Román Bond MD, have reviewed the History, Physical and updated the Allergic reactions for Angelica Paz     TIME OUT performed immediately prior to start of procedure:   I, Sean Monk MD, have performed the following reviews on Angelica Paz prior to the start of the procedure:            * Patient was identified by name and date of birth   * Agreement on procedure being performed was verified  * Risks and Benefits explained to the patient  * Procedure site verified and marked as necessary  * Patient was positioned for comfort  * Consent was signed and verified     Time: 11:15am      Date of procedure: 11/10/2021    Procedure performed by:  Sean Monk MD    Patient assisted by: self    How tolerated by patient: tolerated the procedure well with no complications    Post Procedural Pain Scale: 0 - No Hurt    Comments: none

## 2021-11-10 NOTE — PATIENT INSTRUCTIONS
Carpal Tunnel Syndrome: Care Instructions  Overview     Carpal tunnel syndrome is numbness, tingling, weakness, and pain in your hand, wrist, and sometimes forearm. It is caused by pressure on the median nerve. This nerve and several tough tissues called tendons run through a space in the wrist. This space is called the carpal tunnel. The repeated hand motions used in work and some hobbies and sports can put pressure on the median nerve. Pregnancy can cause carpal tunnel syndrome. Several conditions, such as diabetes, arthritis, and an underactive thyroid, can also cause it. You may be able to limit an activity or change the way you do it to reduce your symptoms. You also can take other steps to feel better. If your symptoms are mild, 1 to 2 weeks of home treatment are likely to ease your pain. Surgery is needed only if other treatments do not work. Follow-up care is a key part of your treatment and safety. Be sure to make and go to all appointments, and call your doctor if you are having problems. It's also a good idea to know your test results and keep a list of the medicines you take. How can you care for yourself at home? · If possible, stop or reduce the activity that causes your symptoms. If you cannot stop the activity, take frequent breaks to rest and stretch or change hand positions to do a task. Try switching hands, such as when using a computer mouse. · Try to avoid bending or twisting your wrists. · Ask your doctor if you can take an over-the-counter pain medicine, such as acetaminophen (Tylenol), ibuprofen (Advil, Motrin), or naproxen (Aleve). Be safe with medicines. Read and follow all instructions on the label. · If your doctor prescribes corticosteroid medicine to help reduce pain and swelling, take it exactly as prescribed. Call your doctor if you think you are having a problem with your medicine. · Put ice or a cold pack on your wrist for 10 to 20 minutes at a time to ease pain.  Put a thin cloth between the ice and your skin. · If your doctor or your physical or occupational therapist tells you to wear a wrist splint, wear it as directed to keep your wrist in a neutral position. This also eases pressure on your median nerve. · Ask your doctor whether you should have physical or occupational therapy to learn how to do tasks differently. · Try a yoga class to stretch your muscles and build strength in your hands and wrists. Yoga has been shown to ease carpal tunnel symptoms. To prevent carpal tunnel  · When working at a computer, keep your hands and wrists in line with your forearms. Hold your elbows close to your sides. Take a break every 10 to 15 minutes. · Try these exercises:  ? Warm up: Rotate your wrist up, down, and from side to side. Repeat this 4 times. Stretch your fingers far apart, relax them, then stretch them again. Repeat 4 times. Stretch your thumb by pulling it back gently, holding it, and then releasing it. Repeat 4 times. ? Prayer stretch: Start with your palms together in front of your chest just below your chin. Slowly lower your hands toward your waistline while keeping your hands close to your stomach and your palms together until you feel a mild to moderate stretch under your forearms. Hold for 10 to 20 seconds. Repeat 4 times. ? Wrist flexor stretch: Hold your arm in front of you with your palm up. Bend your wrist, pointing your hand toward the floor. With your other hand, gently bend your wrist further until you feel a mild to moderate stretch in your forearm. Hold for 10 to 20 seconds. Repeat 4 times. ? Wrist extensor stretch: Repeat the steps for the wrist flexor stretch, but begin with your extended hand palm down. · Squeeze a rubber exercise ball several times a day to keep your hands and fingers strong. · Avoid holding objects (such as a book) in one position for a long time. When possible, use your whole hand to grasp an object.  Using just the thumb and index finger can put stress on the wrist.  · Do not smoke. It can make this condition worse by reducing blood flow to the median nerve. If you need help quitting, talk to your doctor about stop-smoking programs and medicines. These can increase your chances of quitting for good. When should you call for help? Watch closely for changes in your health, and be sure to contact your doctor if:    · Your pain or other problems do not get better with home care.     · You want more information about physical or occupational therapy.     · You have side effects of your corticosteroid medicine, such as:  ? Weight gain. ? Mood changes. ? Trouble sleeping. ? Bruising easily.     · You have any other problems with your medicine. Where can you learn more? Go to http://www.gray.com/  Enter R432 in the search box to learn more about \"Carpal Tunnel Syndrome: Care Instructions. \"  Current as of: July 1, 2021               Content Version: 13.0  © 2006-2021 [employer]. Care instructions adapted under license by [employer] (which disclaims liability or warranty for this information). If you have questions about a medical condition or this instruction, always ask your healthcare professional. [place] [address] any warranty or liability for your use of this information. Frozen Shoulder: Care Instructions  Your Care Instructions     Frozen shoulder is stiffness, pain, and trouble moving your shoulder. It may happen after an injury or overuse, or from a disease such as diabetes or a stroke. You may have pain that keeps you from using your shoulder. However, you need to move your shoulder. If you do not move it, it will get more stiff and sore. Your doctor may order an X-ray to make sure there is not another cause for your stiff shoulder. You can treat frozen shoulder with heat, stretching, over-the-counter pain medicines, and physical therapy.  Your doctor also may inject medicine into your shoulder to reduce pain and swelling. It can take a year or more to get better. Surgery is almost never needed. Follow-up care is a key part of your treatment and safety. Be sure to make and go to all appointments, and call your doctor if you are having problems. It's also a good idea to know your test results and keep a list of the medicines you take. How can you care for yourself at home? · Take pain medicines exactly as directed. ? If the doctor gave you a prescription medicine for pain, take it as prescribed. ? If you are not taking a prescription pain medicine, ask your doctor if you can take an over-the-counter medicine. · Put a heating pad set on low or a warm, wet towel wrapped in plastic on your shoulder. The heat may make it easier to stretch your shoulder. · Follow your doctor's advice for stretches and exercises. · Go to physical therapy if your doctor suggests it. · Try these stretching exercises to reduce stiffness if your doctor says it is okay. Do the exercises slowly to avoid injury. Put a warm, wet towel on your shoulder before exercising. Stop any exercise that increases pain. ? Pendulum exercise. While leaning forward and holding onto a table or the back of a chair with your good arm, bend at the waist, allowing your affected arm to hang straight down. Swing the affected arm back and forth like a pendulum, then in circles that start small and gradually grow larger as pain allows. Try this for about 2 or 3 minutes, several times a day. Once pain begins to go away, you can do this exercise while holding a 1- or 2-pound weight. ? Wall climbing (to the side). Stand with your side to a wall so that your fingers can just touch it. Then turn so your body is turned slightly toward the wall. Walk the fingers of your injured arm up the wall as high as pain permits. Hold that position for a count of 15 to 30 seconds.  Walk your fingers down to the starting position. Repeat 2 to 4 times, trying to reach higher each time. ? Wall climbing (to the front). Face a wall, standing so your fingers can just touch it. Walk the fingers of your affected arm up the wall as high as pain permits. Hold that position for a count of 15 to 30 seconds. Slowly walk your fingers to the starting position. Repeat 2 to 4 times, trying to reach higher each time. When should you call for help? Call your doctor now or seek immediate medical care if:    · You have severe pain.     · Your arm is cool or pale or changes color.     · You have tingling or numbness in your arm. Watch closely for changes in your health, and be sure to contact your doctor if:    · You have increased pain.     · You have new pain that develops in another area. For example, you have pain in your arm, hand, or elbow.     · You do not get better as expected. Where can you learn more? Go to http://www.gray.com/  Enter S580 in the search box to learn more about \"Frozen Shoulder: Care Instructions. \"  Current as of: July 1, 2021               Content Version: 13.0  © 6071-1184 [employer]. Care instructions adapted under license by [employer] (which disclaims liability or warranty for this information). If you have questions about a medical condition or this instruction, always ask your healthcare professional. [place] [address] any warranty or liability for your use of this information. Knee Arthritis: Care Instructions  Your Care Instructions     Knee arthritis is a breakdown of the cartilage that cushions your knee joint. When the cartilage wears down, your bones rub against each other. This causes pain and stiffness. Knee arthritis tends to get worse with time. Treatment for knee arthritis involves reducing pain, making the leg muscles stronger, and staying at a healthy body weight.  The treatment usually does not improve the health of the cartilage, but it can reduce pain and improve how well your knee works. You can take simple measures to protect your knee joints, ease your pain, and help you stay active. Follow-up care is a key part of your treatment and safety. Be sure to make and go to all appointments, and call your doctor if you are having problems. It's also a good idea to know your test results and keep a list of the medicines you take. How can you care for yourself at home? · Know that knee arthritis will cause more pain on some days than on others. · Stay at a healthy weight. Lose weight if you are overweight. When you stand up, the pressure on your knees from every pound of body weight is multiplied four times. So if you lose 10 pounds, you will reduce the pressure on your knees by 40 pounds. · Talk to your doctor or physical therapist about exercises that will help ease joint pain. ? Stretch to help prevent stiffness and to prevent injury before you exercise. You may enjoy gentle forms of yoga to help keep your knee joints and muscles flexible. ? Walk instead of jog.  ? Ride a bike. This makes your thigh muscles stronger and takes pressure off your knee. ? Wear well-fitting and comfortable shoes. ? Exercise in chest-deep water. This can help you exercise longer with less pain. ? Avoid exercises that include squatting or kneeling. They can put a lot of strain on your knees. ? Talk to your doctor to make sure that the exercise you do is not making the arthritis worse. · Do not sit for long periods of time. Try to walk once in a while to keep your knee from getting stiff. · Ask your doctor or physical therapist whether shoe inserts may reduce your arthritis pain. · If you can afford it, get new athletic shoes at least every year. This can help reduce the strain on your knees. · Use a device to help you do everyday activities. ? A cane or walking stick can help you keep your balance when you walk.  Hold the cane or walking stick in the hand opposite the painful knee. ? If you feel like you may fall when you walk, try using crutches or a front-wheeled walker. These can prevent falls that could cause more damage to your knee. ? A knee brace may help keep your knee stable and prevent pain. ? You also can use other things to make life easier, such as a higher toilet seat and handrails in the bathtub or shower. · Take pain medicines exactly as directed. ? Do not wait until you are in severe pain. You will get better results if you take it sooner. ? If you are not taking a prescription pain medicine, take an over-the-counter medicine such as acetaminophen (Tylenol), ibuprofen (Advil, Motrin), or naproxen (Aleve). Read and follow all instructions on the label. ? Do not take two or more pain medicines at the same time unless the doctor told you to. Many pain medicines have acetaminophen, which is Tylenol. Too much acetaminophen (Tylenol) can be harmful. ? Tell your doctor if you take a blood thinner, have diabetes, or have allergies to shellfish. · Ask your doctor if you might benefit from a shot of steroid medicine into your knee. This may provide pain relief for several months. · Many people take the supplements glucosamine and chondroitin for osteoarthritis. Some people feel they help, but the medical research does not show that they work. Talk to your doctor before you take these supplements. When should you call for help? Call your doctor now or seek immediate medical care if:    · You have sudden swelling, warmth, or pain in your knee.     · You have knee pain and a fever or rash.     · You have such bad pain that you cannot use your knee. Watch closely for changes in your health, and be sure to contact your doctor if you have any problems. Where can you learn more? Go to http://www.gray.com/  Enter W187 in the search box to learn more about \"Knee Arthritis: Care Instructions. \"  Current as of: April 30, 2021               Content Version: 13.0  © 7366-2864 [employer], [facility]. Care instructions adapted under license by [employer] (which disclaims liability or warranty for this information). If you have questions about a medical condition or this instruction, always ask your healthcare professional. [place] [address] any warranty or liability for your use of this information.

## 2022-03-02 ENCOUNTER — OFFICE VISIT (OUTPATIENT)
Dept: SURGERY | Age: 42
End: 2022-03-02
Payer: MEDICAID

## 2022-03-02 VITALS
HEART RATE: 110 BPM | BODY MASS INDEX: 39.44 KG/M2 | DIASTOLIC BLOOD PRESSURE: 78 MMHG | SYSTOLIC BLOOD PRESSURE: 114 MMHG | WEIGHT: 182.8 LBS | TEMPERATURE: 98.8 F | RESPIRATION RATE: 18 BRPM | HEIGHT: 57 IN

## 2022-03-02 DIAGNOSIS — E66.01 SEVERE OBESITY (BMI 35.0-35.9 WITH COMORBIDITY) (HCC): ICD-10-CM

## 2022-03-02 DIAGNOSIS — K21.9 GASTROESOPHAGEAL REFLUX DISEASE, UNSPECIFIED WHETHER ESOPHAGITIS PRESENT: Primary | ICD-10-CM

## 2022-03-02 PROCEDURE — 99204 OFFICE O/P NEW MOD 45 MIN: CPT | Performed by: SURGERY

## 2022-03-02 NOTE — PROGRESS NOTES
1. Have you been to the ER, urgent care clinic since your last visit? Hospitalized since your last visit? Yes    2. Have you seen or consulted any other health care providers outside of the [address] [place] [address] since your last visit? Include any pap smears or colon screening. Yes      Neck:14in  Waist:43.5lb  Hips:47in

## 2022-03-02 NOTE — LETTER
3/2/2022    Patient: Jenna Amaya   YOB: 1980   Date of Visit: 3/2/2022     Farhan Whitney NP  [place] [address]  Via Fax: [phone]    Dear Farhan Whitney NP,      Thank you for referring Ms. Angelica Paz to [facility] for evaluation. My notes for this consultation are attached. If you have questions, please do not hesitate to call me. I look forward to following your patient along with you.       Sincerely,    Jimmy Gomez MD

## 2022-03-02 NOTE — PROGRESS NOTES
Bariatric Surgery Consult    Mahnaz Lai is a 46 y.o. female with a history of morbid obesity. Her Height: 4' 9\" (144.8 cm), Weight: 182 lb 12.8 oz (82.9 kg). Body mass index is 39.56 kg/m². She reports that she has been trying to lose weight for 5 years. Her maximum weight was 182 pounds. She has attended our bariatric surgery information seminar. Christi Lala wants to consider laparoscopic gastric bypass surgery. Pt is referred by: [facility] Diana NP. Dietary History:   The patient says that in the past, physician supervised, unsupervised diets and Weight Watchers have not resulted in real success. When asked why she was not able to achieve or maintain significant weight loss she replied, \" she has been able to lose about 20 pounds with other weight loss attempts but has not been able to keep it off long-term\". Number of meals per day: 3  Portion size: medium  Snacks: She is often snacking at home lunchtime snacking on sweets     Comorbidities:     Bariatric comorbidities present: hypertension, insulin dependent diabetes, GERD and obstructive sleep apnea    Ambulatory status: independent    The patient's reported level of exercise: moderately active.       Patient Active Problem List    Diagnosis Date Noted    Uncontrolled diabetes mellitus ([address] 2017    Diabetes in pregnancy 2017    Pregnant 2017    Microalbuminuria 2016    Mixed hyperlipidemia 2015    BMI 34.0-34.9,adult 2015    Medically noncompliant 2015    Labor and delivery complicated by fetal stress 2014    H/O:  2014    DM (diabetes mellitus), gestational 2014    Diabetes mellitus complicating pregnancy, antepartum 2014    Obesity 2013    Hyperlipidemia 2013    Type II diabetes mellitus, uncontrolled ([address] 2013     Past Medical History:   Diagnosis Date    Diabetes ([address]     Diabetes ([address]     type 2    Diabetes mellitus ([address] 2011    insulin    Hyperlipidemia     Obesity       Past Surgical History:   Procedure Laterality Date    HX  SECTION  2011    breech    HX GYN      C Section x 3    MD  DELIVERY ONLY      2011      Social History     Tobacco Use    Smoking status: Never Smoker    Smokeless tobacco: Never Used   Substance Use Topics    Alcohol use: Never      Family History   Problem Relation Age of Onset    Diabetes Mother     Diabetes Father     Diabetes Sister     Diabetes Brother     Diabetes Sister     Diabetes Brother       . Current Outpatient Medications   Medication Sig    liraglutide (VICTOZA) 0.6 mg/0.1 mL (18 mg/3 mL) pnij 1.2 mg by SubCUTAneous route.  BD Ultra-Fine Short Pen Needle 31 gauge x 5/16\" ndle     insulin lispro (HumaLOG KwikPen Insulin) 100 unit/mL kwikpen Inject 13 units with breakfast and 30 units with lunch    atorvastatin (LIPITOR) 20 mg tablet Take 20 mg by mouth daily.  insulin glargine (LANTUS SOLOSTAR U-100 INSULIN) 100 unit/mL (3 mL) inpn Inject 70 units at bedtime (Patient taking differently: Inject 50 units twice a day)    metFORMIN ER (GLUCOPHAGE XR) 500 mg tablet Take 2 Tabs by mouth Before breakfast and dinner.  glucose blood VI test strips (ONETOUCH VERIO) strip Use to test blood glucose 5x daily    Insulin Needles, Disposable, (YOUNG PEN NEEDLE) 32 gauge x 5/32\" ndle Use with insulin pens to inject up to 4 times daily    Blood-Glucose Meter (ONETOUCH VERIO FLEX) misc Use to test blood sugar 8 times daily    Trulicity 9.72 XJ/2.5 mL sub-q pen 0.75 mg by SubCUTAneous route every seven (7) days. (Patient not taking: Reported on 3/2/2022)    canagliflozin (Invokana) 100 mg tablet Take 1 Tablet by mouth Daily (before breakfast). (Patient not taking: Reported on 3/2/2022)    diclofenac (VOLTAREN) 1 % gel Apply 2 g to affected area four (4) times daily.  (Patient not taking: Reported on 3/2/2022)    ibuprofen (MOTRIN) 600 mg tablet Take 1 Tab by mouth every six (6) hours as needed for Pain. (Patient not taking: Reported on 11/10/2021)    lidocaine 4 % patch Apply patch to the area of pain for 12 hours, then remove for 12 hours. (Patient not taking: Reported on 3/2/2022)    lisinopril (PRINIVIL, ZESTRIL) 5 mg tablet Take 1 Tab by mouth daily. (Patient not taking: Reported on 11/10/2021)     No current facility-administered medications for this visit.       Allergies   Allergen Reactions    Exenatide Microspheres Nausea and Vomiting    Victoza [Liraglutide] Swelling         Review of Systems:    Constitutional: negative  Ears, Nose, Mouth, Throat, and Face: negative  Respiratory: negative  Cardiovascular: negative  Gastrointestinal: positive for reflux symptoms  Genitourinary:negative  Integument/Breast: negative  Hematologic/Lymphatic: negative  Musculoskeletal:negative  Neurological: negative  Behavioral/Psychiatric: negative  Endocrine: negative    Objective:     Visit Vitals  /78 (BP 1 Location: Left arm, BP Patient Position: Sitting, BP Cuff Size: Adult)   Pulse (!) 110 Comment: was walking a distance no c/o distress   Temp 98.8 °F (37.1 °C) (Oral)   Resp 18   Ht 4' 9\" (1.448 m)   Wt 182 lb 12.8 oz (82.9 kg)   BMI 39.56 kg/m²        Physical Exam:    General:  alert, no distress, morbidly obese   Eyes:  conjunctivae and sclerae normal, pupils equal, round, reactive to light, extraocular movements intact without nystagmus   Throat & Neck: no erythema or exudates noted and neck supple and symmetrical; no palpable masses   Lungs:   clear to auscultation bilaterally   Heart:  Regular rate and rhythm   Abdomen:   obese, soft, nontender, nondistended, no masses or organomegaly,    Extremities: no edema,  no gait disturbances   Skin: Normal.       Findings:   Esophagus:normal  Stomach: erosive gastritis in antrum, biopsies taken   Duodenum/jejunum: normal, random biopsies taken         Therapies:  none     Specimens: as above         EBL: None Complications:   None; patient tolerated the procedure well. Impression:    -See post-procedure diagnoses.     Recommendations:  -Continue acid suppression with protonix for 6 months  -avoid NSAIDS  -awaiting US of abdomen  -f/u in 2 months     Signed By: Tristan Montiel MD      7/15/2021  6:02 PM            Assessment:     1. Morbid obesity (Body mass index is 39.56 kg/m².) with multiple comorbidities. The patient meets criteria established by the NIH for weight loss surgery candidates. Without weight reduction, co-morbidities will escalate as well as increase risk of early mortality. Our recommendation is the patient could be served with laparoscopic gastric bypass surgery. I explained to the patient differences between laparoscopic gastric bypass, laparoscopic adjustable gastric banding, and laparoscopic vertical sleeve gastrectomy with respect to expected weight loss, resolution of comorbidities and risks. Ms. Judah Stone has attended one our informational meetings and has seen our educational materials. She has requested Dr. Ember Conley to perform her procedure. I reviewed the role for this procedure as a tool to help her achieve her weight loss goals. I reminded her that effective weight loss comes from lifelong adherence to changes in dietary choices, eating habits and exercise. Recommendation: We will request approval for laparoscopic gastric bypass surgery. We recommend that the patient undergo the following evaluations prior to considering surgery:    Cardiology: no  Dietician: yes  Gastroenterology: no  Psychiatry/Psychology: yes  Pulmonology: no  Sleep Medicine: no    She appears to be a good candidate for gastric bypass. She does have severe diabetes and some mild GERD. I will set her up with an upper GI swallow study to evaluate her stomach prior to surgery.   She will start her nutrition visits and do a psychological evaluation    Signed By: Jennifer Muniz MD     March 2, 2022 Greater than half of the time: 45 minutes was used in counciling the patient about bariatric surgery and the steps she needs to take to move forward with her surgery. Ms. Ginger Terry has a reminder for a \"due or due soon\" health maintenance. I have asked that she contact her primary care provider for follow-up on this health maintenance.

## 2022-03-16 ENCOUNTER — CLINICAL SUPPORT (OUTPATIENT)
Dept: SURGERY | Age: 42
End: 2022-03-16

## 2022-03-16 DIAGNOSIS — E66.01 MORBID OBESITY (HCC): Primary | ICD-10-CM

## 2022-03-16 NOTE — PROGRESS NOTES
Pre-operative Bariatric Nutrition Evaluation    Date: 3/16/2022              Session 1 of 3 (program requirement)    Insurance: [employer]           Physician/Surgeon: Dr. Cirilo Walsh      Name: Efrem Mohr  :  1980  Age:  46  Gender: Female  Type of Surgery: [x]   Gastric Bypass   []    Sleeve Gastrectomy    ASSESSMENT:    Past Medical History: DM, GERD    Medications/Supplements:   Prior to Admission medications    Medication Sig Start Date End Date Taking? Authorizing Provider   liraglutide (VICTOZA) 0.6 mg/0.1 mL (18 mg/3 mL) pnij 1.2 mg by SubCUTAneous route. Provider, Historical   BD Ultra-Fine Short Pen Needle 31 gauge x /16\" ndle  10/3/21   Provider, Historical   insulin lispro (HumaLOG KwikPen Insulin) 100 unit/mL kwikpen Inject 13 units with breakfast and 30 units with lunch    Provider, Historical   Trulicity 6.97 QP/0.3 mL sub-q pen 0.75 mg by SubCUTAneous route every seven (7) days. Patient not taking: Reported on 3/2/2022 11/5/21   Provider, Historical   atorvastatin (LIPITOR) 20 mg tablet Take 20 mg by mouth daily. 21   Provider, Historical   canagliflozin (Invokana) 100 mg tablet Take 1 Tablet by mouth Daily (before breakfast). Patient not taking: Reported on 3/2/2022 11/10/21   Briana Sullivan MD   diclofenac (VOLTAREN) 1 % gel Apply 2 g to affected area four (4) times daily. Patient not taking: Reported on 3/2/2022 11/10/21   Briana Sullivan MD   ibuprofen (MOTRIN) 600 mg tablet Take 1 Tab by mouth every six (6) hours as needed for Pain. Patient not taking: Reported on 11/10/2021 1/8/21   HENRIK Strickland   lidocaine 4 % patch Apply patch to the area of pain for 12 hours, then remove for 12 hours.   Patient not taking: Reported on 3/2/2022 1/8/21   Monse Strickland   insulin glargine (LANTUS SOLOSTAR U-100 INSULIN) 100 unit/mL (3 mL) inpn Inject 70 units at bedtime  Patient taking differently: Inject 50 units twice a day 18   Vivek Forrester MD metFORMIN ER (GLUCOPHAGE XR) 500 mg tablet Take 2 Tabs by mouth Before breakfast and dinner. 4/18/18   Meredith West MD   glucose blood VI test strips [facility]) strip Use to test blood glucose 5x daily 12/5/17   Meredith West MD   Insulin Needles, Disposable, (YOUNG PEN NEEDLE) 32 gauge x 5/32\" ndle Use with insulin pens to inject up to 4 times daily 12/5/17   Meredith West MD   lisinopril (PRINIVIL, ZESTRIL) 5 mg tablet Take 1 Tab by mouth daily. Patient not taking: Reported on 11/10/2021 9/26/17   Mreedith West MD   Blood-Glucose Meter [facility] FLEX) misc Use to test blood sugar 8 times daily 7/25/17   Carolynn Elam MD         Smoking: None  Alcohol: None    Food Allergies/Intolerances: None    Anthropometrics:    Ht: 57 inches   Wt: 182 lbs (3/2)    IBW: 95 lbs   %IBW: 191     BMI: 39   Category: Obesity class I    Reported wt history: Pt presents today for pre-op nutrition evaluation for wt loss surgery. Reports lowest adult BW of unsure and highest adult BW of 200 lbs. Attributes wt gain over the years r/t DM, eating habits . Has attempted wt loss through various methods with most successful wt loss of none . Has been unable to maintain long term or significant wt loss and is now seeking approval for weight loss surgery. Pt will need to complete 3 months of supervised weight loss for program requirements; 3 months of visits with PCP, followed by 3 visits with surgeon per insurance requirements. Exercise/Physical Activity: None    Reported Diet History: diabetic diet    24 Hour Diet Recall  Breakfast  Eggs, 2 bread, salad   Snacks     Lunch  Rice, veggies, chicken   Snacks  chips   Dinner  Same as bfast or skips   Snacks     Beverages  Tea- w/sugar, diet coke   Emotional eating when upset    Environment/Psychosocial/Support:  supportive of surgery; 4 friends who have had bariatric surgery.       NUTRITION DIAGNOSIS:  Food and nutrition related knowledge deficit r/t lack of prior exposure to information evidenced by pt demonstrates need for nutrition education for gastric bypass. NUTRITION INTERVENTION:  Pt educated on nutrition recommendations for weight loss surgery, specifically gastric bypass. Instructed on consuming 3 meals per day starting now. Use the balanced plate method to plan meals, include 3 oz of lean source of protein, 1/2 cup whole grains, unlimited non-starchy vegetables, 1/2 cup fruit and 1 serving of low fat dairy. Utilize handouts listing healthy snack and meal ideas to limit restaurant meals. After surgery measure all meals to 1/2 cup. Each meal will contain a 1/4 cup lean protein and 1/4 cup fruit, non-starchy vegetable or starch (limiting to once per day). Aim for 60 g protein per day. Sip on 48-64 oz of sugar free, calorie free, non-carbonated beverages each day. Do not use a straw. Do not consume beverages 30 minutes before, during or 30 minutes after meals. Read all nutrition labels. Demonstrated and emphasized identifying serving size, total fat, sugar and protein content. Defined low fat as </= 3 g per serving. Discussed lean and extra lean sources of protein. Provided list of low fat cooking methods. Avoid foods with sugar listed in the first 3 ingredients and >/15 g sugar per serving. Excess sugar/fat intake may lead to dumping syndrome. Discussed signs and symptoms of dumping syndrome. Practice mindful eating habits; take small bites, chew thoroughly, avoid distractions, utilize hunger/fullness scale. Consume meals over 20-30 minutes. Attend Bariatric Support Group and increase physical activity (approved per MD) for long term weight maintenance. NUTRITION MONITORING AND EVALUATION:    The following goals were established with patient;  1. Eat 3 meals a day-no skipping  2. Sub in non-food activities for emotional eating  3. Review nutrition education materials.  Follow up next month for continue nutrition education      Specific tips and techniques to facilitate compliance with above recommendations were provided and discussed. Nutrition evaluation reveals important lifestyle changes are indicated. Goals set and recommendations made. Will continue to assess. If further details are desired please feel free to contact me at [phone]. This phone number was also provided to the patient for any further questions or concerns.            Kris Palumbo RD

## 2022-04-13 ENCOUNTER — CLINICAL SUPPORT (OUTPATIENT)
Dept: SURGERY | Age: 42
End: 2022-04-13

## 2022-04-13 DIAGNOSIS — E66.01 MORBID OBESITY (HCC): Primary | ICD-10-CM

## 2022-04-13 NOTE — PROGRESS NOTES
[employer] [facility]  Supervised Weight Loss     Date:   2022    Patient's Name: Mark Freitas  : 1980    Insurance:  [employer]          Session: 2 of  3 (program requirement)  Surgery: Gastric bypass  Surgeon: Dr. Amy Crump    Height: 57 nches Weight: 178    Lbs (RD scale). BMI: 38   Pounds Lost since last month: 4 lbs              Pounds Gained since last month: 0    Starting Weight: 182 lbs   Previous Months Weight: 182 lbs  Overall Pounds Lost: 4 lbs Overall Pounds Gained: 0    Other Pertinent Information: Today's appointment was completed in the office.         Smoking Status:  None  Alcohol Intake: None    I have reviewed with pt the guidelines of the supervised wt loss program.  Pt understands the expectations of some wt loss during the program and that wt gain could delay the process. I have also explained that appointments need to be consecutive and missing an appointment may result in starting over. Pt has received this information in writing. Changes that patient has made since last month include:  Eliminated diet soda, no emotional eating. Eating Habits and Behaviors  General healthy eating guidelines were also discussed. Pts were instructed that their plate should be made up 1/2 plate coming from non-starchy vegetables, 1/4 coming from lean meat, and 1/4 of their plate coming from carbohydrates, including fruits, starches, or milk. We discussed measuring meals to 1/2 cup total per meal after surgery. Drinking only calorie-free, sugar-free and non-carbonated beverages. We discussed the importance of drinking 64 ounces of fluid per day to prevent dehydration post-operatively. Patient's current diet habits include: Pt is eating 2-3 meals per day. Snack choices include cookies. Pt is eating refined carbohydrate foods (bread, pasta, rice, potatoes) daily. Pt is eating sweets/desserts occasionally. . Pt is eating meals prepared outside of the home 1x week. Pt is drinking water, tea w/sugar. Pt reports no emotional eating. Physical Activity/Exercise  An exercise presentation was provided including information about exercise programs available both before and after surgery. We talked about the importance of increasing daily physical activity and beginning to develop an exercise regimen/routine. We talked about exercise as being an important part of long term weight loss after surgery. Comments:  During class, I discussed with patient the importance of getting into an exercise routine. Pt is currently walking daily for 15 min for activity. Pt has been encouraged to continue with current exercise. Behavior Modification       We talked about how to eat more mindfully. Tips and recommendations for how to make these changes were provided. Pt was encouraged to keep a food journal and record what they were taking in daily. Reviewed food labels    Overall Assessment:  Nutrition evaluation reveals small lifestyle changes are being made. Goals set and recommendations made. Will continue to assess    Patient-Set Goals:   1. Nutrition - reduce sugar in tea to 1 teaspoon; eat 3 meals a day  2. Exercise - continue with current exercise  3.  Behavior -start taking MVI; read food labels for added sugar and fat    Adonay Manzano, RD  4/13/2022

## 2022-05-18 ENCOUNTER — CLINICAL SUPPORT (OUTPATIENT)
Dept: SURGERY | Age: 42
End: 2022-05-18

## 2022-05-18 DIAGNOSIS — E66.01 MORBID OBESITY (HCC): Primary | ICD-10-CM

## 2022-05-18 NOTE — PROGRESS NOTES
[employer] [facility]  Supervised Weight Loss     Date:   2022    Patient's Name: Herminia Daniels  : 1980    Insurance:  [employer]          Session: 3 of  3 (program requirement)  Surgery: Gastric bypass                  Surgeon: Dr. Leidy Fisher     Height: 57 nches        Weight: 179    Lbs (RD scale). BMI: 38             Pounds Lost since last month: 0             Pounds Gained since last month: 1 lb     Starting Weight: 182 lbs                   Previous Months Weight: 178 lbs  Overall Pounds Lost: 3 lbs   Overall Pounds Gained: 0     Other Pertinent Information: Today's appointment was completed in the office. Pt needs to complete 3 visits with surgeon. Smoking Status:  None  Alcohol Intake: None    I have reviewed with pt the guidelines of the supervised wt loss program.  Pt understands the expectations of some wt loss during the program and that wt gain could delay the process. I have also explained that appointments need to be consecutive and missing an appointment may result in starting over. Pt has received this information in writing. Changes that patient has made since last month include: None reported. Eating Habits and Behaviors  A nutrition lesson was presented on label reading with specific guidelines provided for limiting added sugars. This information will help increase healthy food choices, promote weight loss and prevent dumping syndrome after gastric bypass. We also reviewed the general nutrition guidelines for bariatric surgery. Patient's current diet habits include: Pt is eating 2-3 meals per day. Snack choices include chips. Pt is eating refined carbohydrate foods (bread, pasta, rice, potatoes) daily. Pt is eating sweets/desserts occasionally. Pt is using baking, grilling, broiling and frying cooking methods. Pt is not eating meals prepared outside of the home.   Pt is drinking water, hot tea with sugar, diet soda 1x week. Pt reports no emotional eating. Physical Activity/Exercise  We talked about the importance of increasing daily physical activity and beginning to develop an exercise regimen/routine. We talked about exercise as being an important part of long term weight loss after surgery. Comments:  During class, I discussed with patient the importance of getting into an exercise routine. Pt is currently walking daily for 15 min for activity. Pt has been encouraged to increase duration of exercise. Behavior Modification       We talked about how to eat more mindfully. Tips and recommendations for how to make these changes were provided. Pt was encouraged to keep a food journal and record what they were taking in daily. Overall Assessment: Nutrition evaluation reveal more lifestyle changes need to be made and further diet education. Goals set and recommendations made. Will continue to assess.       Patient-Set Goals:   1. Nutrition - eat 3 meals a day- no skipping  2. Exercise - continue with current exercise- increase duration of exercise  3.  Behavior - start taking daily MVI; find acceptable protein shake from list; practice drinking rule    Estrella Ortiz, MEENA  5/18/2022

## 2022-05-25 NOTE — ED NOTES
RN reviewed discharge instructions with patient. Patient verbalized understanding. Time allotted for questions. A&O at time of discharge. VSS. Patient ambulatory off unit with crutches. None

## 2022-06-17 ENCOUNTER — CLINICAL SUPPORT (OUTPATIENT)
Dept: SURGERY | Age: 42
End: 2022-06-17

## 2022-06-17 DIAGNOSIS — E66.01 MORBID OBESITY (HCC): Primary | ICD-10-CM

## 2022-06-17 NOTE — PROGRESS NOTES
[facility] at [facility]  Supervised Weight Loss     Date:   2022    Patient's Name: Brooklyn Crooks  : 1980    Insurance:  [employer]          Session: 3 HC  7 (program requirement)  Surgery: Gastric bypass                  Surgeon: Dr. Vic Mejía: 410    HPG (last months wt).                                  BMI: 81             Pounds Lost since last month: 0             Pounds Gained since last month: 0     Starting Weight: 182 lbs                   Previous Months Weight: 178 lbs  Overall Pounds Lost: 3 lbs   Overall Pounds Gained: 0     Other Pertinent Information: Today's appointment was completed in the office.  Pt needs to complete 3 visits with surgeon. Smoking Status:  None  Alcohol Intake: None    I have reviewed with pt the guidelines of the supervised wt loss program.  Pt understands the expectations of some wt loss during the program and that wt gain could delay the process. I have also explained that classes need to be consecutive. Missing a class may result in starting over. Pt has received this information in writing. Changes that patient has made since last month include: started MVI, practiced drinking rule      Eating Habits and Behaviors  General healthy eating guidelines were discussed. A nutrition lesson specific to the importance of protein intake after surgery was provided. We discussed food sources of protein, protein supplements and multiple reasons as to why protein is important after bariatric surgery. Pts were instructed to focus on including protein at every meal and practice eating protein first at the meal. Pts were encouraged to sample a protein shake for tolerance. Patients were also instructed to use the balanced plate method for help with portion control and general healthy eating prior to surgery. We discussed measuring meals to 1/2 cup total per meal after surgery.  Drinking only calorie-free, sugar-free and non-carbonated beverages. We discussed the importance of drinking 64 ounces of fluid per day to prevent dehydration post-operatively. Patient's current diet habits include: Pt is eating 2 meals per day- skips dinner (full due to Victoza) B: one egg, beans or salad; L: rice, veggies, chix. Snack choices include cookies, chips. Pt is eating refined carbohydrate foods (bread, pasta, rice, potatoes) occasionally. Pt is eating sweets/desserts frequently. Pt is using baking, grilling, broiling and frying cooking methods. Pt is not eating meals prepared outside of the home. Pt is drinking water, tea-sugar. Pt reports no emotional eating. Physical Activity/Exercise  We talked about the importance of increasing daily physical activity and beginning to develop an exercise regimen/routine. We talked about exercise as being an important part of long term weight loss after surgery. Comments:  During class, I discussed with patient the importance of getting into an exercise routine. Pt is currently walking 30 min daily for activity. Pt has been encouraged to continue with current exercise. Behavior Modification       We talked about how to eat more mindfully. Tips and recommendations for how to make these changes were provided. Pt was encouraged to keep a food journal and record what they were taking in daily. Reviewed food labels    Overall Assessment: Nutrition evaluation reveal more lifestyle changes need to be made and further diet education. Goals set and recommendations made. Will continue to assess. Patient-Set Goals:   1. Nutrition - have 3 meals a day- add in dinner (protein shake)  2. Exercise - continue with current exercise  3.  Behavior -try protein shake from list (dislikes Ensure)    Adonay Manzano, RD  6/17/2022

## 2022-06-21 ENCOUNTER — OFFICE VISIT (OUTPATIENT)
Dept: SURGERY | Age: 42
End: 2022-06-21
Payer: MEDICAID

## 2022-06-21 VITALS
SYSTOLIC BLOOD PRESSURE: 101 MMHG | WEIGHT: 176 LBS | DIASTOLIC BLOOD PRESSURE: 64 MMHG | HEART RATE: 98 BPM | TEMPERATURE: 98.8 F | HEIGHT: 57 IN | BODY MASS INDEX: 37.97 KG/M2 | RESPIRATION RATE: 18 BRPM | OXYGEN SATURATION: 95 %

## 2022-06-21 DIAGNOSIS — K21.9 GASTROESOPHAGEAL REFLUX DISEASE, UNSPECIFIED WHETHER ESOPHAGITIS PRESENT: ICD-10-CM

## 2022-06-21 DIAGNOSIS — E66.01 SEVERE OBESITY (BMI 35.0-35.9 WITH COMORBIDITY) (HCC): Primary | ICD-10-CM

## 2022-06-21 PROCEDURE — 99214 OFFICE O/P EST MOD 30 MIN: CPT | Performed by: SURGERY

## 2022-06-21 NOTE — PROGRESS NOTES
1. Have you been to the ER, urgent care clinic since your last visit? Hospitalized since your last visit? No    2. Have you seen or consulted any other health care providers outside of the [address] [place] [address] since your last visit? Include any pap smears or colon screening.  No

## 2022-06-21 NOTE — PROGRESS NOTES
Bariatric Surgery Consult 1 of 3    Dave Callejas is a 46 y.o. female with a history of morbid obesity. Her Height: 4' 9\" (144.8 cm), Weight: 176 lb (79.8 kg). Body mass index is 38.09 kg/m². She reports that she has been trying to lose weight for 5 years. Her maximum weight was 280 pounds. She has attended our bariatric surgery information seminar. Delano Higgins wants to consider laparoscopic gastric bypass surgery. Pt is referred by: [facility] San[facility] Trejo NP. Comorbidities:     Bariatric comorbidities present: hypertension, insulin dependent diabetes, GERD, chronic back problems, osteoarthritis and obstructive sleep apnea    Ambulatory status: independent    The patient's reported level of exercise: moderately active.       Patient Active Problem List    Diagnosis Date Noted    Uncontrolled diabetes mellitus 2017    Diabetes in pregnancy 2017    Pregnant 2017    Microalbuminuria 2016    Mixed hyperlipidemia 2015    BMI 34.0-34.9,adult 2015    Medically noncompliant 2015    Labor and delivery complicated by fetal stress 2014    H/O:  2014    DM (diabetes mellitus), gestational 2014    Diabetes mellitus complicating pregnancy, antepartum 2014    Obesity 2013    Hyperlipidemia 2013    Type II diabetes mellitus, uncontrolled 2013     Past Medical History:   Diagnosis Date    Diabetes ([address]     Diabetes ([address]     type 2    Diabetes mellitus ([address] 2011    insulin    Hyperlipidemia     Obesity       Past Surgical History:   Procedure Laterality Date    HX  SECTION  2011    breech    HX GYN      C Section x 3    MD  DELIVERY ONLY      2011      Social History     Tobacco Use    Smoking status: Never Smoker    Smokeless tobacco: Never Used   Substance Use Topics    Alcohol use: Never      Family History   Problem Relation Age of Onset    Diabetes Mother     Diabetes Father    Zen Clark Diabetes Sister     Diabetes Brother     Diabetes Sister     Diabetes Brother       . Current Outpatient Medications   Medication Sig    liraglutide (VICTOZA) 0.6 mg/0.1 mL (18 mg/3 mL) pnij 1.2 mg by SubCUTAneous route.  BD Ultra-Fine Short Pen Needle 31 gauge x 5/16\" ndle     insulin lispro (HumaLOG KwikPen Insulin) 100 unit/mL kwikpen Inject 13 units with breakfast and 30 units with lunch    atorvastatin (LIPITOR) 20 mg tablet Take 20 mg by mouth daily.  lidocaine 4 % patch Apply patch to the area of pain for 12 hours, then remove for 12 hours.  insulin glargine (LANTUS SOLOSTAR U-100 INSULIN) 100 unit/mL (3 mL) inpn Inject 70 units at bedtime (Patient taking differently: Inject 50 units twice a day)    metFORMIN ER (GLUCOPHAGE XR) 500 mg tablet Take 2 Tabs by mouth Before breakfast and dinner.  glucose blood VI test strips (ONETOUCH VERIO) strip Use to test blood glucose 5x daily    Insulin Needles, Disposable, (YOUNG PEN NEEDLE) 32 gauge x 5/32\" ndle Use with insulin pens to inject up to 4 times daily    Blood-Glucose Meter (ONETOUCH VERIO FLEX) misc Use to test blood sugar 8 times daily    Trulicity 6.21 ZT/0.4 mL sub-q pen 0.75 mg by SubCUTAneous route every seven (7) days. (Patient not taking: Reported on 3/2/2022)    canagliflozin (Invokana) 100 mg tablet Take 1 Tablet by mouth Daily (before breakfast). (Patient not taking: Reported on 3/2/2022)    diclofenac (VOLTAREN) 1 % gel Apply 2 g to affected area four (4) times daily. (Patient not taking: Reported on 3/2/2022)    ibuprofen (MOTRIN) 600 mg tablet Take 1 Tab by mouth every six (6) hours as needed for Pain. (Patient not taking: Reported on 11/10/2021)    lisinopril (PRINIVIL, ZESTRIL) 5 mg tablet Take 1 Tab by mouth daily. (Patient not taking: Reported on 6/21/2022)     No current facility-administered medications for this visit.       Allergies   Allergen Reactions    Exenatide Microspheres Nausea and Vomiting    Victoza [Liraglutide] Swelling         Review of Systems:    Constitutional: negative  Ears, Nose, Mouth, Throat, and Face: negative  Respiratory: negative  Cardiovascular: negative  Gastrointestinal: positive for reflux symptoms  Genitourinary:negative  Integument/Breast: negative  Hematologic/Lymphatic: negative  Musculoskeletal:positive for stiff joints and back pain  Neurological: negative  Behavioral/Psychiatric: negative  Endocrine: negative  Allergic/Immunologic: negative    Objective:     Visit Vitals  /64 (BP 1 Location: Left arm, BP Patient Position: Sitting, BP Cuff Size: Adult)   Pulse 98   Temp 98.8 °F (37.1 °C) (Oral)   Resp 18   Ht 4' 9\" (1.448 m)   Wt 176 lb (79.8 kg)   SpO2 95%   BMI 38.09 kg/m²        Physical Exam:    General:  alert, no distress, morbidly obese   Eyes:  conjunctivae and sclerae normal, pupils equal, round, reactive to light, extraocular movements intact without nystagmus   Throat & Neck: no erythema or exudates noted and neck supple and symmetrical; no palpable masses   Lungs:   clear to auscultation bilaterally   Heart:  Regular rate and rhythm   Abdomen:   obese, soft, nontender, nondistended, no masses or organomegaly,    Extremities: no edema,  no gait disturbances   Skin: Normal.         Findings:   Esophagus:normal  Stomach: erosive gastritis in antrum, biopsies taken   Duodenum/jejunum: normal, random biopsies taken         Therapies:  none     Specimens: as above         EBL: None      Complications:   None; patient tolerated the procedure well.           Impression:    -See post-procedure diagnoses.     Recommendations:  -Continue acid suppression with protonix for 6 months  -avoid NSAIDS  -awaiting US of abdomen  -f/u in 2 months     Signed By: Timmy Silver MD      7/15/2021  6:02 PM           Assessment:     1. Morbid obesity (Body mass index is 38.09 kg/m².) with multiple comorbidities.      The patient meets criteria established by the NIH for weight loss surgery candidates. Without weight reduction, co-morbidities will escalate as well as increase risk of early mortality. Our recommendation is the patient could be served with laparoscopic gastric bypass surgery. I explained to the patient differences between laparoscopic gastric bypass, laparoscopic adjustable gastric banding, and laparoscopic vertical sleeve gastrectomy with respect to expected weight loss, resolution of comorbidities and risks. Ms. Michael Licona has attended one our informational meetings and has seen our educational materials. She has requested Dr. Rizwan Lopez to perform her procedure. I reviewed the role for this procedure as a tool to help her achieve her weight loss goals. I reminded her that effective weight loss comes from lifelong adherence to changes in dietary choices, eating habits and exercise. Recommendation: We will request approval for laparoscopic gastric bypass surgery. She has now completed 1 of 3 visits with me. She is still seeing our nutritionist.  She had an EGD about a year ago which showed gastritis but otherwise normal findings. No upper GI or further endoscopy is needed. She will come back and see me in 1 month. Signed By: Kami Gonzáles MD     June 21, 2022       Greater than half of the time: 30 minutes was used in counciling the patient about bariatric surgery and the steps she needs to take to move forward with her surgery. Ms. Michael Licona has a reminder for a \"due or due soon\" health maintenance. I have asked that she contact her primary care provider for follow-up on this health maintenance.

## 2022-06-21 NOTE — LETTER
6/21/2022    Patient: Brooklyn Crooks   YOB: 1980   Date of Visit: 6/21/2022     [address] [facility] [address]  Via Fax: [phone]    Dear Janet Garrett NP,      Thank you for referring Ms. Angelica Paz to [address] for evaluation. My notes for this consultation are attached. If you have questions, please do not hesitate to call me. I look forward to following your patient along with you.       Sincerely,    Ricardo García MD

## 2022-07-17 ENCOUNTER — HOSPITAL ENCOUNTER (EMERGENCY)
Age: 42
Discharge: HOME | End: 2022-07-17
Attending: EMERGENCY MEDICINE
Payer: MEDICAID

## 2022-07-17 VITALS
BODY MASS INDEX: 37.86 KG/M2 | HEIGHT: 57 IN | HEART RATE: 98 BPM | WEIGHT: 175.49 LBS | RESPIRATION RATE: 16 BRPM | TEMPERATURE: 98 F | OXYGEN SATURATION: 100 % | SYSTOLIC BLOOD PRESSURE: 117 MMHG | DIASTOLIC BLOOD PRESSURE: 80 MMHG

## 2022-07-17 DIAGNOSIS — J06.9 ACUTE UPPER RESPIRATORY INFECTION: Primary | ICD-10-CM

## 2022-07-17 DIAGNOSIS — J01.00 ACUTE NON-RECURRENT MAXILLARY SINUSITIS: ICD-10-CM

## 2022-07-17 DIAGNOSIS — H65.02 NON-RECURRENT ACUTE SEROUS OTITIS MEDIA OF LEFT EAR: ICD-10-CM

## 2022-07-17 PROCEDURE — 74011250636 HC RX REV CODE- 250/636: Performed by: PHYSICIAN ASSISTANT

## 2022-07-17 PROCEDURE — 96372 THER/PROPH/DIAG INJ SC/IM: CPT

## 2022-07-17 PROCEDURE — 99284 EMERGENCY DEPT VISIT MOD MDM: CPT

## 2022-07-17 PROCEDURE — 74011250637 HC RX REV CODE- 250/637: Performed by: PHYSICIAN ASSISTANT

## 2022-07-17 PROCEDURE — U0005 INFEC AGEN DETEC AMPLI PROBE: HCPCS

## 2022-07-17 RX ORDER — IBUPROFEN 800 MG/1
800 TABLET ORAL
Qty: 20 TABLET | Refills: 0 | Status: SHIPPED | OUTPATIENT
Start: 2022-07-17 | End: 2022-07-24

## 2022-07-17 RX ORDER — AMOXICILLIN AND CLAVULANATE POTASSIUM 875; 125 MG/1; MG/1
1 TABLET, FILM COATED ORAL 2 TIMES DAILY
Qty: 14 TABLET | Refills: 0 | Status: SHIPPED | OUTPATIENT
Start: 2022-07-17 | End: 2022-07-24

## 2022-07-17 RX ORDER — KETOROLAC TROMETHAMINE 30 MG/ML
30 INJECTION, SOLUTION INTRAMUSCULAR; INTRAVENOUS
Status: COMPLETED | OUTPATIENT
Start: 2022-07-17 | End: 2022-07-17

## 2022-07-17 RX ORDER — BUTALBITAL, ACETAMINOPHEN AND CAFFEINE 50; 325; 40 MG/1; MG/1; MG/1
2 TABLET ORAL
Status: COMPLETED | OUTPATIENT
Start: 2022-07-17 | End: 2022-07-17

## 2022-07-17 RX ORDER — BUTALBITAL, ACETAMINOPHEN AND CAFFEINE 50; 325; 40 MG/1; MG/1; MG/1
1 TABLET ORAL
Qty: 20 TABLET | Refills: 0 | Status: SHIPPED | OUTPATIENT
Start: 2022-07-17

## 2022-07-17 RX ADMIN — BUTALBITAL, ACETAMINOPHEN, AND CAFFEINE 2 TABLET: 50; 325; 40 TABLET ORAL at 14:04

## 2022-07-17 RX ADMIN — KETOROLAC TROMETHAMINE 30 MG: 30 INJECTION, SOLUTION INTRAMUSCULAR at 14:04

## 2022-07-17 NOTE — ED PROVIDER NOTES
EMERGENCY DEPARTMENT HISTORY AND PHYSICAL EXAM      Date: 7/17/2022  Patient Name: Gaviota Kahn    History of Presenting Illness     Chief Complaint   Patient presents with    Flu Like Symptoms     cold symptoms a week, headache, cold with cough , nasal congestion. has had three Covid vaccinations. no known exposure. History Provided By: Patient    HPI: Gaviota Kahn, 46 y.o. female with PMHx significant for diabetes, presents to the ED with cc of cold symptoms for 1 week. She reports frontal headache, nasal congestion, productive cough, ear pain, and sore throat. Symptoms are gradually worsening. She has tried OTC medications without relief. She denies fevers, chest pain, shortness of breath, vomiting. There are no other complaints, changes, or physical findings at this time. PCP: Pamela Salinas NP    No current facility-administered medications on file prior to encounter. Current Outpatient Medications on File Prior to Encounter   Medication Sig Dispense Refill    liraglutide (VICTOZA) 0.6 mg/0.1 mL (18 mg/3 mL) pnij 1.2 mg by SubCUTAneous route.  BD Ultra-Fine Short Pen Needle 31 gauge x 5/16\" ndle       insulin lispro (HumaLOG KwikPen Insulin) 100 unit/mL kwikpen Inject 13 units with breakfast and 30 units with lunch      Trulicity 9.60 [place]/8.1 mL sub-q pen 0.75 mg by SubCUTAneous route every seven (7) days. (Patient not taking: Reported on 3/2/2022)      atorvastatin (LIPITOR) 20 mg tablet Take 20 mg by mouth daily.  canagliflozin (Invokana) 100 mg tablet Take 1 Tablet by mouth Daily (before breakfast). (Patient not taking: Reported on 3/2/2022) 30 Tablet 1    diclofenac (VOLTAREN) 1 % gel Apply 2 g to affected area four (4) times daily. (Patient not taking: Reported on 3/2/2022) 100 g 1    ibuprofen (MOTRIN) 600 mg tablet Take 1 Tab by mouth every six (6) hours as needed for Pain.  (Patient not taking: Reported on 11/10/2021) 20 Tab 0    lidocaine 4 % patch Apply patch to the area of pain for 12 hours, then remove for 12 hours. 30 Patch 0    insulin glargine (LANTUS SOLOSTAR U-100 INSULIN) 100 unit/mL (3 mL) inpn Inject 70 units at bedtime (Patient taking differently: Inject 50 units twice a day) 25 Pen 3    metFORMIN ER (GLUCOPHAGE XR) 500 mg tablet Take 2 Tabs by mouth Before breakfast and dinner. 360 Tab 3    glucose blood VI test strips (ONETOUCH VERIO) strip Use to test blood glucose 5x daily 100 Strip 11    Insulin Needles, Disposable, (YOUNG PEN NEEDLE) 32 gauge x \" ndle Use with insulin pens to inject up to 4 times daily 200 Pen Needle 7    lisinopril (PRINIVIL, ZESTRIL) 5 mg tablet Take 1 Tab by mouth daily. (Patient not taking: Reported on 2022) 90 Tab 3    Blood-Glucose Meter (ONETOUCH VERIO FLEX) misc Use to test blood sugar 8 times daily 1 Each 0       Past History     Past Medical History:  Past Medical History:   Diagnosis Date    [facility] [address]     Diabetes [address]     type 2    Diabetes mellitus ([facility]     insulin    Hyperlipidemia     Obesity        Past Surgical History:  Past Surgical History:   Procedure Laterality Date    HX  SECTION  2011    breech    HX GYN      C Section x 3    [place]  DELIVERY ONLY      2011       Family History:  Family History   Problem Relation Age of Onset    Diabetes Mother     Diabetes Father     Diabetes Sister     Diabetes Brother     Diabetes Sister     Diabetes Brother        Social History:  Social History     Tobacco Use    Smoking status: Never Smoker    Smokeless tobacco: Never Used   Vaping Use    Vaping Use: Never used   Substance Use Topics    Alcohol use: Never    Drug use: Never       Allergies: Allergies   Allergen Reactions    Exenatide Microspheres Nausea and Vomiting    Victoza [Liraglutide] Swelling         Review of Systems   Review of Systems   Constitutional: Positive for chills and fatigue. Negative for fever.    HENT: Positive for congestion, ear pain and sore throat. Eyes: Negative for redness and visual disturbance. Respiratory: Positive for cough. Negative for shortness of breath. Cardiovascular: Negative for chest pain and palpitations. Gastrointestinal: Negative for abdominal pain, nausea and vomiting. Genitourinary: Negative for dysuria and hematuria. Musculoskeletal: Negative for back pain and gait problem. Skin: Negative for rash and wound. Neurological: Positive for headaches. Negative for dizziness. Psychiatric/Behavioral: Negative for behavioral problems and confusion. All other systems reviewed and are negative. Physical Exam   Physical Exam  Constitutional:       Appearance: She is not toxic-appearing. HENT:      Head: Normocephalic and atraumatic. Ears:      Comments: Fluid noted behind the left TM with erythema. Right TM is clear. Nose:      Comments: Bilateral maxillary sinus tenderness. Mouth/Throat:      Mouth: Mucous membranes are moist.      Comments: Erythema of the posterior oropharynx. No tonsillar enlargement or exudates. Eyes:      Extraocular Movements: Extraocular movements intact. Pupils: Pupils are equal, round, and reactive to light. Cardiovascular:      Rate and Rhythm: Normal rate and regular rhythm. Pulmonary:      Effort: Pulmonary effort is normal. No respiratory distress. Breath sounds: Normal breath sounds. No wheezing. Musculoskeletal:         General: No deformity. Normal range of motion. Cervical back: Normal range of motion and neck supple. Skin:     General: Skin is warm and dry. Neurological:      General: No focal deficit present. Mental Status: She is alert and oriented to person, place, and time. Psychiatric:         Behavior: Behavior normal.           Diagnostic Study Results     Labs -   No results found for this or any previous visit (from the past 12 hour(s)).     Radiologic Studies -   No orders to display     CT Results  (Last 48 hours) None        CXR Results  (Last 48 hours)    None            Medical Decision Making   I am the first provider for this patient. I reviewed the vital signs, available nursing notes, past medical history, past surgical history, family history and social history. Vital Signs-Reviewed the patient's vital signs. Patient Vitals for the past 12 hrs:   Temp Pulse Resp BP SpO2   07/17/22 1331 98 °F (36.7 °C) 98 16 117/80 100 %         Records Reviewed: Nursing Notes and Old Medical Records      Provider Notes (Medical Decision Making):   DDx: viral URI, COVID-19, otitis media, sinusitis    Patient presents with URI symptoms. Pt is afebrile and clinically well appearing. Lungs are clear to auscultation and she is maintaining high oxygenation on room air. COVID-19 swab was sent and is pending. Plan to start on antibiotic for treatment of OM. Discussed symptomatic treatment, follow up, and return precautions. ED Course:   Initial assessment performed. The patients presenting problems have been discussed, and they are in agreement with the care plan formulated and outlined with them. I have encouraged them to ask questions as they arise throughout their visit. Disposition:  2:00 PM  The patient has been re-evaluated and is ready for discharge. Reviewed available results with patient. Counseled patient on diagnosis and care plan. Patient has expressed understanding, and all questions have been answered. Patient agrees with plan and agrees to follow up as recommended, or to return to the ED if their symptoms worsen. Discharge instructions have been provided and explained to the patient, along with reasons to return to the ED. PLAN:  1. Discharge Medication List as of 7/17/2022  2:00 PM      START taking these medications    Details   !! ibuprofen (MOTRIN) 800 mg tablet Take 1 Tablet by mouth every six (6) hours as needed for Pain for up to 7 days. , Normal, Disp-20 Tablet, R-0 amoxicillin-clavulanate (Augmentin) 875-125 mg per tablet Take 1 Tablet by mouth two (2) times a day for 7 days. , Normal, Disp-14 Tablet, R-0      butalbital-acetaminophen-caffeine (FIORICET, ESGIC) -40 mg per tablet Take 1 Tablet by mouth every four (4) hours as needed for Headache., Normal, Disp-20 Tablet, R-0       !! - Potential duplicate medications found. Please discuss with provider. CONTINUE these medications which have NOT CHANGED    Details   liraglutide (VICTOZA) 0.6 mg/0.1 mL (18 mg/3 mL) pnij 1.2 mg by SubCUTAneous route., Historical Med      BD Ultra-Fine Short Pen Needle 31 gauge x 5/16\" ndle Historical Med, ARMANDO      insulin lispro (HumaLOG KwikPen Insulin) 100 unit/mL kwikpen Inject 13 units with breakfast and 30 units with lunch, Historical Med      Trulicity 8.78 /4.5 mL sub-q pen 0.75 mg by SubCUTAneous route every seven (7) days. , Historical Med, ARMANDO      atorvastatin (LIPITOR) 20 mg tablet Take 20 mg by mouth daily. , Historical Med      canagliflozin (Invokana) 100 mg tablet Take 1 Tablet by mouth Daily (before breakfast). , Normal, Disp-30 Tablet, R-1      diclofenac (VOLTAREN) 1 % gel Apply 2 g to affected area four (4) times daily. , Normal, Disp-100 g, R-1      !! ibuprofen (MOTRIN) 600 mg tablet Take 1 Tab by mouth every six (6) hours as needed for Pain., Normal, Disp-20 Tab, R-0      lidocaine 4 % patch Apply patch to the area of pain for 12 hours, then remove for 12 hours. , Normal, Disp-30 Patch, R-0      insulin glargine (LANTUS SOLOSTAR U-100 INSULIN) 100 unit/mL (3 mL) inpn Inject 70 units at bedtime, PrintPlease consider 90 day supplies to promote better adherenceDisp-25 Pen, R-3      metFORMIN ER (GLUCOPHAGE XR) 500 mg tablet Take 2 Tabs by mouth Before breakfast and dinner., Print, Disp-360 Tab, R-3      glucose blood VI test strips (ONETOUCH VERIO) strip Use to test blood glucose 5x daily, Normal, Disp-100 Strip, R-11      Insulin Needles, Disposable, (YOUNG PEN NEEDLE) 32 gauge x 5/32\" ndle Use with insulin pens to inject up to 4 times daily, Normal, Disp-200 Pen Needle, R-7      lisinopril (PRINIVIL, ZESTRIL) 5 mg tablet Take 1 Tab by mouth daily. , Print, Disp-90 Tab, R-3      Blood-Glucose Meter (ONETOUCH VERIO FLEX) misc Use to test blood sugar 8 times daily, Normal, Disp-1 Each, R-0       !! - Potential duplicate medications found. Please discuss with provider. 2.   Follow-up Information     Follow up With Specialties Details Why Contact Info    [facility] NP Nurse Practitioner Schedule an appointment as soon as possible for a visit in 3 days  [address]      [address] DEPT Emergency Medicine Go to  If symptoms worsen [address] [place] [address]  [phone]        Return to ED if worse     Diagnosis     Clinical Impression:   1. Acute upper respiratory infection    2. Acute non-recurrent maxillary sinusitis    3. Non-recurrent acute serous otitis media of left ear            Aayush Haider.  MARIO Hollins

## 2022-07-18 LAB
SARS-COV-2, XPLCVT: NOT DETECTED
SOURCE, COVRS: NORMAL

## 2022-07-19 ENCOUNTER — OFFICE VISIT (OUTPATIENT)
Dept: SURGERY | Age: 42
End: 2022-07-19
Payer: MEDICAID

## 2022-07-19 VITALS
SYSTOLIC BLOOD PRESSURE: 115 MMHG | TEMPERATURE: 98.3 F | HEIGHT: 57 IN | WEIGHT: 179.8 LBS | OXYGEN SATURATION: 98 % | RESPIRATION RATE: 18 BRPM | DIASTOLIC BLOOD PRESSURE: 75 MMHG | HEART RATE: 92 BPM | BODY MASS INDEX: 38.79 KG/M2

## 2022-07-19 DIAGNOSIS — K21.9 GASTROESOPHAGEAL REFLUX DISEASE, UNSPECIFIED WHETHER ESOPHAGITIS PRESENT: ICD-10-CM

## 2022-07-19 DIAGNOSIS — E66.01 SEVERE OBESITY (BMI 35.0-35.9 WITH COMORBIDITY) (HCC): Primary | ICD-10-CM

## 2022-07-19 PROCEDURE — 99214 OFFICE O/P EST MOD 30 MIN: CPT | Performed by: SURGERY

## 2022-07-19 NOTE — LETTER
7/20/2022    Patient: Deneen Ochoa   YOB: 1980   Date of Visit: 7/19/2022     Humera Wilson NP  [address] [facility] [address]  Via Fax: [phone]    Dear Humera Wilson NP,      Thank you for referring Ms. Angelica Paz to [facility] [facility] for evaluation. My notes for this consultation are attached. If you have questions, please do not hesitate to call me. I look forward to following your patient along with you.       Sincerely,    Angelita De La Torre MD

## 2022-07-19 NOTE — PROGRESS NOTES
1. Have you been to the ER, urgent care clinic since your last visit? Hospitalized since your last visit? Yes    2. Have you seen or consulted any other health care providers outside of the [address] [place] [address] since your last visit? Include any pap smears or colon screening. No

## 2022-07-20 NOTE — PROGRESS NOTES
Bariatric Surgery Consult 2 of 3    Geetha Morris is a 46 y.o. female with a history of morbid obesity. Her Height: 4' 9\" (144.8 cm), Weight: 179 lb 12.8 oz (81.6 kg). Body mass index is 38.91 kg/m². She reports that she has been trying to lose weight for 5 years. Her maximum weight was 180 pounds. She has attended our bariatric surgery information seminar. Hillary Epstein wants to consider laparoscopic gastric bypass surgery. Pt is referred by: [facility] Pathak NP. Comorbidities:     Bariatric comorbidities present: hypertension, insulin dependent diabetes, GERD, and obstructive sleep apnea    Ambulatory status: independent    The patient's reported level of exercise: moderately active.       Patient Active Problem List    Diagnosis Date Noted    Uncontrolled diabetes mellitus 2017    Diabetes in pregnancy 2017    Pregnant 2017    Microalbuminuria 2016    Mixed hyperlipidemia 2015    BMI 34.0-34.9,adult 2015    Medically noncompliant 2015    Labor and delivery complicated by fetal stress 2014    H/O:  2014    DM (diabetes mellitus), gestational 2014    Diabetes mellitus complicating pregnancy, antepartum 2014    Obesity 2013    Hyperlipidemia 2013    Type II diabetes mellitus, uncontrolled 2013     Past Medical History:   Diagnosis Date    Diabetes ([address]     Diabetes ([address]     type 2    Diabetes mellitus ([address] 2011    insulin    Hyperlipidemia     Obesity       Past Surgical History:   Procedure Laterality Date    HX  SECTION  2011    breech    HX GYN      C Section x 3    [place]  DELIVERY ONLY      2011      Social History     Tobacco Use    Smoking status: Never    Smokeless tobacco: Never   Substance Use Topics    Alcohol use: Never      Family History   Problem Relation Age of Onset    Diabetes Mother     Diabetes Father     Diabetes Sister     Diabetes Brother     Diabetes Sister     Diabetes Brother       . Current Outpatient Medications   Medication Sig    ibuprofen (MOTRIN) 800 mg tablet Take 1 Tablet by mouth every six (6) hours as needed for Pain for up to 7 days. amoxicillin-clavulanate (Augmentin) 875-125 mg per tablet Take 1 Tablet by mouth two (2) times a day for 7 days. liraglutide (VICTOZA) 0.6 mg/0.1 mL (18 mg/3 mL) pnij 1.2 mg by SubCUTAneous route. BD Ultra-Fine Short Pen Needle 31 gauge x 5/16\" ndle     insulin lispro (HumaLOG KwikPen Insulin) 100 unit/mL kwikpen Inject 13 units with breakfast and 30 units with lunch    ibuprofen (MOTRIN) 600 mg tablet Take 1 Tab by mouth every six (6) hours as needed for Pain. insulin glargine (LANTUS SOLOSTAR U-100 INSULIN) 100 unit/mL (3 mL) inpn Inject 70 units at bedtime (Patient taking differently: Inject 50 units twice a day)    metFORMIN ER (GLUCOPHAGE XR) 500 mg tablet Take 2 Tabs by mouth Before breakfast and dinner. glucose blood VI test strips (ONETOUCH VERIO) strip Use to test blood glucose 5x daily    Insulin Needles, Disposable, (YOUNG PEN NEEDLE) 32 gauge x 5/32\" ndle Use with insulin pens to inject up to 4 times daily    Blood-Glucose Meter (ONETOUCH VERIO FLEX) misc Use to test blood sugar 8 times daily    butalbital-acetaminophen-caffeine (FIORICET, ESGIC) -40 mg per tablet Take 1 Tablet by mouth every four (4) hours as needed for Headache. (Patient not taking: Reported on 1/15/6015)    Trulicity 1.73 BA/7.6 mL sub-q pen 0.75 mg by SubCUTAneous route every seven (7) days. (Patient not taking: Reported on 3/2/2022)    atorvastatin (LIPITOR) 20 mg tablet Take 20 mg by mouth daily. (Patient not taking: Reported on 7/19/2022)    canagliflozin (Invokana) 100 mg tablet Take 1 Tablet by mouth Daily (before breakfast). (Patient not taking: Reported on 3/2/2022)    diclofenac (VOLTAREN) 1 % gel Apply 2 g to affected area four (4) times daily.  (Patient not taking: Reported on 3/2/2022)    lidocaine 4 % patch Apply patch to the area of pain for 12 hours, then remove for 12 hours. (Patient not taking: Reported on 7/19/2022)    lisinopril (PRINIVIL, ZESTRIL) 5 mg tablet Take 1 Tab by mouth daily. (Patient not taking: Reported on 6/21/2022)     No current facility-administered medications for this visit. Allergies   Allergen Reactions    Exenatide Microspheres Nausea and Vomiting    Victoza [Liraglutide] Swelling         Review of Systems:    Constitutional: negative  Eyes: negative  Ears, Nose, Mouth, Throat, and Face: negative  Respiratory: negative  Cardiovascular: negative  Gastrointestinal: negative  Genitourinary:negative  Integument/Breast: negative  Hematologic/Lymphatic: negative  Musculoskeletal:negative  Neurological: negative  Behavioral/Psychiatric: negative    Objective:     Visit Vitals  /75 (BP 1 Location: Left arm, BP Patient Position: Sitting, BP Cuff Size: Adult)   Pulse 92   Temp 98.3 °F (36.8 °C) (Oral)   Resp 18   Ht 4' 9\" (1.448 m)   Wt 179 lb 12.8 oz (81.6 kg)   SpO2 98%   BMI 38.91 kg/m²        Physical Exam:    General:  alert, no distress, morbidly obese   Eyes:  conjunctivae and sclerae normal, pupils equal, round, reactive to light, extraocular movements intact without nystagmus   Throat & Neck: no erythema or exudates noted and neck supple and symmetrical; no palpable masses   Lungs:   clear to auscultation bilaterally   Heart:  Regular rate and rhythm   Abdomen:   obese, soft, nontender, nondistended, no masses or organomegaly,    Extremities: no edema,  no gait disturbances   Skin: Normal.   Findings:  Esophagus:normal  Stomach: erosive gastritis in antrum, biopsies taken   Duodenum/jejunum: normal, random biopsies taken         Therapies:  none     Specimens: as above         EBL: None      Complications:   None; patient tolerated the procedure well. Impression:    -See post-procedure diagnoses.      Recommendations:  -Continue acid suppression with protonix for 6 months  -avoid NSAIDS  -awaiting US of abdomen  -f/u in 2 months     Signed By: Ronnell Robles MD      7/15/2021  6:02 PM       Assessment:     1. Morbid obesity (Body mass index is 38.91 kg/m².) with multiple comorbidities. The patient meets criteria established by the NIH for weight loss surgery candidates. Without weight reduction, co-morbidities will escalate as well as increase risk of early mortality. Our recommendation is the patient could be served with laparoscopic gastric bypass surgery. I explained to the patient differences between laparoscopic gastric bypass, laparoscopic adjustable gastric banding, and laparoscopic vertical sleeve gastrectomy with respect to expected weight loss, resolution of comorbidities and risks. Ms. Sheila Russell has attended one our informational meetings and has seen our educational materials. She has requested Dr. Britney Encarnacion to perform her procedure. I reviewed the role for this procedure as a tool to help her achieve her weight loss goals. I reminded her that effective weight loss comes from lifelong adherence to changes in dietary choices, eating habits and exercise. Recommendation: We will request approval for laparoscopic gastric bypass surgery. She still appears to be a good candidate for gastric bypass. She will follow-up with me in 1 month. Her weight is up a few pounds from her last visit but overall her weight is low. I would just want to make sure her weight is not continuing to climb. Follow-up in 1 month    Signed By: Regina Benavides MD     July 20, 2022       Greater than half of the time: 30 minutes was used in counciling the patient about bariatric surgery and the steps she needs to take to move forward with her surgery. Ms. Sheila Russell has a reminder for a \"due or due soon\" health maintenance. I have asked that she contact her primary care provider for follow-up on this health maintenance.

## 2022-07-27 ENCOUNTER — CLINICAL SUPPORT (OUTPATIENT)
Dept: SURGERY | Age: 42
End: 2022-07-27

## 2022-07-27 DIAGNOSIS — E66.01 MORBID OBESITY (HCC): Primary | ICD-10-CM

## 2022-07-27 NOTE — PROGRESS NOTES
[employer] [facility]  Supervised Weight Loss     Date:   2022    Patient's Name: Rayo Rust  : 1980    Insurance:  [employer]          Session: 5 of  3 (program requirement)  Surgery: Gastric bypass                  Surgeon: Dr. Aracely Mitchell     Height: 57 inches        Weight:  174  Lbs (RD scale). BMI: 37             Pounds Lost since last month: 5 lbs            Pounds Gained since last month: 0     Starting Weight: 182 lbs                   Previous Months Weight: 179 lbs  Overall Pounds Lost: 8 lbs   Overall Pounds Gained: 0     Other Pertinent Information: Today's appointment was completed in the office. Pt needs to complete 1 more visit with surgeon    Smoking Status:  None  Alcohol Intake: None    I have reviewed with pt the guidelines of the supervised wt loss program.  Pt understands the expectations of some wt loss during the program and that wt gain could delay the process. I have also explained that appointments need to be consecutive and missing an appointment may result in starting over. Pt has received this information in writing. Changes that patient has made since last month include:  having 3 meals a day; tried another protein shake from list [employer]). Eating Habits and Behaviors  A nutrition lesson specific to vitamins was provided. We discussed the various reasons for needing vitamins and different types and doses. General healthy eating guidelines were also discussed. Pts were instructed that their plate should be made up 1/2 plate coming from non-starchy vegetables, 1/4 coming from lean meat, and 1/4 of their plate coming from carbohydrates, including fruits, starches, or milk. We discussed measuring meals to 1/2 cup total per meal after surgery. Drinking only calorie-free, sugar-free and non-carbonated beverages.  We discussed the importance of drinking 64 ounces of fluid per day to prevent dehydration post-operatively. Patient's current diet habits include: Pt is eating 3 meals per day. Snack choices include none. Pt is eating refined carbohydrate foods (bread, pasta, rice, potatoes) occasionally. Pt is eating sweets/desserts occasionally. Pt is using healthy cooking methods. Pt is eating meals prepared outside of the home 1x week. Pt is drinking water, diet soda occasionally. Pt reports no emotional eating. Physical Activity/Exercise  We talked about the importance of increasing daily physical activity and beginning to develop an exercise regimen/routine. We talked about exercise as being an important part of long term weight loss after surgery. Comments:  During class, I discussed with patient the importance of getting into an exercise routine. Pt is currently walking 30 min daily for activity. Pt has been encouraged to continue with current exercise. Behavior Modification       We talked about how to eat more mindfully and identify emotional eating triggers. Tips and recommendations for how to make these changes were provided. Pt was encouraged to keep a food journal and record what they were taking in daily. Overall Assessment: Pt demonstrates appropriate lifestyle changes evidenced by reported changes and weight loss over the past 5 months. We reviewed vitamin recommendations today with option of choosing [employer], [facility] or [address] [place] [address] all of which provide a 1 per day bariatric multivitamin and taking 9805-5750 mg calcium citrate separate. Pt demonstrates understanding of nutrition guidelines for bariatric surgery. Appears to be an appropriate candidate at this time. Patient-Set Goals:   1. Nutrition - continue with 3 meals a day  2. Exercise - continue with current exercise  3.  Behavior -research bariatric vitamins online (handout provided)    Valerie Thao RD  7/27/2022

## 2022-08-23 ENCOUNTER — OFFICE VISIT (OUTPATIENT)
Dept: SURGERY | Age: 42
End: 2022-08-23
Payer: MEDICAID

## 2022-08-23 VITALS
OXYGEN SATURATION: 97 % | SYSTOLIC BLOOD PRESSURE: 111 MMHG | BODY MASS INDEX: 37.97 KG/M2 | TEMPERATURE: 98.1 F | HEIGHT: 57 IN | WEIGHT: 176 LBS | HEART RATE: 125 BPM | DIASTOLIC BLOOD PRESSURE: 77 MMHG | RESPIRATION RATE: 18 BRPM

## 2022-08-23 DIAGNOSIS — K21.9 GASTROESOPHAGEAL REFLUX DISEASE, UNSPECIFIED WHETHER ESOPHAGITIS PRESENT: ICD-10-CM

## 2022-08-23 DIAGNOSIS — E66.01 SEVERE OBESITY (BMI 35.0-35.9 WITH COMORBIDITY) (HCC): Primary | ICD-10-CM

## 2022-08-23 PROCEDURE — 99214 OFFICE O/P EST MOD 30 MIN: CPT | Performed by: SURGERY

## 2022-08-23 NOTE — PROGRESS NOTES
1. Have you been to the ER, urgent care clinic since your last visit? Hospitalized since your last visit? No    2. Have you seen or consulted any other health care providers outside of the [address] [place] [address] since your last visit? Include any pap smears or colon screening.  No

## 2022-08-23 NOTE — LETTER
8/23/2022    Patient: Burnice Holter   YOB: 1980   Date of Visit: 8/23/2022     [facility] [address]  Via Fax: [phone]    Dear Anel Mixon NP,      Thank you for referring Ms. Angelica Paz to [facility] [facility] for evaluation. My notes for this consultation are attached. If you have questions, please do not hesitate to call me. I look forward to following your patient along with you.       Sincerely,    Charanjit Pepper MD

## 2022-08-23 NOTE — PROGRESS NOTES
Bariatric Surgery Consult 3 of 3    [facility] 46 [facility] with a history of morbid obesity. Her Height: 4' 9\" (144.8 cm), Weight: 176 lb (79.8 kg). Body mass index is 38.09 kg/m². She reports that she has been trying to lose weight for 5 years. Her maximum weight was 200 pounds. She has attended our bariatric surgery information seminar. Konrad Simon wants to consider laparoscopic gastric bypass surgery. Pt is referred by: St Shannon Heard NP. Comorbidities:     Bariatric comorbidities present: insulin dependent diabetes, GERD, and obstructive sleep apnea    Ambulatory status: independent    The patient's reported level of exercise: moderately active. Patient Active Problem List    Diagnosis Date Noted    Uncontrolled diabetes mellitus 2017    Diabetes in pregnancy 2017    Pregnant 2017    Microalbuminuria 2016    Mixed hyperlipidemia 2015    BMI 34.0-34.9,adult 2015    Medically noncompliant 2015    Labor and delivery complicated by fetal stress 2014    H/O:  2014    DM (diabetes mellitus), gestational 2014    Diabetes mellitus complicating pregnancy, antepartum 2014    Obesity 2013    Hyperlipidemia 2013    Type II diabetes mellitus, uncontrolled 2013     Past Medical History:   Diagnosis Date    Diabetes ([address]     Diabetes ([address]     type 2    Diabetes mellitus ([address]     insulin    Hyperlipidemia     Obesity       Past Surgical History:   Procedure Laterality Date    HX  SECTION  2011    breech    HX GYN      C Section x 3    [place]IV[place]      2011      Social History     Tobacco Use    Smoking status: Never    Smokeless tobacco: Never   Substance Use Topics    Alcohol use: Never      Family History   Problem Relation Age of Onset    Diabetes Mother     Diabetes Father     Diabetes Sister     Diabetes Brother     Diabetes Sister     Diabetes Brother       .   Current Outpatient Medications   Medication Sig    liraglutide (VICTOZA) 0.6 mg/0.1 mL (18 mg/3 mL) pnij 1.2 mg by SubCUTAneous route. BD Ultra-Fine Short Pen Needle 31 gauge x 5/16\" ndle     insulin lispro (HUMALOG) 100 unit/mL kwikpen Inject 13 units with breakfast and 30 units with lunch    ibuprofen (MOTRIN) 600 mg tablet Take 1 Tab by mouth every six (6) hours as needed for Pain. insulin glargine (LANTUS SOLOSTAR U-100 INSULIN) 100 unit/mL (3 mL) inpn Inject 70 units at bedtime (Patient taking differently: Inject 50 units twice a day)    metFORMIN ER (GLUCOPHAGE XR) 500 mg tablet Take 2 Tabs by mouth Before breakfast and dinner. glucose blood VI test strips (ONETOUCH VERIO) strip Use to test blood glucose 5x daily    Insulin Needles, Disposable, (YOUNG PEN NEEDLE) 32 gauge x 5/32\" ndle Use with insulin pens to inject up to 4 times daily    Blood-Glucose Meter (ONETOUCH VERIO FLEX) misc Use to test blood sugar 8 times daily    butalbital-acetaminophen-caffeine (FIORICET, ESGIC) -40 mg per tablet Take 1 Tablet by mouth every four (4) hours as needed for Headache. (Patient not taking: No sig reported)    Trulicity 7.71 OW/2.7 mL sub-q pen 0.75 mg by SubCUTAneous route every seven (7) days. (Patient not taking: Reported on 3/2/2022)    atorvastatin (LIPITOR) 20 mg tablet Take 20 mg by mouth daily. (Patient not taking: Reported on 8/23/2022)    canagliflozin (Invokana) 100 mg tablet Take 1 Tablet by mouth Daily (before breakfast). (Patient not taking: No sig reported)    diclofenac (VOLTAREN) 1 % gel Apply 2 g to affected area four (4) times daily. (Patient not taking: No sig reported)    lidocaine 4 % patch Apply patch to the area of pain for 12 hours, then remove for 12 hours. (Patient not taking: No sig reported)    lisinopril (PRINIVIL, ZESTRIL) 5 mg tablet Take 1 Tab by mouth daily. (Patient not taking: Reported on 8/23/2022)     No current facility-administered medications for this visit.       Allergies Allergen Reactions    Exenatide Microspheres Nausea and Vomiting    Victoza [Liraglutide] Swelling         Review of Systems:    Constitutional: negative  Ears, Nose, Mouth, Throat, and Face: negative  Respiratory: negative  Cardiovascular: negative  Gastrointestinal: negative  Genitourinary:negative  Integument/Breast: negative  Hematologic/Lymphatic: negative  Musculoskeletal:negative  Neurological: negative  Behavioral/Psychiatric: negative  Endocrine: negative  Allergic/Immunologic: negative    Objective:     Visit Vitals  /77 (BP 1 Location: Right arm, BP Patient Position: Sitting, BP Cuff Size: Adult)   Pulse (!) 125 Comment: states she has alot going on upset   Temp 98.1 °F (36.7 °C) (Oral)   Resp 18   Ht 4' 9\" (1.448 m)   Wt 176 lb (79.8 kg)   SpO2 97%   BMI 38.09 kg/m²        Physical Exam:    General:  alert, no distress, morbidly obese   Eyes:  conjunctivae and sclerae normal, pupils equal, round, reactive to light, extraocular movements intact without nystagmus   Throat & Neck: no erythema or exudates noted and neck supple and symmetrical; no palpable masses   Lungs:   clear to auscultation bilaterally   Heart:  Regular rate and rhythm   Abdomen:   obese, soft, nontender, nondistended, no masses or organomegaly,    Extremities: no edema,  no gait disturbances   Skin: Normal.     EGD results-  Findings:  Esophagus:normal  Stomach: erosive gastritis in antrum, biopsies taken   Duodenum/jejunum: normal, random biopsies taken         Therapies:  none     Specimens: as above         EBL: None      Complications:   None; patient tolerated the procedure well. Impression:    -See post-procedure diagnoses. Recommendations:  -Continue acid suppression with protonix for 6 months  -avoid NSAIDS  -awaiting US of abdomen  -f/u in 2 months     Signed By: Abelino Connelly MD      7/15/2021  6:02 PM       Assessment:     1.  Morbid obesity (Body mass index is 38.09 kg/m².) with multiple comorbidities. The patient meets criteria established by the NIH for weight loss surgery candidates. Without weight reduction, co-morbidities will escalate as well as increase risk of early mortality. Our recommendation is the patient could be served with laparoscopic gastric bypass surgery. I explained to the patient differences between laparoscopic gastric bypass, laparoscopic adjustable gastric banding, and laparoscopic vertical sleeve gastrectomy with respect to expected weight loss, resolution of comorbidities and risks. Ms. Sonu Carreon has attended one our informational meetings and has seen our educational materials. She has requested Dr. Yamilka Neville to perform her procedure. I reviewed the role for this procedure as a tool to help her achieve her weight loss goals. I reminded her that effective weight loss comes from lifelong adherence to changes in dietary choices, eating habits and exercise. Recommendation: We will request approval for laparoscopic gastric bypass surgery. She has mostly completed her requirements for surgery. She has done 3 visits with me. She has kept her weight stable. Her EGD essentially is normal for surgery. The only thing she has not completed is her psychological evaluation. I gave her the information for this. Once she has this and return back to us we will clear her and then submit for surgical insurance approval.    Signed By: Stella Fields MD     August 23, 2022       Greater than half of the time: 30 minutes was used in counciling the patient about bariatric surgery and the steps she needs to take to move forward with her surgery. Ms. Sonu Carreon has a reminder for a \"due or due soon\" health maintenance. I have asked that she contact her primary care provider for follow-up on this health maintenance.

## 2022-08-23 NOTE — Clinical Note
Patient still needs to have her psychological evaluation.   She is otherwise completed everything else

## (undated) DEVICE — FORCEPS BX L240CM JAW DIA2.8MM L CAP W/ NDL MIC MESH TOOTH

## (undated) DEVICE — TUBING HYDR IRR --